# Patient Record
Sex: MALE | Race: WHITE | Employment: FULL TIME | URBAN - METROPOLITAN AREA
[De-identification: names, ages, dates, MRNs, and addresses within clinical notes are randomized per-mention and may not be internally consistent; named-entity substitution may affect disease eponyms.]

---

## 2017-01-20 ENCOUNTER — OFFICE VISIT (OUTPATIENT)
Dept: FAMILY MEDICINE CLINIC | Age: 52
End: 2017-01-20

## 2017-01-20 VITALS
SYSTOLIC BLOOD PRESSURE: 136 MMHG | WEIGHT: 179.8 LBS | HEIGHT: 72 IN | TEMPERATURE: 96.8 F | DIASTOLIC BLOOD PRESSURE: 88 MMHG | RESPIRATION RATE: 12 BRPM | HEART RATE: 52 BPM | BODY MASS INDEX: 24.35 KG/M2

## 2017-01-20 DIAGNOSIS — S29.011A CHEST WALL MUSCLE STRAIN, INITIAL ENCOUNTER: Primary | ICD-10-CM

## 2017-01-20 DIAGNOSIS — K40.90 INGUINAL HERNIA, RIGHT: ICD-10-CM

## 2017-01-20 PROCEDURE — G8420 CALC BMI NORM PARAMETERS: HCPCS | Performed by: FAMILY MEDICINE

## 2017-01-20 PROCEDURE — G8427 DOCREV CUR MEDS BY ELIG CLIN: HCPCS | Performed by: FAMILY MEDICINE

## 2017-01-20 PROCEDURE — G8484 FLU IMMUNIZE NO ADMIN: HCPCS | Performed by: FAMILY MEDICINE

## 2017-01-20 PROCEDURE — 99213 OFFICE O/P EST LOW 20 MIN: CPT | Performed by: FAMILY MEDICINE

## 2017-01-20 PROCEDURE — 1036F TOBACCO NON-USER: CPT | Performed by: FAMILY MEDICINE

## 2017-01-20 PROCEDURE — 3017F COLORECTAL CA SCREEN DOC REV: CPT | Performed by: FAMILY MEDICINE

## 2017-01-20 RX ORDER — DICLOFENAC SODIUM 75 MG/1
75 TABLET, DELAYED RELEASE ORAL 2 TIMES DAILY
Qty: 60 TABLET | Refills: 1 | Status: SHIPPED | OUTPATIENT
Start: 2017-01-20 | End: 2017-02-06 | Stop reason: ALTCHOICE

## 2017-01-20 ASSESSMENT — ENCOUNTER SYMPTOMS
DIARRHEA: 0
COUGH: 0
SHORTNESS OF BREATH: 0
ABDOMINAL PAIN: 0
NAUSEA: 0
EYES NEGATIVE: 1
CONSTIPATION: 0

## 2017-01-20 ASSESSMENT — PATIENT HEALTH QUESTIONNAIRE - PHQ9
SUM OF ALL RESPONSES TO PHQ9 QUESTIONS 1 & 2: 0
2. FEELING DOWN, DEPRESSED OR HOPELESS: 0
SUM OF ALL RESPONSES TO PHQ QUESTIONS 1-9: 0
1. LITTLE INTEREST OR PLEASURE IN DOING THINGS: 0

## 2017-02-06 ENCOUNTER — OFFICE VISIT (OUTPATIENT)
Dept: FAMILY MEDICINE CLINIC | Age: 52
End: 2017-02-06

## 2017-02-06 VITALS
HEART RATE: 68 BPM | SYSTOLIC BLOOD PRESSURE: 112 MMHG | RESPIRATION RATE: 16 BRPM | HEIGHT: 72 IN | BODY MASS INDEX: 27.39 KG/M2 | DIASTOLIC BLOOD PRESSURE: 66 MMHG | TEMPERATURE: 97.1 F | WEIGHT: 202.2 LBS

## 2017-02-06 DIAGNOSIS — Z12.5 SPECIAL SCREENING FOR MALIGNANT NEOPLASM OF PROSTATE: ICD-10-CM

## 2017-02-06 DIAGNOSIS — Z00.00 ROUTINE GENERAL MEDICAL EXAMINATION AT A HEALTH CARE FACILITY: ICD-10-CM

## 2017-02-06 DIAGNOSIS — K40.90 INGUINAL HERNIA, RIGHT: ICD-10-CM

## 2017-02-06 DIAGNOSIS — Z01.818 PRE-OP EXAMINATION: ICD-10-CM

## 2017-02-06 DIAGNOSIS — Z01.818 PRE-OP EXAMINATION: Primary | ICD-10-CM

## 2017-02-06 DIAGNOSIS — I48.0 PAROXYSMAL A-FIB (HCC): ICD-10-CM

## 2017-02-06 LAB
ALBUMIN SERPL-MCNC: 4.6 G/DL (ref 3.9–4.9)
ALP BLD-CCNC: 59 U/L (ref 35–104)
ALT SERPL-CCNC: 20 U/L (ref 0–41)
ANION GAP SERPL CALCULATED.3IONS-SCNC: 12 MEQ/L (ref 7–13)
APTT: 27.2 SEC (ref 21.6–35.4)
AST SERPL-CCNC: 17 U/L (ref 0–40)
BASOPHILS ABSOLUTE: 0.1 K/UL (ref 0–0.2)
BASOPHILS RELATIVE PERCENT: 0.9 %
BILIRUB SERPL-MCNC: 0.8 MG/DL (ref 0–1.2)
BUN BLDV-MCNC: 15 MG/DL (ref 6–20)
CALCIUM SERPL-MCNC: 9.5 MG/DL (ref 8.6–10.2)
CHLORIDE BLD-SCNC: 105 MEQ/L (ref 98–107)
CHOLESTEROL, TOTAL: 198 MG/DL (ref 0–199)
CO2: 24 MEQ/L (ref 22–29)
CREAT SERPL-MCNC: 0.81 MG/DL (ref 0.7–1.2)
EOSINOPHILS ABSOLUTE: 0.2 K/UL (ref 0–0.7)
EOSINOPHILS RELATIVE PERCENT: 2.7 %
GFR AFRICAN AMERICAN: >60
GFR NON-AFRICAN AMERICAN: >60
GLOBULIN: 3 G/DL (ref 2.3–3.5)
GLUCOSE BLD-MCNC: 99 MG/DL (ref 74–109)
HCT VFR BLD CALC: 41.3 % (ref 42–52)
HDLC SERPL-MCNC: 46 MG/DL (ref 40–59)
HEMOGLOBIN: 13.8 G/DL (ref 14–18)
INR BLD: 0.9
LDL CHOLESTEROL CALCULATED: 97 MG/DL (ref 0–129)
LYMPHOCYTES ABSOLUTE: 2 K/UL (ref 1–4.8)
LYMPHOCYTES RELATIVE PERCENT: 34.7 %
MCH RBC QN AUTO: 30.6 PG (ref 27–31.3)
MCHC RBC AUTO-ENTMCNC: 33.4 % (ref 33–37)
MCV RBC AUTO: 91.6 FL (ref 80–100)
MONOCYTES ABSOLUTE: 0.5 K/UL (ref 0.2–0.8)
MONOCYTES RELATIVE PERCENT: 9.5 %
NEUTROPHILS ABSOLUTE: 3 K/UL (ref 1.4–6.5)
NEUTROPHILS RELATIVE PERCENT: 52.2 %
PDW BLD-RTO: 13.4 % (ref 11.5–14.5)
PLATELET # BLD: 255 K/UL (ref 130–400)
POTASSIUM SERPL-SCNC: 4.2 MEQ/L (ref 3.5–5.1)
PROSTATE SPECIFIC ANTIGEN: 1.55 NG/ML (ref 0–3.89)
PROTHROMBIN TIME: 10 SEC (ref 8.1–13.7)
RBC # BLD: 4.5 M/UL (ref 4.7–6.1)
SODIUM BLD-SCNC: 141 MEQ/L (ref 132–144)
TOTAL PROTEIN: 7.6 G/DL (ref 6.4–8.1)
TRIGL SERPL-MCNC: 276 MG/DL (ref 0–200)
WBC # BLD: 5.7 K/UL (ref 4.8–10.8)

## 2017-02-06 PROCEDURE — G8427 DOCREV CUR MEDS BY ELIG CLIN: HCPCS | Performed by: FAMILY MEDICINE

## 2017-02-06 PROCEDURE — G8484 FLU IMMUNIZE NO ADMIN: HCPCS | Performed by: FAMILY MEDICINE

## 2017-02-06 PROCEDURE — 99214 OFFICE O/P EST MOD 30 MIN: CPT | Performed by: FAMILY MEDICINE

## 2017-02-06 PROCEDURE — 93000 ELECTROCARDIOGRAM COMPLETE: CPT | Performed by: FAMILY MEDICINE

## 2017-02-06 PROCEDURE — 1036F TOBACCO NON-USER: CPT | Performed by: FAMILY MEDICINE

## 2017-02-06 PROCEDURE — 3017F COLORECTAL CA SCREEN DOC REV: CPT | Performed by: FAMILY MEDICINE

## 2017-02-06 PROCEDURE — G8419 CALC BMI OUT NRM PARAM NOF/U: HCPCS | Performed by: FAMILY MEDICINE

## 2017-02-06 ASSESSMENT — ENCOUNTER SYMPTOMS
DIARRHEA: 0
CONSTIPATION: 0
COUGH: 0
NAUSEA: 0
ABDOMINAL PAIN: 0
EYES NEGATIVE: 1
SHORTNESS OF BREATH: 0

## 2017-02-07 ENCOUNTER — OFFICE VISIT (OUTPATIENT)
Dept: CARDIOLOGY | Age: 52
End: 2017-02-07

## 2017-02-07 VITALS
SYSTOLIC BLOOD PRESSURE: 130 MMHG | HEART RATE: 61 BPM | DIASTOLIC BLOOD PRESSURE: 80 MMHG | OXYGEN SATURATION: 99 % | HEIGHT: 72 IN | BODY MASS INDEX: 26.82 KG/M2 | WEIGHT: 198 LBS

## 2017-02-07 DIAGNOSIS — I48.19 PERSISTENT ATRIAL FIBRILLATION (HCC): Primary | ICD-10-CM

## 2017-02-07 PROCEDURE — G8484 FLU IMMUNIZE NO ADMIN: HCPCS | Performed by: INTERNAL MEDICINE

## 2017-02-07 PROCEDURE — G8419 CALC BMI OUT NRM PARAM NOF/U: HCPCS | Performed by: INTERNAL MEDICINE

## 2017-02-07 PROCEDURE — 99213 OFFICE O/P EST LOW 20 MIN: CPT | Performed by: INTERNAL MEDICINE

## 2017-02-07 PROCEDURE — G8427 DOCREV CUR MEDS BY ELIG CLIN: HCPCS | Performed by: INTERNAL MEDICINE

## 2017-02-07 PROCEDURE — 1036F TOBACCO NON-USER: CPT | Performed by: INTERNAL MEDICINE

## 2017-02-07 PROCEDURE — 3017F COLORECTAL CA SCREEN DOC REV: CPT | Performed by: INTERNAL MEDICINE

## 2017-02-07 ASSESSMENT — ENCOUNTER SYMPTOMS: SORE THROAT: 0

## 2017-08-08 ENCOUNTER — OFFICE VISIT (OUTPATIENT)
Dept: CARDIOLOGY | Age: 52
End: 2017-08-08

## 2017-08-08 VITALS
SYSTOLIC BLOOD PRESSURE: 120 MMHG | WEIGHT: 196 LBS | DIASTOLIC BLOOD PRESSURE: 80 MMHG | BODY MASS INDEX: 26.55 KG/M2 | HEIGHT: 72 IN | HEART RATE: 107 BPM

## 2017-08-08 DIAGNOSIS — I48.19 PERSISTENT ATRIAL FIBRILLATION (HCC): Primary | ICD-10-CM

## 2017-08-08 PROCEDURE — 99213 OFFICE O/P EST LOW 20 MIN: CPT | Performed by: INTERNAL MEDICINE

## 2017-08-08 PROCEDURE — 1036F TOBACCO NON-USER: CPT | Performed by: INTERNAL MEDICINE

## 2017-08-08 PROCEDURE — G8427 DOCREV CUR MEDS BY ELIG CLIN: HCPCS | Performed by: INTERNAL MEDICINE

## 2017-08-08 PROCEDURE — G8419 CALC BMI OUT NRM PARAM NOF/U: HCPCS | Performed by: INTERNAL MEDICINE

## 2017-08-08 PROCEDURE — 93000 ELECTROCARDIOGRAM COMPLETE: CPT | Performed by: INTERNAL MEDICINE

## 2017-08-08 PROCEDURE — 3017F COLORECTAL CA SCREEN DOC REV: CPT | Performed by: INTERNAL MEDICINE

## 2017-09-22 ENCOUNTER — HOSPITAL ENCOUNTER (OUTPATIENT)
Dept: CARDIAC CATH/INVASIVE PROCEDURES | Age: 52
Discharge: HOME | End: 2017-09-22
Attending: INTERNAL MEDICINE | Admitting: INTERNAL MEDICINE
Payer: COMMERCIAL

## 2017-09-22 VITALS — BODY MASS INDEX: 27.09 KG/M2 | RESPIRATION RATE: 18 BRPM | WEIGHT: 200 LBS | HEIGHT: 72 IN

## 2017-09-22 LAB
EKG ATRIAL RATE: 220 BPM
EKG Q-T INTERVAL: 404 MS
EKG QRS DURATION: 84 MS
EKG QTC CALCULATION (BAZETT): 445 MS
EKG R AXIS: -17 DEGREES
EKG T AXIS: -6 DEGREES
EKG VENTRICULAR RATE: 73 BPM

## 2017-09-22 PROCEDURE — 93005 ELECTROCARDIOGRAM TRACING: CPT

## 2017-09-22 RX ORDER — SODIUM CHLORIDE 9 MG/ML
INJECTION, SOLUTION INTRAVENOUS CONTINUOUS
Status: DISCONTINUED | OUTPATIENT
Start: 2017-09-22 | End: 2017-09-22

## 2017-10-05 ENCOUNTER — OFFICE VISIT (OUTPATIENT)
Dept: CARDIOLOGY | Age: 52
End: 2017-10-05

## 2017-10-05 VITALS
HEART RATE: 63 BPM | WEIGHT: 196 LBS | HEIGHT: 72 IN | SYSTOLIC BLOOD PRESSURE: 110 MMHG | DIASTOLIC BLOOD PRESSURE: 80 MMHG | BODY MASS INDEX: 26.55 KG/M2

## 2017-10-05 DIAGNOSIS — I48.19 PERSISTENT ATRIAL FIBRILLATION (HCC): Primary | ICD-10-CM

## 2017-10-05 PROCEDURE — G8417 CALC BMI ABV UP PARAM F/U: HCPCS | Performed by: INTERNAL MEDICINE

## 2017-10-05 PROCEDURE — 3017F COLORECTAL CA SCREEN DOC REV: CPT | Performed by: INTERNAL MEDICINE

## 2017-10-05 PROCEDURE — G8427 DOCREV CUR MEDS BY ELIG CLIN: HCPCS | Performed by: INTERNAL MEDICINE

## 2017-10-05 PROCEDURE — 1036F TOBACCO NON-USER: CPT | Performed by: INTERNAL MEDICINE

## 2017-10-05 PROCEDURE — G8484 FLU IMMUNIZE NO ADMIN: HCPCS | Performed by: INTERNAL MEDICINE

## 2017-10-05 PROCEDURE — 93000 ELECTROCARDIOGRAM COMPLETE: CPT | Performed by: INTERNAL MEDICINE

## 2017-10-05 PROCEDURE — 99213 OFFICE O/P EST LOW 20 MIN: CPT | Performed by: INTERNAL MEDICINE

## 2017-10-05 NOTE — PROGRESS NOTES
Chief Complaint   Patient presents with    Atrial Fibrillation     F/U TO DISCUSS HAVING TO CANCEL THE CARDIOVERSION DUE TO MEDICATION ISSUES.       4-7-16: Patient presents for initial medical evaluation. Patient is followed on a regular basis by Dr. Sajan Padron MD. Had been feeling SOB and IRR HB at times. Does have chest tightness and indigestion at times. Was noted to have new onset Afibb on routine EKG at PCP;s office. Does eat candy on a regular basis. He does snore at night per wife. Pt denies  nausea, vomiting, diarrhea, constipation, motor weakness, insomnia, weight loss, syncope, dizziness, lightheadedness, palpitations, PND, orthopnea. No hx of MI, CHF or LHC/Stress test. Does have family hx of heart disease. S/p ECHO with EF of 40%, difficult to estimate due to afibb, mild MR, trace AI. Lab work is ok. 5-19-16: s/p normal nuclear stress test with EF of 46%, likely due to poor gating due to afibb. No ischemia. S/p sleep study with mild PARVIN, mild O2 desaturation to 85% for 4 min. CPAP therapy was indicated if symptomatic. Pt denies chest pain,  change in exercise capacity, fatigue,  nausea, vomiting, diarrhea, constipation, motor weakness, insomnia, weight loss, syncope, dizziness, lightheadedness, palpitations, PND, orthopnea, or claudication. Does have SOB/Lh/dizz and fatigue with bending down and moving things around. 6-16-16: s/p RUSS guided CV with successful conversion to NSR. Was loaded with amiodarone. RUSS was ok, no shunts with bubble study. EKG today in office with HR of 47bpm, QTC of 436ms. Feeling better overall. Has more energy. No bleeding issues. Pt denies chest pain, dyspnea, dyspnea on exertion, change in exercise capacity, fatigue,  nausea, vomiting, diarrhea, constipation, motor weakness, insomnia, weight loss, syncope, dizziness, lightheadedness, palpitations, PND, orthopnea, or claudication. 9-8-16: as above, feeling good overall.  Pt denies chest pain, dyspnea, dyspnea on exertion, change in exercise capacity, fatigue,  nausea, vomiting, diarrhea, constipation, motor weakness, insomnia, weight loss, syncope, dizziness, lightheadedness, palpitations, PND, orthopnea, or claudication. States he's active, playing softball and not having any issues. 2-7-17: does have a couple of procedures coming up with hernia and leg vein stripping at [facility]. S/p 2 week event monitor with NSR, no evidence of atrial fibb. He did stop his meds of amio and coumadin in 1/2017. Pt denies chest pain, dyspnea, dyspnea on exertion, change in exercise capacity, fatigue,  nausea, vomiting, diarrhea, constipation, motor weakness, insomnia, weight loss, syncope, dizziness, lightheadedness, palpitations, PND, orthopnea.     8-8-17: feeling good overall. Pt denies chest pain, dyspnea, dyspnea on exertion, change in exercise capacity, fatigue,  nausea, vomiting, diarrhea, constipation, motor weakness, insomnia, weight loss, syncope, dizziness, lightheadedness, palpitations, PND, orthopnea, or claudication. No nitro use. BP and hr are good. CAD is stable. No LE discoloration or ulcers. No LE edema. No CHF type symptoms. Lipid profile is normal. CHADS2=0. Not on any [address]. EKG today shows Afibb at 107bpm.       10-5-17: was not taking his meds and therefore CV was cancelled. He is back on his meds now for 2 weeks. Pt denies chest pain, dyspnea, dyspnea on exertion, change in exercise capacity, fatigue,  nausea, vomiting, diarrhea, constipation, motor weakness, insomnia, weight loss, syncope, dizziness, lightheadedness, palpitations, PND, orthopnea, or claudication. No bleeding issues. No excessive caffeine/ETOH. BP and HR are good. afibb is stable.      Patient Active Problem List   Diagnosis    Persistent atrial fibrillation ([address]    Family history of coronary artery disease    Marijuana abuse       Past Surgical History:   Procedure Laterality Date    CARDIOVERSION  05/23/16    COLONOSCOPY  10/30/2014 kg/(m^2). Physical Examination:  General appearance - alert, well appearing, and in no distress  Mental status - alert, oriented to person, place, and time  Neck - Neck is supple, no JVD or carotid bruits. No thyromegaly or adenopathy. Chest - clear to auscultation, no wheezes, rales or rhonchi, symmetric air entry  Heart - normal rate, regular rhythm, normal S1, S2, no murmurs, rubs, clicks or gallops  Abdomen - soft, nontender, nondistended, no masses or organomegaly  Neurological - alert, oriented, normal speech, no focal findings or movement disorder noted  Extremities - peripheral pulses normal, no pedal edema, no clubbing or cyanosis  Skin - normal coloration and turgor, no rashes, no suspicious skin lesions noted      Orders Placed This Encounter   Procedures    EKG 12 Lead     EKG with Afibb at 63bpm.     ASSESSMENT:    1. Persistent atrial fibrillation (HCC) - CHADS2=0    2. Family history of coronary artery disease     3. Marijuana abuse     4. Precordial pain     5. LEE (dyspnea on exertion)           PLAN:     Patient will need to continue to follow up with you for their general medical care     As always, aggressive risk factor modification is strongly recommended. We should adhere to the [address] guidelines for HTN management and the NCEP ATP III guidelines for LDL-C management. Cardiac diet is always recommended with low fat, cholesterol, calories and sodium. Continue medications at current doses. Check EKG today    Consider Afibb ablation in future. Will plan for CV and sotalol loading in September per patient request.     Resume Xarelto and start lopressor 25mg BID. Patient is to avoid any excessive caffeine, chocolate, or OTC stimulants. Thank you for allowing me to participate in the care of your patient, please don't hesitate to contact me if you have any further questions.

## 2017-10-05 NOTE — MR AVS SNAPSHOT
After Visit Summary             Bridger Yates   10/5/2017 8:00 AM   Office Visit    Description:  Male : 1965   Provider:  Sai Pascal DO   Department:  [facility]              Your Follow-Up and Future Appointments         Below is a list of your follow-up and future appointments. This may not be a complete list as you may have made appointments directly with providers that we are not aware of or your providers may have made some for you. Please call your providers to confirm appointments. It is important to keep your appointments. Please bring your current insurance card, photo ID, co-pay, and all medication bottles to your appointment. If self-pay, payment is expected at the time of service. Information from Your Visit        Department     Name Address Phone Fax    [facility] [address] [place] [address] Mary Meade 69 [phone]      You Were Seen for:         Comments    Persistent atrial fibrillation [facility])   [[id number]]         Vital Signs     Blood Pressure Pulse Height Weight Body Mass Index Smoking Status    110/80 (Site: Right Arm, Position: Sitting, Cuff Size: Large Adult) 61 6' (1.829 m) 196 lb (88.9 kg) 26.58 kg/m2 Former Smoker      Additional Information about your Body Mass Index (BMI)           Your BMI as listed above is considered overweight (25.0-29.9). BMI is an estimate of body fat, calculated from your height and weight. The higher your BMI, the greater your risk of heart disease, high blood pressure, type 2 diabetes, stroke, gallstones, arthritis, sleep apnea, and certain cancers. BMI is not perfect. It may overestimate body fat in athletes and people who are more muscular. If your body fat is high you can improve your BMI by decreasing your calorie intake and becoming more physically active.      Learn more at: [email or web address]             Medications and Orders Your Current Medications Are              rivaroxaban (XARELTO) 20 MG TABS tablet Take 1 tablet by mouth daily (with breakfast)    metoprolol tartrate (LOPRESSOR) 25 MG tablet Take 1 tablet by mouth 2 times daily      Allergies           No Known Allergies      We Ordered/Performed the following           EKG 12 Lead          Result Summary for EKG 12 Lead      Result Information     Status          Final result (Resulted: 10/5/2017)           10/5/2017  8:24 AM      Scans on Order [id number]            ECG on 10/5/2017  8:24 AM : ECG Report                     Additional Information        Basic Information     Date Of Birth Sex Race Ethnicity Preferred Language    1965 Male White Non-/Non  English      Problem List as of 10/5/2017  Date Reviewed: 8/8/2017                Persistent atrial fibrillation ([address]    Family history of coronary artery disease    Marijuana abuse      Immunizations as of 10/5/2017     Name Date    Tdap (Boostrix, Adacel) 12/13/2016      Preventive Care        Date Due    Colonoscopy 10/29/2017    Yearly Flu Vaccine (1) 1/20/2018 (Originally 9/1/2017)    Hepatitis C screening is recommended for all adults regardless of risk factors born between [facility] at least once (lifetime) who have never been tested. 1/20/2018 (Originally 1965)    HIV screening is recommended for all people regardless of risk factors  aged 15-65 years at least once (lifetime) who have never been HIV tested. 1/20/2018 (Originally 8/4/1978)    Cholesterol Screening 2/6/2022    Tetanus Combination Vaccine (2 - Td) 12/13/2026            MyChart Signup           Our records indicate that you have declined MyChart signup.

## 2017-10-09 ENCOUNTER — OFFICE VISIT (OUTPATIENT)
Dept: FAMILY MEDICINE CLINIC | Age: 52
End: 2017-10-09

## 2017-10-09 VITALS
WEIGHT: 192 LBS | HEIGHT: 72 IN | SYSTOLIC BLOOD PRESSURE: 108 MMHG | HEART RATE: 93 BPM | BODY MASS INDEX: 26.01 KG/M2 | DIASTOLIC BLOOD PRESSURE: 62 MMHG | RESPIRATION RATE: 20 BRPM | TEMPERATURE: 97.2 F

## 2017-10-09 DIAGNOSIS — J01.90 ACUTE BACTERIAL SINUSITIS: ICD-10-CM

## 2017-10-09 DIAGNOSIS — R05.9 COUGH: Primary | ICD-10-CM

## 2017-10-09 DIAGNOSIS — B96.89 ACUTE BACTERIAL SINUSITIS: ICD-10-CM

## 2017-10-09 DIAGNOSIS — Z12.11 COLON CANCER SCREENING: ICD-10-CM

## 2017-10-09 PROCEDURE — G8484 FLU IMMUNIZE NO ADMIN: HCPCS | Performed by: FAMILY MEDICINE

## 2017-10-09 PROCEDURE — 3017F COLORECTAL CA SCREEN DOC REV: CPT | Performed by: FAMILY MEDICINE

## 2017-10-09 PROCEDURE — 1036F TOBACCO NON-USER: CPT | Performed by: FAMILY MEDICINE

## 2017-10-09 PROCEDURE — 99213 OFFICE O/P EST LOW 20 MIN: CPT | Performed by: FAMILY MEDICINE

## 2017-10-09 PROCEDURE — G8427 DOCREV CUR MEDS BY ELIG CLIN: HCPCS | Performed by: FAMILY MEDICINE

## 2017-10-09 PROCEDURE — G8417 CALC BMI ABV UP PARAM F/U: HCPCS | Performed by: FAMILY MEDICINE

## 2017-10-09 RX ORDER — METHYLPREDNISOLONE 4 MG/1
TABLET ORAL
Qty: 1 KIT | Refills: 0 | Status: ON HOLD | OUTPATIENT
Start: 2017-10-09 | End: 2017-11-03 | Stop reason: ALTCHOICE

## 2017-10-09 RX ORDER — AZITHROMYCIN 250 MG/1
TABLET, FILM COATED ORAL
Qty: 1 PACKET | Refills: 0 | Status: SHIPPED | OUTPATIENT
Start: 2017-10-09 | End: 2017-10-19

## 2017-10-09 ASSESSMENT — ENCOUNTER SYMPTOMS
COUGH: 1
EYE ITCHING: 0
CONSTIPATION: 0
ABDOMINAL PAIN: 0
SORE THROAT: 0
SINUS PRESSURE: 0
SHORTNESS OF BREATH: 0
DIARRHEA: 0
EYE DISCHARGE: 0

## 2017-10-09 NOTE — PROGRESS NOTES
Subjective  Daquan Tate, 45 y.o. male presents today with:  Chief Complaint   Patient presents with    Cough     productive clear cough, ST, Congestion x 6 wks. Unchanged since the onset. taking Mucinex- minimal relief           HPI    Patient with complaints of sinus pressure congestion and chronic cough occasionally productive. No other questions and or concerns for today's visit      Review of Systems   Constitutional: Negative for appetite change, fatigue and fever. HENT: Positive for congestion. Negative for ear pain, sinus pressure and sore throat. Eyes: Negative for discharge and itching. Respiratory: Positive for cough. Negative for shortness of breath. Cardiovascular: Negative for chest pain and palpitations. Gastrointestinal: Negative for abdominal pain, constipation and diarrhea. Endocrine: Negative for polydipsia. Genitourinary: Negative for difficulty urinating. Musculoskeletal: Negative for gait problem. Skin: Negative. Neurological: Negative for dizziness. Hematological: Negative. Psychiatric/Behavioral: Negative.           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    DR Daniela Gibbons    Persistent atrial fibrillation ([facility] [address] [address] 4/7/2016    Precordial pain 4/7/2016     Past Surgical History:   Procedure Laterality Date    CARDIOVERSION  05/23/16    COLONOSCOPY  10/30/2014    DR Daniela Gibbons - POLYP, SUBMUCOSAL MASS    HERNIA REPAIR  02/16/2017    DR Linnea Sotelo     Social History     Social History    Marital status:      Spouse name: Denise Courser     Number of children: 3    Years of education: N/A     Occupational History     [employer]     Social History Main Topics    Smoking status: Former Smoker Types: Cigars    Smokeless tobacco: Never Used    Alcohol use 0.0 oz/week    Drug use: No    Sexual activity: Not on file     Other Topics Concern    Not on file     Social History Narrative    No narrative on file     Family History   Problem Relation Age of Onset    Kidney Disease Father     Substance Abuse Father     Heart Disease Father     Diabetes Maternal Grandmother     Diabetes Maternal Grandfather     Diabetes Paternal Grandmother     Diabetes Paternal Grandfather      No Known Allergies  Current Outpatient Prescriptions   Medication Sig Dispense Refill    rivaroxaban (XARELTO) 20 MG TABS tablet Take 1 tablet by mouth daily (with breakfast) 30 tablet 6    metoprolol tartrate (LOPRESSOR) 25 MG tablet Take 1 tablet by mouth 2 times daily 60 tablet 6     No current facility-administered medications for this visit. PMH, Surgical Hx, Family Hx, and Social Hx reviewed and updated. Health Maintenance reviewed. Objective    Vitals:    10/09/17 1441   BP: 108/62   Pulse: 93   Resp: 20   Temp: 97.2 °F (36.2 °C)   TempSrc: Temporal   Weight: 192 lb (87.1 kg)   Height: 6' (1.829 m)       Physical Exam   Constitutional: He is oriented to person, place, and time. He appears well-developed and well-nourished. HENT:   Head: Normocephalic and atraumatic. Right Ear: External ear normal.   Left Ear: External ear normal.   Mouth/Throat: Oropharynx is clear and moist.   Eyes: Conjunctivae and EOM are normal. Pupils are equal, round, and reactive to light. Neck: Normal range of motion. Neck supple. No JVD present. No thyromegaly present. Cardiovascular: Normal rate, regular rhythm, normal heart sounds and intact distal pulses. Exam reveals no gallop and no friction rub. No murmur heard. Pulmonary/Chest: Effort normal and breath sounds normal. No respiratory distress. Abdominal: Bowel sounds are normal. He exhibits no mass. There is no tenderness. Musculoskeletal: Normal range of motion. Neurological: He is alert and oriented to person, place, and time. Skin: Skin is warm and dry. Psychiatric: He has a normal mood and affect. His behavior is normal.     He has  Sinus discomfort thanks inflamed adenopathy or exudate no drainage lungs are clear assessment sinusitis pharyngitis plan Z-Keena steroid pack and symptom for cough and follow-up not improved. Assessment & Plan   1. Cough     2. Colon cancer screening  Ambulatory referral to Gastroenterology   3. Acute bacterial sinusitis       Orders Placed This Encounter   Procedures    Ambulatory referral to Gastroenterology     Referral Priority:   Routine     Referral Type:   Consult for Advice and Opinion     Referral Reason:   Specialty Services Required     Requested Specialty:   Gastroenterology     Number of Visits Requested:   1     Orders Placed This Encounter   Medications    azithromycin (ZITHROMAX Z-KEENA) 250 MG tablet     Sig: Take 2 pills together on the first day. Take 1 pill a day for the remaining 4 days. Dispense:  1 packet     Refill:  0    methylPREDNISolone (MEDROL DOSEPACK) 4 MG tablet     Sig: Take by mouth. Dispense:  1 kit     Refill:  0    HYDROcodone-homatropine (HYCODAN) 5-1.5 MG/5ML syrup     Si - 2 TSP Q 6 HRS PRN. Dispense:  240 mL     Refill:  0     There are no discontinued medications. No Follow-up on file.         Controlled Substances Monitoring:          Daria Thapa MD            Increase fluids take medications as ordered and follow up if not feeling better

## 2017-10-30 ENCOUNTER — ANESTHESIA EVENT (OUTPATIENT)
Dept: ENDOSCOPY | Age: 52
End: 2017-10-30
Payer: COMMERCIAL

## 2017-10-30 ENCOUNTER — ANESTHESIA (OUTPATIENT)
Dept: ENDOSCOPY | Age: 52
End: 2017-10-30
Payer: COMMERCIAL

## 2017-10-30 ENCOUNTER — HOSPITAL ENCOUNTER (OUTPATIENT)
Age: 52
Setting detail: OUTPATIENT SURGERY
Discharge: HOME | End: 2017-10-30
Attending: SPECIALIST | Admitting: SPECIALIST
Payer: COMMERCIAL

## 2017-10-30 VITALS
SYSTOLIC BLOOD PRESSURE: 107 MMHG | HEART RATE: 69 BPM | RESPIRATION RATE: 16 BRPM | TEMPERATURE: 97.5 F | DIASTOLIC BLOOD PRESSURE: 71 MMHG | OXYGEN SATURATION: 97 %

## 2017-10-30 VITALS
SYSTOLIC BLOOD PRESSURE: 99 MMHG | DIASTOLIC BLOOD PRESSURE: 57 MMHG | RESPIRATION RATE: 17 BRPM | OXYGEN SATURATION: 98 %

## 2017-10-30 PROCEDURE — 2580000003 HC RX 258: Performed by: SPECIALIST

## 2017-10-30 PROCEDURE — 6360000002 HC RX W HCPCS: Performed by: NURSE ANESTHETIST, CERTIFIED REGISTERED

## 2017-10-30 PROCEDURE — 3700000001 HC ADD 15 MINUTES (ANESTHESIA): Performed by: SPECIALIST

## 2017-10-30 PROCEDURE — 3609027000 HC COLONOSCOPY: Performed by: SPECIALIST

## 2017-10-30 PROCEDURE — 88305 TISSUE EXAM BY PATHOLOGIST: CPT

## 2017-10-30 PROCEDURE — 3700000000 HC ANESTHESIA ATTENDED CARE: Performed by: SPECIALIST

## 2017-10-30 PROCEDURE — 7100000010 HC PHASE II RECOVERY - FIRST 15 MIN: Performed by: SPECIALIST

## 2017-10-30 PROCEDURE — 7100000011 HC PHASE II RECOVERY - ADDTL 15 MIN: Performed by: SPECIALIST

## 2017-10-30 RX ORDER — SODIUM CHLORIDE 0.9 % (FLUSH) 0.9 %
10 SYRINGE (ML) INJECTION PRN
Status: DISCONTINUED | OUTPATIENT
Start: 2017-10-30 | End: 2017-10-30 | Stop reason: HOSPADM

## 2017-10-30 RX ORDER — SODIUM CHLORIDE 0.9 % (FLUSH) 0.9 %
10 SYRINGE (ML) INJECTION EVERY 12 HOURS SCHEDULED
Status: DISCONTINUED | OUTPATIENT
Start: 2017-10-30 | End: 2017-10-30 | Stop reason: HOSPADM

## 2017-10-30 RX ORDER — LIDOCAINE HYDROCHLORIDE 10 MG/ML
1 INJECTION, SOLUTION EPIDURAL; INFILTRATION; INTRACAUDAL; PERINEURAL
Status: DISCONTINUED | OUTPATIENT
Start: 2017-10-30 | End: 2017-10-30 | Stop reason: HOSPADM

## 2017-10-30 RX ORDER — SODIUM CHLORIDE 9 MG/ML
INJECTION, SOLUTION INTRAVENOUS CONTINUOUS
Status: DISCONTINUED | OUTPATIENT
Start: 2017-10-30 | End: 2017-10-30 | Stop reason: HOSPADM

## 2017-10-30 RX ORDER — ONDANSETRON 2 MG/ML
4 INJECTION INTRAMUSCULAR; INTRAVENOUS
Status: DISCONTINUED | OUTPATIENT
Start: 2017-10-30 | End: 2017-10-30 | Stop reason: HOSPADM

## 2017-10-30 RX ORDER — PROPOFOL 10 MG/ML
INJECTION, EMULSION INTRAVENOUS CONTINUOUS PRN
Status: DISCONTINUED | OUTPATIENT
Start: 2017-10-30 | End: 2017-10-30 | Stop reason: SDUPTHER

## 2017-10-30 RX ADMIN — SODIUM CHLORIDE: 9 INJECTION, SOLUTION INTRAVENOUS at 08:21

## 2017-10-30 RX ADMIN — PROPOFOL 100 MCG/KG/MIN: 10 INJECTION, EMULSION INTRAVENOUS at 08:31

## 2017-10-30 ASSESSMENT — ENCOUNTER SYMPTOMS: SHORTNESS OF BREATH: 1

## 2017-10-30 NOTE — ANESTHESIA PRE PROCEDURE
Department of Anesthesiology  Preprocedure Note       Name:  Shanae Miranda   Age:  45 y.o.  :  1965                                          MRN:  [id number]         Date:  10/30/2017      Surgeon: Ava Orozco):  Steve Cobos MD    Procedure: Procedure(s):  COLONOSCOPY    Medications prior to admission:   Prior to Admission medications    Medication Sig Start Date End Date Taking? Authorizing Provider   methylPREDNISolone (MEDROL DOSEPACK) 4 MG tablet Take by mouth. 10/9/17  Yes Norma Kyle MD   HYDROcodone-homatropine (HYCODAN) 5-1.5 MG/5ML syrup 1 - 2 TSP Q 6 HRS PRN.  10/9/17  Yes Norma Kyle MD   rivaroxaban (XARELTO) 20 MG TABS tablet Take 1 tablet by mouth daily (with breakfast) 17   [facility]ab[facility]ela, DO   metoprolol tartrate (LOPRESSOR) 25 MG tablet Take 1 tablet by mouth 2 times daily 17   [facility] Holgabriela, DO       Current medications:    Current Facility-Administered Medications   Medication Dose Route Frequency Provider Last Rate Last Dose    0.9 % sodium chloride infusion   Intravenous Continuous Steve Cobos MD        sodium chloride flush 0.9 % injection 10 mL  10 mL Intravenous 2 times per day Steve Cobos MD        sodium chloride flush 0.9 % injection 10 mL  10 mL Intravenous PRN Steve Cobos MD        lidocaine PF 1 % injection 1 mL  1 mL Intradermal Once PRN Steve Cobos MD           Allergies:  No Known Allergies    Problem List:    Patient Active Problem List   Diagnosis Code    Persistent atrial fibrillation (HCC) I48.1    Family history of coronary artery disease Z82.49    Marijuana abuse F12.10       Past Medical History:        Diagnosis Date    Benign neoplasm of colon 10/29/2014    DR Alyson LEE (dyspnea on exertion) 2016    Family history of coronary artery disease 2016    Marijuana abuse 2016    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    DR Arlet Landin    Persistent atrial fibrillation ([facility] [address] 2016    Precordial pain 4/7/2016       Past Surgical History:        Procedure Laterality Date    CARDIOVERSION  05/23/16    COLONOSCOPY  10/30/2014    DR Benjamin Arthur - POLYP, SUBMUCOSAL MASS    HERNIA REPAIR  02/16/2017    DR Sang Jackson       Social History:    Social History   Substance Use Topics    Smoking status: Former Smoker     Types: Cigars    Smokeless tobacco: Never Used    Alcohol use 0.0 oz/week                                Counseling given: Not Answered      Vital Signs (Current): There were no vitals filed for this visit. BP Readings from Last 3 Encounters:   10/09/17 108/62   10/05/17 110/80   08/08/17 120/80       NPO Status:                                                                                 BMI:   Wt Readings from Last 3 Encounters:   10/09/17 192 lb (87.1 kg)   10/05/17 196 lb (88.9 kg)   09/22/17 200 lb (90.7 kg)     There is no height or weight on file to calculate BMI.    CBC:   Lab Results   Component Value Date    WBC 5.7 02/06/2017    RBC 4.50 02/06/2017    HGB 13.8 02/06/2017    HCT 41.3 02/06/2017    MCV 91.6 02/06/2017    RDW 13.4 02/06/2017     02/06/2017       CMP:   Lab Results   Component Value Date     02/06/2017    K 4.2 02/06/2017     02/06/2017    CO2 24 02/06/2017    BUN 15 02/06/2017    CREATININE 0.81 02/06/2017    GFRAA >60.0 02/06/2017    LABGLOM >60.0 02/06/2017    GLUCOSE 99 02/06/2017    PROT 7.6 02/06/2017    CALCIUM 9.5 02/06/2017    BILITOT 0.8 02/06/2017    ALKPHOS 59 02/06/2017    AST 17 02/06/2017    ALT 20 02/06/2017       POC Tests: No results for input(s): POCGLU, POCNA, POCK, POCCL, POCBUN, POCHEMO, POCHCT in the last 72 hours.     Coags:   Lab Results   Component Value Date    PROTIME 10.0 02/06/2017    INR 0.9 02/06/2017    APTT 27.2 02/06/2017       HCG (If Applicable): No results found for: PREGTESTUR, PREGSERUM, HCG, HCGQUANT     ABGs: No results found for: PHART, PO2ART, VSR3SUU, XQB9DTW,

## 2017-10-30 NOTE — ANESTHESIA POSTPROCEDURE EVALUATION
Department of Anesthesiology  Postprocedure Note    Patient: Vonda Mendoza  MRN: [id number]  YOB: 1965  Date of evaluation: 10/30/2017  Time:  8:49 AM     Procedure Summary     Date:  10/30/17 Room / Location:  [address] [place] [address] 01 / 59 [facility]    Anesthesia Start:  0831 Anesthesia Stop:      Procedure:  COLONOSCOPY (N/A ) Diagnosis:  ( - Screening, hx of polyps)    Surgeon:  Tadeo Schneider MD Responsible Provider:  Tiffany Hunter CRNA    Anesthesia Type:  MAC ASA Status:  3          Anesthesia Type: MAC    May Phase I:      May Phase II:      Last vitals: Reviewed and per EMR flowsheets.        Anesthesia Post Evaluation    Patient location during evaluation: bedside  Patient participation: complete - patient participated  Level of consciousness: awake and awake and alert  Airway patency: patent  Nausea & Vomiting: no nausea and no vomiting  Complications: no  Cardiovascular status: blood pressure returned to baseline and hemodynamically stable  Respiratory status: acceptable  Hydration status: euvolemic

## 2017-11-03 ENCOUNTER — TELEPHONE (OUTPATIENT)
Dept: CARDIOLOGY | Age: 52
End: 2017-11-03

## 2017-11-03 ENCOUNTER — HOSPITAL ENCOUNTER (INPATIENT)
Age: 52
LOS: 2 days | Discharge: HOME | DRG: 310 | End: 2017-11-05
Attending: INTERNAL MEDICINE | Admitting: INTERNAL MEDICINE
Payer: COMMERCIAL

## 2017-11-03 LAB
ANION GAP SERPL CALCULATED.3IONS-SCNC: 11 MEQ/L (ref 7–13)
BUN BLDV-MCNC: 15 MG/DL (ref 6–20)
CALCIUM SERPL-MCNC: 8.8 MG/DL (ref 8.6–10.2)
CHLORIDE BLD-SCNC: 108 MEQ/L (ref 98–107)
CO2: 25 MEQ/L (ref 22–29)
CREAT SERPL-MCNC: 0.61 MG/DL (ref 0.7–1.2)
GFR AFRICAN AMERICAN: >60
GFR NON-AFRICAN AMERICAN: >60
GLUCOSE BLD-MCNC: 95 MG/DL (ref 74–109)
HCT VFR BLD CALC: 38.1 % (ref 42–52)
HEMOGLOBIN: 12.9 G/DL (ref 14–18)
LV EF: 30 %
LVEF MODALITY: NORMAL
MAGNESIUM: 2.1 MG/DL (ref 1.7–2.3)
MCH RBC QN AUTO: 30.7 PG (ref 27–31.3)
MCHC RBC AUTO-ENTMCNC: 33.8 % (ref 33–37)
MCV RBC AUTO: 91 FL (ref 80–100)
PDW BLD-RTO: 13.6 % (ref 11.5–14.5)
PLATELET # BLD: 201 K/UL (ref 130–400)
POTASSIUM SERPL-SCNC: 4 MEQ/L (ref 3.5–5.1)
RBC # BLD: 4.18 M/UL (ref 4.7–6.1)
SODIUM BLD-SCNC: 144 MEQ/L (ref 132–144)
TSH SERPL DL<=0.05 MIU/L-ACNC: 1.37 UIU/ML (ref 0.27–4.2)
WBC # BLD: 6.9 K/UL (ref 4.8–10.8)

## 2017-11-03 PROCEDURE — 6370000000 HC RX 637 (ALT 250 FOR IP): Performed by: NURSE PRACTITIONER

## 2017-11-03 PROCEDURE — 36415 COLL VENOUS BLD VENIPUNCTURE: CPT

## 2017-11-03 PROCEDURE — 93306 TTE W/DOPPLER COMPLETE: CPT

## 2017-11-03 PROCEDURE — 2060000000 HC ICU INTERMEDIATE R&B

## 2017-11-03 PROCEDURE — 93005 ELECTROCARDIOGRAM TRACING: CPT

## 2017-11-03 PROCEDURE — 84443 ASSAY THYROID STIM HORMONE: CPT

## 2017-11-03 PROCEDURE — 99233 SBSQ HOSP IP/OBS HIGH 50: CPT | Performed by: INTERNAL MEDICINE

## 2017-11-03 PROCEDURE — 83735 ASSAY OF MAGNESIUM: CPT

## 2017-11-03 PROCEDURE — 85027 COMPLETE CBC AUTOMATED: CPT

## 2017-11-03 PROCEDURE — 80048 BASIC METABOLIC PNL TOTAL CA: CPT

## 2017-11-03 RX ORDER — SOTALOL HYDROCHLORIDE 80 MG/1
80 TABLET ORAL 2 TIMES DAILY
Status: DISCONTINUED | OUTPATIENT
Start: 2017-11-03 | End: 2017-11-05 | Stop reason: HOSPADM

## 2017-11-03 RX ADMIN — SOTALOL HYDROCHLORIDE 80 MG: 80 TABLET ORAL at 20:16

## 2017-11-03 RX ADMIN — SOTALOL HYDROCHLORIDE 80 MG: 80 TABLET ORAL at 12:29

## 2017-11-03 ASSESSMENT — ENCOUNTER SYMPTOMS
CHEST TIGHTNESS: 0
GASTROINTESTINAL NEGATIVE: 1
EYES NEGATIVE: 1
ALLERGIC/IMMUNOLOGIC NEGATIVE: 1
WHEEZING: 0
CHOKING: 0
STRIDOR: 0
APNEA: 0
SHORTNESS OF BREATH: 0
COUGH: 0

## 2017-11-03 ASSESSMENT — PAIN SCALES - GENERAL
PAINLEVEL_OUTOF10: 0

## 2017-11-03 NOTE — H&P
file     Social History Narrative    No narrative on file       Family Hx:  Family History   Problem Relation Age of Onset    Kidney Disease Father     Substance Abuse Father     Heart Disease Father     Diabetes Maternal Grandmother     Diabetes Maternal Grandfather     Diabetes Paternal Grandmother     Diabetes Paternal Grandfather     Colon Cancer Paternal Uncle        Review of Systems:   Review of Systems   Constitutional: Positive for fatigue. Negative for appetite change, chills, diaphoresis and fever. HENT: Negative. Eyes: Negative. Respiratory: Negative for apnea, cough, choking, chest tightness, shortness of breath, wheezing and stridor. Cardiovascular: Positive for palpitations. Negative for chest pain and leg swelling. Gastrointestinal: Negative. Endocrine: Negative. Genitourinary: Negative. Musculoskeletal: Negative. Allergic/Immunologic: Negative. Neurological: Negative. Hematological: Negative. Psychiatric/Behavioral: Negative. Physical Examination:    There were no vitals taken for this visit. Physical Exam   Constitutional: He is oriented to person, place, and time. He appears well-developed and well-nourished. HENT:   Head: Normocephalic. Eyes: Pupils are equal, round, and reactive to light. Neck: No JVD present. No tracheal deviation present. No thyromegaly present. Cardiovascular: Intact distal pulses. An irregular rhythm present. Exam reveals no gallop and no friction rub. No murmur heard. Pulmonary/Chest: Effort normal and breath sounds normal. No respiratory distress. He has no wheezes. He has no rales. He exhibits no tenderness. Abdominal: Soft. Bowel sounds are normal.   Musculoskeletal: Normal range of motion. He exhibits no edema or tenderness. Lymphadenopathy:     He has no cervical adenopathy. Neurological: He is alert and oriented to person, place, and time. Skin: Skin is warm and dry.    Psychiatric: He has a

## 2017-11-04 LAB
ANION GAP SERPL CALCULATED.3IONS-SCNC: 11 MEQ/L (ref 7–13)
BUN BLDV-MCNC: 15 MG/DL (ref 6–20)
CALCIUM SERPL-MCNC: 8.6 MG/DL (ref 8.6–10.2)
CHLORIDE BLD-SCNC: 111 MEQ/L (ref 98–107)
CO2: 22 MEQ/L (ref 22–29)
CREAT SERPL-MCNC: 0.77 MG/DL (ref 0.7–1.2)
EKG ATRIAL RATE: 119 BPM
EKG ATRIAL RATE: 214 BPM
EKG ATRIAL RATE: 220 BPM
EKG ATRIAL RATE: 227 BPM
EKG ATRIAL RATE: 234 BPM
EKG ATRIAL RATE: 234 BPM
EKG Q-T INTERVAL: 364 MS
EKG Q-T INTERVAL: 378 MS
EKG Q-T INTERVAL: 404 MS
EKG Q-T INTERVAL: 406 MS
EKG Q-T INTERVAL: 418 MS
EKG Q-T INTERVAL: 424 MS
EKG QRS DURATION: 82 MS
EKG QRS DURATION: 82 MS
EKG QRS DURATION: 84 MS
EKG QRS DURATION: 86 MS
EKG QRS DURATION: 86 MS
EKG QRS DURATION: 92 MS
EKG QTC CALCULATION (BAZETT): 399 MS
EKG QTC CALCULATION (BAZETT): 406 MS
EKG QTC CALCULATION (BAZETT): 424 MS
EKG QTC CALCULATION (BAZETT): 432 MS
EKG QTC CALCULATION (BAZETT): 433 MS
EKG QTC CALCULATION (BAZETT): 433 MS
EKG R AXIS: -18 DEGREES
EKG R AXIS: -19 DEGREES
EKG R AXIS: -19 DEGREES
EKG R AXIS: -21 DEGREES
EKG R AXIS: -24 DEGREES
EKG R AXIS: -27 DEGREES
EKG T AXIS: -13 DEGREES
EKG T AXIS: -16 DEGREES
EKG T AXIS: -20 DEGREES
EKG T AXIS: -32 DEGREES
EKG T AXIS: -36 DEGREES
EKG T AXIS: -9 DEGREES
EKG VENTRICULAR RATE: 60 BPM
EKG VENTRICULAR RATE: 62 BPM
EKG VENTRICULAR RATE: 63 BPM
EKG VENTRICULAR RATE: 67 BPM
EKG VENTRICULAR RATE: 69 BPM
EKG VENTRICULAR RATE: 85 BPM
GFR AFRICAN AMERICAN: >60
GFR NON-AFRICAN AMERICAN: >60
GLUCOSE BLD-MCNC: 90 MG/DL (ref 74–109)
HCT VFR BLD CALC: 39.6 % (ref 42–52)
HEMOGLOBIN: 13.4 G/DL (ref 14–18)
MAGNESIUM: 2.1 MG/DL (ref 1.7–2.3)
MCH RBC QN AUTO: 30.7 PG (ref 27–31.3)
MCHC RBC AUTO-ENTMCNC: 34 % (ref 33–37)
MCV RBC AUTO: 90.4 FL (ref 80–100)
PDW BLD-RTO: 13.3 % (ref 11.5–14.5)
PLATELET # BLD: 202 K/UL (ref 130–400)
POTASSIUM SERPL-SCNC: 4.3 MEQ/L (ref 3.5–5.1)
RBC # BLD: 4.38 M/UL (ref 4.7–6.1)
SODIUM BLD-SCNC: 144 MEQ/L (ref 132–144)
WBC # BLD: 6.6 K/UL (ref 4.8–10.8)

## 2017-11-04 PROCEDURE — 93005 ELECTROCARDIOGRAM TRACING: CPT

## 2017-11-04 PROCEDURE — 36415 COLL VENOUS BLD VENIPUNCTURE: CPT

## 2017-11-04 PROCEDURE — 80048 BASIC METABOLIC PNL TOTAL CA: CPT

## 2017-11-04 PROCEDURE — 99231 SBSQ HOSP IP/OBS SF/LOW 25: CPT | Performed by: NURSE PRACTITIONER

## 2017-11-04 PROCEDURE — 2060000000 HC ICU INTERMEDIATE R&B

## 2017-11-04 PROCEDURE — 6370000000 HC RX 637 (ALT 250 FOR IP): Performed by: NURSE PRACTITIONER

## 2017-11-04 PROCEDURE — 83735 ASSAY OF MAGNESIUM: CPT

## 2017-11-04 PROCEDURE — 85027 COMPLETE CBC AUTOMATED: CPT

## 2017-11-04 RX ADMIN — SOTALOL HYDROCHLORIDE 80 MG: 80 TABLET ORAL at 09:11

## 2017-11-04 RX ADMIN — SOTALOL HYDROCHLORIDE 80 MG: 80 TABLET ORAL at 20:38

## 2017-11-04 ASSESSMENT — PAIN SCALES - GENERAL
PAINLEVEL_OUTOF10: 0

## 2017-11-04 ASSESSMENT — ENCOUNTER SYMPTOMS
COUGH: 0
WHEEZING: 0
APNEA: 0
GASTROINTESTINAL NEGATIVE: 1
ALLERGIC/IMMUNOLOGIC NEGATIVE: 1
STRIDOR: 0
EYES NEGATIVE: 1
SHORTNESS OF BREATH: 0
CHOKING: 0
CHEST TIGHTNESS: 0

## 2017-11-04 NOTE — CARE COORDINATION
PATIENT FROM HOME WITH SPOUSE AND PLANS TO RETURN HOME WITH SPOUSE WITH NO NEEDS. C3 TO FOLLOW FOR ANY CHANGES TO THE D/C PLAN.

## 2017-11-04 NOTE — PROGRESS NOTES
11/04/2017    BUN 15 11/04/2017    LABALBU 4.6 02/06/2017    CREATININE 0.77 11/04/2017    CALCIUM 8.6 11/04/2017    GFRAA >60.0 11/04/2017    LABGLOM >60.0 11/04/2017    GLUCOSE 90 11/04/2017     Magnesium:    Lab Results   Component Value Date    MG 2.1 11/04/2017     Troponin:  No results found for: TROPONINI    EKG:  A-fib qtc 423    Assessment:    Active Hospital Problems    Diagnosis Date Noted    Persistent atrial fibrillation ([address] [I48.1] 04/07/2016     Priority: Low      echo   Conclusions   Summary   Normal mitral valve structure and function.   Mild (1+) mitral regurgitation is present.   Normal aortic valve structure and function.   Normal tricuspid valve structure and function.   Normal pulmonic valve structure and function.   Moderately dilated left atrium.   Left ventricular ejection fraction is visually estimated at 30%.   Left ventricular size is mildly increased.   Normal right atrium.   Normal right ventricle structure and function.   No evidence of pericardial effusion.   No evidence of pleural effusion.   Signature     Plan:  1. Tele monitoring  2. Daily ekg's  3. Continue sotalol  4. 4/18/16 stress test normal  5.  Recheck echo after back in sinus  Electronically signed by Brisa Martínez NP on 11/4/2017 at 8:49 AM

## 2017-11-05 VITALS
WEIGHT: 194 LBS | TEMPERATURE: 97.9 F | DIASTOLIC BLOOD PRESSURE: 82 MMHG | SYSTOLIC BLOOD PRESSURE: 121 MMHG | BODY MASS INDEX: 26.31 KG/M2 | HEART RATE: 73 BPM | OXYGEN SATURATION: 98 % | RESPIRATION RATE: 18 BRPM

## 2017-11-05 LAB
ANION GAP SERPL CALCULATED.3IONS-SCNC: 14 MEQ/L (ref 7–13)
BUN BLDV-MCNC: 13 MG/DL (ref 6–20)
CALCIUM SERPL-MCNC: 8.9 MG/DL (ref 8.6–10.2)
CHLORIDE BLD-SCNC: 106 MEQ/L (ref 98–107)
CO2: 22 MEQ/L (ref 22–29)
CREAT SERPL-MCNC: 0.76 MG/DL (ref 0.7–1.2)
GFR AFRICAN AMERICAN: >60
GFR NON-AFRICAN AMERICAN: >60
GLUCOSE BLD-MCNC: 94 MG/DL (ref 74–109)
HCT VFR BLD CALC: 42.5 % (ref 42–52)
HEMOGLOBIN: 14.3 G/DL (ref 14–18)
MAGNESIUM: 2.1 MG/DL (ref 1.7–2.3)
MCH RBC QN AUTO: 30.2 PG (ref 27–31.3)
MCHC RBC AUTO-ENTMCNC: 33.7 % (ref 33–37)
MCV RBC AUTO: 89.5 FL (ref 80–100)
PDW BLD-RTO: 13.2 % (ref 11.5–14.5)
PLATELET # BLD: 222 K/UL (ref 130–400)
POTASSIUM SERPL-SCNC: 4 MEQ/L (ref 3.5–5.1)
RBC # BLD: 4.75 M/UL (ref 4.7–6.1)
SODIUM BLD-SCNC: 142 MEQ/L (ref 132–144)
WBC # BLD: 6.6 K/UL (ref 4.8–10.8)

## 2017-11-05 PROCEDURE — 83735 ASSAY OF MAGNESIUM: CPT

## 2017-11-05 PROCEDURE — 85027 COMPLETE CBC AUTOMATED: CPT

## 2017-11-05 PROCEDURE — 80048 BASIC METABOLIC PNL TOTAL CA: CPT

## 2017-11-05 PROCEDURE — 93005 ELECTROCARDIOGRAM TRACING: CPT

## 2017-11-05 PROCEDURE — 6370000000 HC RX 637 (ALT 250 FOR IP): Performed by: NURSE PRACTITIONER

## 2017-11-05 PROCEDURE — 36415 COLL VENOUS BLD VENIPUNCTURE: CPT

## 2017-11-05 PROCEDURE — 6360000002 HC RX W HCPCS: Performed by: INTERNAL MEDICINE

## 2017-11-05 PROCEDURE — 90656 IIV3 VACC NO PRSV 0.5 ML IM: CPT | Performed by: INTERNAL MEDICINE

## 2017-11-05 PROCEDURE — G0008 ADMIN INFLUENZA VIRUS VAC: HCPCS | Performed by: INTERNAL MEDICINE

## 2017-11-05 PROCEDURE — 99238 HOSP IP/OBS DSCHRG MGMT 30/<: CPT | Performed by: NURSE PRACTITIONER

## 2017-11-05 RX ORDER — SOTALOL HYDROCHLORIDE 80 MG/1
80 TABLET ORAL 2 TIMES DAILY
Qty: 60 TABLET | Refills: 5 | Status: SHIPPED | OUTPATIENT
Start: 2017-11-05

## 2017-11-05 RX ADMIN — SOTALOL HYDROCHLORIDE 80 MG: 80 TABLET ORAL at 08:06

## 2017-11-05 RX ADMIN — A/SINGAPORE/GP1908/2015 IVR-180 (AN A/MICHIGAN/45/2015 (H1N1)PDM09-LIKE VIRUS, A/HONG KONG/4801/2014, NYMC X-263B (H3N2) (AN A/HONG KONG/4801/2014-LIKE VIRUS), AND B/BRISBANE/60/2008, WILD TYPE (A B/BRISBANE/60/2008-LIKE VIRUS) 0.5 ML: 15; 15; 15 INJECTION, SUSPENSION INTRAMUSCULAR at 09:25

## 2017-11-05 ASSESSMENT — PAIN SCALES - GENERAL
PAINLEVEL_OUTOF10: 0

## 2017-11-05 NOTE — DISCHARGE SUMMARY
Cardiology Discharge Summary      Patient Identification:  Ca Christensen  : 1965  MRN: [id number]   Account: [de-identified]     Admit date: 11/3/2017  Discharge date: 17  Attending provider: Suzanne Palmer DO        Primary care provider: Arvin Michael MD     Admission Diagnoses:  Persistent atrial fibrillation [facility])         Discharge Diagnoses: Active Hospital Problems    Diagnosis Date Noted    Persistent atrial fibrillation [facility]) [I48.1] 2016     Priority: [facility] Course:   Ca Christensen is a 45 y.o. male admitted to [facility] on 11/3/2017 for A. fib and drug load. Patient was started on sotalol 80 mg by mouth twice a day QTC remained within normal limits and heart rate is controlled. We did do an echo EF of 30-40% with plan to repeat after the patient has an outpatient cardioversion      Procedures:        Consults:   none    Examination:  /82   Pulse 87   Temp 97.9 °F (36.6 °C) (Oral)   Resp 18   Wt 194 lb 0.1 oz (88 kg)   SpO2 98%   BMI 26.31 kg/m²    Physical Exam   Constitutional: He is oriented to person, place, and time. He appears well-developed and well-nourished. HENT:   Head: Normocephalic. Eyes: Pupils are equal, round, and reactive to light. Neck: No JVD present. No tracheal deviation present. No thyromegaly present. Cardiovascular: Normal heart sounds and intact distal pulses. An irregular rhythm present. Exam reveals no gallop and no friction rub. No murmur heard. Pulmonary/Chest: Effort normal and breath sounds normal. No respiratory distress. He has no wheezes. He has no rales. He exhibits no tenderness. Abdominal: Soft. Bowel sounds are normal.   Musculoskeletal: Normal range of motion. He exhibits no edema or tenderness. Lymphadenopathy:     He has no cervical adenopathy. Neurological: He is alert and oriented to person, place, and time.    Skin: Skin is 11.5 - 14.5 %    Platelets 444 153 - 395 K/uL   Magnesium    Collection Time: 11/05/17  6:10 AM   Result Value Ref Range    Magnesium 2.1 1.7 - 2.3 mg/dL           Follow-up visits:   Breanna Santiago MD  [address] [facility] [address]  [phone]          [address] [facility] [address]  [phone]    In 1 week         Assessment:  Active Hospital Problems    Diagnosis Date Noted    Persistent atrial fibrillation ([address] [I48.1] 04/07/2016     Priority: Low         Plan:   1. Okay to discharge home and we plan for an outpatient cardioversion after patient's in sinus rhythm we will repeat the echo  2. Medications as prescribed  3.   Sotalol 80 mg by mouth twice a day        Electronically signed by Maddi Paul NP on 11/5/2017 at 8:38 AM

## 2017-11-06 PROCEDURE — 93010 ELECTROCARDIOGRAM REPORT: CPT | Performed by: INTERNAL MEDICINE

## 2017-11-07 PROCEDURE — 93010 ELECTROCARDIOGRAM REPORT: CPT | Performed by: INTERNAL MEDICINE

## 2017-11-30 ENCOUNTER — OFFICE VISIT (OUTPATIENT)
Dept: CARDIOLOGY | Age: 52
End: 2017-11-30

## 2017-11-30 VITALS
WEIGHT: 194.6 LBS | DIASTOLIC BLOOD PRESSURE: 84 MMHG | HEART RATE: 66 BPM | BODY MASS INDEX: 26.39 KG/M2 | SYSTOLIC BLOOD PRESSURE: 126 MMHG

## 2017-11-30 DIAGNOSIS — I48.19 PERSISTENT ATRIAL FIBRILLATION (HCC): Primary | ICD-10-CM

## 2017-11-30 PROCEDURE — G8427 DOCREV CUR MEDS BY ELIG CLIN: HCPCS | Performed by: INTERNAL MEDICINE

## 2017-11-30 PROCEDURE — 1111F DSCHRG MED/CURRENT MED MERGE: CPT | Performed by: INTERNAL MEDICINE

## 2017-11-30 PROCEDURE — G8417 CALC BMI ABV UP PARAM F/U: HCPCS | Performed by: INTERNAL MEDICINE

## 2017-11-30 PROCEDURE — 99213 OFFICE O/P EST LOW 20 MIN: CPT | Performed by: INTERNAL MEDICINE

## 2017-11-30 PROCEDURE — G8484 FLU IMMUNIZE NO ADMIN: HCPCS | Performed by: INTERNAL MEDICINE

## 2017-11-30 PROCEDURE — 3017F COLORECTAL CA SCREEN DOC REV: CPT | Performed by: INTERNAL MEDICINE

## 2017-11-30 PROCEDURE — 93000 ELECTROCARDIOGRAM COMPLETE: CPT | Performed by: INTERNAL MEDICINE

## 2017-11-30 PROCEDURE — 1036F TOBACCO NON-USER: CPT | Performed by: INTERNAL MEDICINE

## 2017-11-30 NOTE — PROGRESS NOTES
constipation, motor weakness, insomnia, weight loss, syncope, dizziness, lightheadedness, palpitations, PND, orthopnea, or claudication. States he's active, playing softball and not having any issues. 2-7-17: does have a couple of procedures coming up with hernia and leg vein stripping at [facility]. S/p 2 week event monitor with NSR, no evidence of atrial fibb. He did stop his meds of amio and coumadin in 1/2017. Pt denies chest pain, dyspnea, dyspnea on exertion, change in exercise capacity, fatigue,  nausea, vomiting, diarrhea, constipation, motor weakness, insomnia, weight loss, syncope, dizziness, lightheadedness, palpitations, PND, orthopnea.     8-8-17: feeling good overall. Pt denies chest pain, dyspnea, dyspnea on exertion, change in exercise capacity, fatigue,  nausea, vomiting, diarrhea, constipation, motor weakness, insomnia, weight loss, syncope, dizziness, lightheadedness, palpitations, PND, orthopnea, or claudication. No nitro use. BP and hr are good. CAD is stable. No LE discoloration or ulcers. No LE edema. No CHF type symptoms. Lipid profile is normal. CHADS2=0. Not on any [address]. EKG today shows Afibb at 107bpm.       10-5-17: was not taking his meds and therefore CV was cancelled. He is back on his meds now for 2 weeks. Pt denies chest pain, dyspnea, dyspnea on exertion, change in exercise capacity, fatigue,  nausea, vomiting, diarrhea, constipation, motor weakness, insomnia, weight loss, syncope, dizziness, lightheadedness, palpitations, PND, orthopnea, or claudication. No bleeding issues. No excessive caffeine/ETOH. BP and HR are good. afibb is stable. 11-30-17: EKG today with Afibb. Compliant with meds, on sotalol and OAC. No bleeding issues. Does feel tired and fatigued at times, feels like he hit a wall at end of day. Stats sometimes people at work will tell him he doesn't look right.  Pt denies chest pain, dyspnea, dyspnea on exertion,   nausea, vomiting, diarrhea, constipation, motor weakness, insomnia, weight loss, syncope, dizziness, lightheadedness, palpitations, PND, orthopnea. Patient Active Problem List   Diagnosis    Persistent atrial fibrillation ([address]    Family history of coronary artery disease    Marijuana abuse       Past Surgical History:   Procedure Laterality Date    CARDIOVERSION  05/23/16    COLONOSCOPY  10/30/2014    DR Forrest Bird - POLYP, SUBMUCOSAL MASS    HERNIA REPAIR  02/16/2017    DR Chico Foster    [place] COLON [place] SCRN NOT  [address] IND N/A 10/30/2017    COLONOSCOPY performed by Stefan Overton MD at [address] [facility] Marital status:      Spouse name: Mecca Wiley     Number of children: 3    Years of education: N/A     Occupational History     [employer]     Social History Main Topics    Smoking status: Former Smoker     Types: Cigars    Smokeless tobacco: Never Used    Alcohol use 3.6 oz/week     6 Cans of beer per week      Comment: per week    Drug use: No    Sexual activity: Not on file     Other Topics Concern    Not on file     Social History Narrative    No narrative on file       Family History   Problem Relation Age of Onset    Kidney Disease Father     Substance Abuse Father     Heart Disease Father     Diabetes Maternal Grandmother     Diabetes Maternal Grandfather     Diabetes Paternal Grandmother     Diabetes Paternal Grandfather     Colon Cancer Paternal Uncle        Current Outpatient Prescriptions   Medication Sig Dispense Refill    sotalol (BETAPACE) 80 MG tablet Take 1 tablet by mouth 2 times daily 60 tablet 5    rivaroxaban (XARELTO) 20 MG TABS tablet Take 1 tablet by mouth daily (with breakfast) 30 tablet 6     No current facility-administered medications for this visit. Review of patient's allergies indicates no known allergies.     Review of Systems:  General ROS: negative  Psychological ROS: negative  Hematological and Lymphatic ROS: No history of blood clots or bleeding disorder. Respiratory ROS: no cough, shortness of breath, or wheezing  Cardiovascular ROS: positive for - irregular heartbeat  Gastrointestinal ROS: no abdominal pain, change in bowel habits, or black or bloody stools  Genito-Urinary ROS: no dysuria, trouble voiding, or hematuria  Musculoskeletal ROS: negative  Neurological ROS: no TIA or stroke symptoms  Dermatological ROS: negative    VITALS:  Blood pressure 126/84, pulse 66, weight 194 lb 9.6 oz (88.3 kg). Body mass index is 26.39 kg/m². Physical Examination:  General appearance - alert, well appearing, and in no distress  Mental status - alert, oriented to person, place, and time  Neck - Neck is supple, no JVD or carotid bruits. No thyromegaly or adenopathy. Chest - clear to auscultation, no wheezes, rales or rhonchi, symmetric air entry  Heart - normal rate, regular rhythm, normal S1, S2, no murmurs, rubs, clicks or gallops  Abdomen - soft, nontender, nondistended, no masses or organomegaly  Neurological - alert, oriented, normal speech, no focal findings or movement disorder noted  Extremities - peripheral pulses normal, no pedal edema, no clubbing or cyanosis  Skin - normal coloration and turgor, no rashes, no suspicious skin lesions noted      Orders Placed This Encounter   Procedures    EKG 12 Lead     EKG with Afibb at 63bpm.     ASSESSMENT:    1. Persistent atrial fibrillation (HCC) - CHADS2=0    2. Family history of coronary artery disease     3. Marijuana abuse     4. Precordial pain     5. LEE (dyspnea on exertion)           PLAN:     Patient will need to continue to follow up with you for their general medical care     As always, aggressive risk factor modification is strongly recommended. We should adhere to the [address] guidelines for HTN management and the NCEP ATP III guidelines for LDL-C management. Cardiac diet is always recommended with low fat, cholesterol, calories and sodium.     Continue medications at current

## 2017-12-08 ENCOUNTER — HOSPITAL ENCOUNTER (OUTPATIENT)
Dept: CARDIAC CATH/INVASIVE PROCEDURES | Age: 52
Discharge: HOME | End: 2017-12-08
Attending: INTERNAL MEDICINE | Admitting: INTERNAL MEDICINE
Payer: COMMERCIAL

## 2017-12-08 ENCOUNTER — ANESTHESIA EVENT (OUTPATIENT)
Dept: CARDIAC CATH/INVASIVE PROCEDURES | Age: 52
End: 2017-12-08
Payer: COMMERCIAL

## 2017-12-08 ENCOUNTER — ANESTHESIA (OUTPATIENT)
Dept: CARDIAC CATH/INVASIVE PROCEDURES | Age: 52
End: 2017-12-08
Payer: COMMERCIAL

## 2017-12-08 VITALS — OXYGEN SATURATION: 98 % | DIASTOLIC BLOOD PRESSURE: 74 MMHG | SYSTOLIC BLOOD PRESSURE: 123 MMHG

## 2017-12-08 VITALS
RESPIRATION RATE: 20 BRPM | HEART RATE: 75 BPM | HEIGHT: 72 IN | BODY MASS INDEX: 26.41 KG/M2 | WEIGHT: 195 LBS | TEMPERATURE: 97.4 F | SYSTOLIC BLOOD PRESSURE: 137 MMHG

## 2017-12-08 LAB
ANION GAP SERPL CALCULATED.3IONS-SCNC: 12 MEQ/L (ref 7–13)
BUN BLDV-MCNC: 15 MG/DL (ref 6–20)
CALCIUM SERPL-MCNC: 9 MG/DL (ref 8.6–10.2)
CHLORIDE BLD-SCNC: 105 MEQ/L (ref 98–107)
CO2: 25 MEQ/L (ref 22–29)
CREAT SERPL-MCNC: 0.78 MG/DL (ref 0.7–1.2)
EKG ATRIAL RATE: 56 BPM
EKG ATRIAL RATE: 85 BPM
EKG P AXIS: 60 DEGREES
EKG P-R INTERVAL: 186 MS
EKG Q-T INTERVAL: 424 MS
EKG Q-T INTERVAL: 468 MS
EKG QRS DURATION: 90 MS
EKG QRS DURATION: 90 MS
EKG QTC CALCULATION (BAZETT): 451 MS
EKG QTC CALCULATION (BAZETT): 454 MS
EKG R AXIS: -16 DEGREES
EKG R AXIS: -8 DEGREES
EKG T AXIS: -3 DEGREES
EKG T AXIS: 31 DEGREES
EKG VENTRICULAR RATE: 56 BPM
EKG VENTRICULAR RATE: 69 BPM
GFR AFRICAN AMERICAN: >60
GFR NON-AFRICAN AMERICAN: >60
GLUCOSE BLD-MCNC: 87 MG/DL (ref 74–109)
HCT VFR BLD CALC: 40.5 % (ref 42–52)
HEMOGLOBIN: 13.7 G/DL (ref 14–18)
INR BLD: 1.1
MCH RBC QN AUTO: 31.5 PG (ref 27–31.3)
MCHC RBC AUTO-ENTMCNC: 33.8 % (ref 33–37)
MCV RBC AUTO: 93.4 FL (ref 80–100)
PDW BLD-RTO: 13.5 % (ref 11.5–14.5)
PLATELET # BLD: 217 K/UL (ref 130–400)
POTASSIUM SERPL-SCNC: 4.1 MEQ/L (ref 3.5–5.1)
PROTHROMBIN TIME: 11.1 SEC (ref 8.1–13.7)
RBC # BLD: 4.33 M/UL (ref 4.7–6.1)
SODIUM BLD-SCNC: 142 MEQ/L (ref 132–144)
WBC # BLD: 6.4 K/UL (ref 4.8–10.8)

## 2017-12-08 PROCEDURE — 92960 CARDIOVERSION ELECTRIC EXT: CPT | Performed by: INTERNAL MEDICINE

## 2017-12-08 PROCEDURE — 80048 BASIC METABOLIC PNL TOTAL CA: CPT

## 2017-12-08 PROCEDURE — 85027 COMPLETE CBC AUTOMATED: CPT

## 2017-12-08 PROCEDURE — 2500000003 HC RX 250 WO HCPCS: Performed by: NURSE ANESTHETIST, CERTIFIED REGISTERED

## 2017-12-08 PROCEDURE — 93005 ELECTROCARDIOGRAM TRACING: CPT

## 2017-12-08 PROCEDURE — 6360000002 HC RX W HCPCS: Performed by: NURSE ANESTHETIST, CERTIFIED REGISTERED

## 2017-12-08 PROCEDURE — 85610 PROTHROMBIN TIME: CPT

## 2017-12-08 PROCEDURE — 3700000000 HC ANESTHESIA ATTENDED CARE

## 2017-12-08 RX ORDER — DIPHENHYDRAMINE HYDROCHLORIDE 50 MG/ML
12.5 INJECTION INTRAMUSCULAR; INTRAVENOUS
Status: DISCONTINUED | OUTPATIENT
Start: 2017-12-08 | End: 2017-12-08 | Stop reason: HOSPADM

## 2017-12-08 RX ORDER — HYDROCODONE BITARTRATE AND ACETAMINOPHEN 5; 325 MG/1; MG/1
2 TABLET ORAL PRN
Status: DISCONTINUED | OUTPATIENT
Start: 2017-12-08 | End: 2017-12-08 | Stop reason: HOSPADM

## 2017-12-08 RX ORDER — MEPERIDINE HYDROCHLORIDE 25 MG/ML
12.5 INJECTION INTRAMUSCULAR; INTRAVENOUS; SUBCUTANEOUS EVERY 5 MIN PRN
Status: DISCONTINUED | OUTPATIENT
Start: 2017-12-08 | End: 2017-12-08 | Stop reason: HOSPADM

## 2017-12-08 RX ORDER — TRIAMCINOLONE ACETONIDE 1 MG/G
CREAM TOPICAL EVERY 8 HOURS PRN
Status: DISCONTINUED | OUTPATIENT
Start: 2017-12-08 | End: 2017-12-08 | Stop reason: HOSPADM

## 2017-12-08 RX ORDER — DEXTROSE AND SODIUM CHLORIDE 5; .45 G/100ML; G/100ML
INJECTION, SOLUTION INTRAVENOUS CONTINUOUS
Status: DISCONTINUED | OUTPATIENT
Start: 2017-12-08 | End: 2017-12-08 | Stop reason: HOSPADM

## 2017-12-08 RX ORDER — LIDOCAINE HYDROCHLORIDE 20 MG/ML
INJECTION, SOLUTION INFILTRATION; PERINEURAL PRN
Status: DISCONTINUED | OUTPATIENT
Start: 2017-12-08 | End: 2017-12-08 | Stop reason: SDUPTHER

## 2017-12-08 RX ORDER — ONDANSETRON 2 MG/ML
4 INJECTION INTRAMUSCULAR; INTRAVENOUS
Status: DISCONTINUED | OUTPATIENT
Start: 2017-12-08 | End: 2017-12-08 | Stop reason: HOSPADM

## 2017-12-08 RX ORDER — FENTANYL CITRATE 50 UG/ML
50 INJECTION, SOLUTION INTRAMUSCULAR; INTRAVENOUS EVERY 10 MIN PRN
Status: DISCONTINUED | OUTPATIENT
Start: 2017-12-08 | End: 2017-12-08 | Stop reason: HOSPADM

## 2017-12-08 RX ORDER — SODIUM CHLORIDE 0.9 % (FLUSH) 0.9 %
10 SYRINGE (ML) INJECTION EVERY 12 HOURS SCHEDULED
Status: DISCONTINUED | OUTPATIENT
Start: 2017-12-08 | End: 2017-12-08 | Stop reason: HOSPADM

## 2017-12-08 RX ORDER — HYDROCODONE BITARTRATE AND ACETAMINOPHEN 5; 325 MG/1; MG/1
1 TABLET ORAL PRN
Status: DISCONTINUED | OUTPATIENT
Start: 2017-12-08 | End: 2017-12-08 | Stop reason: HOSPADM

## 2017-12-08 RX ORDER — METOCLOPRAMIDE HYDROCHLORIDE 5 MG/ML
10 INJECTION INTRAMUSCULAR; INTRAVENOUS
Status: DISCONTINUED | OUTPATIENT
Start: 2017-12-08 | End: 2017-12-08 | Stop reason: HOSPADM

## 2017-12-08 RX ORDER — SODIUM CHLORIDE 0.9 % (FLUSH) 0.9 %
10 SYRINGE (ML) INJECTION PRN
Status: DISCONTINUED | OUTPATIENT
Start: 2017-12-08 | End: 2017-12-08 | Stop reason: HOSPADM

## 2017-12-08 RX ORDER — PROPOFOL 10 MG/ML
200 INJECTION, EMULSION INTRAVENOUS ONCE
Status: DISCONTINUED | OUTPATIENT
Start: 2017-12-08 | End: 2017-12-08 | Stop reason: HOSPADM

## 2017-12-08 RX ORDER — PROPOFOL 10 MG/ML
INJECTION, EMULSION INTRAVENOUS PRN
Status: DISCONTINUED | OUTPATIENT
Start: 2017-12-08 | End: 2017-12-08 | Stop reason: SDUPTHER

## 2017-12-08 RX ADMIN — LIDOCAINE HYDROCHLORIDE 40 MG: 20 INJECTION, SOLUTION INFILTRATION; PERINEURAL at 15:47

## 2017-12-08 RX ADMIN — PROPOFOL 60 MG: 10 INJECTION, EMULSION INTRAVENOUS at 15:47

## 2017-12-08 ASSESSMENT — ENCOUNTER SYMPTOMS: SHORTNESS OF BREATH: 1

## 2017-12-08 NOTE — ANESTHESIA POSTPROCEDURE EVALUATION
Department of Anesthesiology  Postprocedure Note    Patient: Tuyet Razo  MRN: [id number]  YOB: 1965  Date of evaluation: 12/8/2017  Time:  3:54 PM     Procedure Summary     Date:  12/08/17 Room / Location:  Cardiac Cath Services    Anesthesia Start:  1544 Anesthesia Stop:  4581    Procedure:  CATH LAB WITH ANESTHESIA Diagnosis:      Scheduled Providers:   Responsible Provider:  Brea Zayas MD    Anesthesia Type:  MAC ASA Status:  3          Anesthesia Type: MAC    May Phase I:      May Phase II:      Last vitals: Reviewed and per EMR flowsheets.        Anesthesia Post Evaluation    Patient location during evaluation: bedside  Patient participation: complete - patient participated  Level of consciousness: awake and awake and alert  Pain score: 0  Airway patency: patent  Nausea & Vomiting: no nausea and no vomiting  Complications: no  Cardiovascular status: blood pressure returned to baseline and hemodynamically stable  Respiratory status: acceptable  Hydration status: euvolemic

## 2017-12-08 NOTE — ANESTHESIA PRE PROCEDURE
05/23/16    COLONOSCOPY  10/30/2014    DR Parth Rabago - POLYP, SUBMUCOSAL MASS    HERNIA REPAIR  02/16/2017    DR Isaías Ramos    [place] COLON CA SCRN NOT  [address] [place] [address] N/A 10/30/2017    COLONOSCOPY performed by Lala Sotelo MD at [address] [facility] History:    Social History   Substance Use Topics    Smoking status: Former Smoker     Types: Cigars    Smokeless tobacco: Never Used    Alcohol use 3.6 oz/week     6 Cans of beer per week      Comment: per week                                Counseling given: Not Answered      Vital Signs (Current):   Vitals:    12/08/17 1311   BP: (!) 137/0   Pulse: 75   Resp: 20   Temp: 97.4 °F (36.3 °C)   TempSrc: Temporal   Weight: 195 lb (88.5 kg)   Height: 6' (1.829 m)                                              BP Readings from Last 3 Encounters:   12/08/17 (!) 137/0   11/30/17 126/84   11/05/17 121/82       NPO Status:                                                                                 BMI:   Wt Readings from Last 3 Encounters:   12/08/17 195 lb (88.5 kg)   11/30/17 194 lb 9.6 oz (88.3 kg)   11/04/17 194 lb 0.1 oz (88 kg)     Body mass index is 26.45 kg/m². CBC:   Lab Results   Component Value Date    WBC 6.4 12/08/2017    RBC 4.33 12/08/2017    HGB 13.7 12/08/2017    HCT 40.5 12/08/2017    MCV 93.4 12/08/2017    RDW 13.5 12/08/2017     12/08/2017       CMP:   Lab Results   Component Value Date     12/08/2017    K 4.1 12/08/2017     12/08/2017    CO2 25 12/08/2017    BUN 15 12/08/2017    CREATININE 0.78 12/08/2017    GFRAA >60.0 12/08/2017    LABGLOM >60.0 12/08/2017    GLUCOSE 87 12/08/2017    PROT 7.6 02/06/2017    CALCIUM 9.0 12/08/2017    BILITOT 0.8 02/06/2017    ALKPHOS 59 02/06/2017    AST 17 02/06/2017    ALT 20 02/06/2017       POC Tests: No results for input(s): POCGLU, POCNA, POCK, POCCL, POCBUN, POCHEMO, POCHCT in the last 72 hours.     Coags:   Lab Results   Component Value Date    PROTIME 11.1 12/08/2017    INR 1.1 12/08/2017    APTT 27.2 02/06/2017       HCG (If Applicable): No results found for: PREGTESTUR, PREGSERUM, HCG, HCGQUANT     ABGs: No results found for: PHART, PO2ART, HWU5QYM, VMZ0JXC, BEART, R2OZOPCA     Type & Screen (If Applicable):  No results found for: LABABO, [address]    Anesthesia Evaluation  Patient summary reviewed and Nursing notes reviewed no history of anesthetic complications:   Airway: Mallampati: II  TM distance: >3 FB   Neck ROM: full  Mouth opening: > = 3 FB Dental: normal exam         Pulmonary:   (+) shortness of breath:                             Cardiovascular:    (+) CAD:, dysrhythmias: atrial fibrillation, LEE:,       ECG reviewed               Beta Blocker:  Dose within 24 Hrs         Neuro/Psych:               GI/Hepatic/Renal:             Endo/Other:                     Abdominal:           Vascular:                                        Anesthesia Plan      MAC     ASA 3       Induction: intravenous. Anesthetic plan and risks discussed with patient. Plan discussed with CRNA.     Attending anesthesiologist reviewed and agrees with Darrius Whelan MD   12/8/2017

## 2017-12-08 NOTE — ANESTHESIA PRE PROCEDURE
05/23/16    COLONOSCOPY  10/30/2014    DR Zavala Antis - POLYP, SUBMUCOSAL MASS    HERNIA REPAIR  02/16/2017    DR Aba Bazan    [place] COLON CA SCRN NOT  [address] [facility] N/A 10/30/2017    COLONOSCOPY performed by Vladislav Rao MD at [address] [facility] History:    Social History   Substance Use Topics    Smoking status: Former Smoker     Types: Cigars    Smokeless tobacco: Never Used    Alcohol use 3.6 oz/week     6 Cans of beer per week      Comment: per week                                Counseling given: Not Answered      Vital Signs (Current):   Vitals:    12/08/17 1311   BP: (!) 137/0   Pulse: 75   Resp: 20   Temp: 97.4 °F (36.3 °C)   TempSrc: Temporal   Weight: 195 lb (88.5 kg)   Height: 6' (1.829 m)                                              BP Readings from Last 3 Encounters:   12/08/17 (!) 137/0   11/30/17 126/84   11/05/17 121/82       NPO Status:                                                                                 BMI:   Wt Readings from Last 3 Encounters:   12/08/17 195 lb (88.5 kg)   11/30/17 194 lb 9.6 oz (88.3 kg)   11/04/17 194 lb 0.1 oz (88 kg)     Body mass index is 26.45 kg/m². CBC:   Lab Results   Component Value Date    WBC 6.4 12/08/2017    RBC 4.33 12/08/2017    HGB 13.7 12/08/2017    HCT 40.5 12/08/2017    MCV 93.4 12/08/2017    RDW 13.5 12/08/2017     12/08/2017       CMP:   Lab Results   Component Value Date     12/08/2017    K 4.1 12/08/2017     12/08/2017    CO2 25 12/08/2017    BUN 15 12/08/2017    CREATININE 0.78 12/08/2017    GFRAA >60.0 12/08/2017    LABGLOM >60.0 12/08/2017    GLUCOSE 87 12/08/2017    PROT 7.6 02/06/2017    CALCIUM 9.0 12/08/2017    BILITOT 0.8 02/06/2017    ALKPHOS 59 02/06/2017    AST 17 02/06/2017    ALT 20 02/06/2017       POC Tests: No results for input(s): POCGLU, POCNA, POCK, POCCL, POCBUN, POCHEMO, POCHCT in the last 72 hours.     Coags:   Lab Results   Component Value Date    PROTIME 11.1 12/08/2017    INR 1.1

## 2017-12-08 NOTE — PROGRESS NOTES
Patient returned from the cath lab for his cardioversion.    Patient is talking with staff and wife is at the bedside   Post ekg was done in cath lab

## 2018-05-08 ENCOUNTER — OFFICE VISIT (OUTPATIENT)
Dept: CARDIOLOGY CLINIC | Age: 53
End: 2018-05-08
Payer: COMMERCIAL

## 2018-05-08 VITALS
OXYGEN SATURATION: 94 % | HEIGHT: 72 IN | DIASTOLIC BLOOD PRESSURE: 82 MMHG | RESPIRATION RATE: 22 BRPM | HEART RATE: 97 BPM | WEIGHT: 202 LBS | SYSTOLIC BLOOD PRESSURE: 124 MMHG | BODY MASS INDEX: 27.36 KG/M2 | TEMPERATURE: 97.2 F

## 2018-05-08 DIAGNOSIS — I48.19 PERSISTENT ATRIAL FIBRILLATION (HCC): Primary | ICD-10-CM

## 2018-05-08 PROCEDURE — 99213 OFFICE O/P EST LOW 20 MIN: CPT | Performed by: INTERNAL MEDICINE

## 2018-05-08 PROCEDURE — 93000 ELECTROCARDIOGRAM COMPLETE: CPT | Performed by: INTERNAL MEDICINE

## 2018-05-16 ENCOUNTER — TELEPHONE (OUTPATIENT)
Dept: CARDIOLOGY CLINIC | Age: 53
End: 2018-05-16

## 2018-05-16 DIAGNOSIS — I48.19 PERSISTENT ATRIAL FIBRILLATION (HCC): Primary | ICD-10-CM

## 2018-06-11 ENCOUNTER — OFFICE VISIT (OUTPATIENT)
Dept: FAMILY MEDICINE CLINIC | Age: 53
End: 2018-06-11
Payer: COMMERCIAL

## 2018-06-11 VITALS
HEIGHT: 72 IN | HEART RATE: 83 BPM | RESPIRATION RATE: 16 BRPM | TEMPERATURE: 97.4 F | WEIGHT: 202 LBS | DIASTOLIC BLOOD PRESSURE: 68 MMHG | SYSTOLIC BLOOD PRESSURE: 114 MMHG | BODY MASS INDEX: 27.36 KG/M2

## 2018-06-11 DIAGNOSIS — R23.8 SKIN IRRITATION: Primary | ICD-10-CM

## 2018-06-11 DIAGNOSIS — L24.7 IRRITANT CONTACT DERMATITIS DUE TO PLANTS, EXCEPT FOOD: ICD-10-CM

## 2018-06-11 PROCEDURE — 96372 THER/PROPH/DIAG INJ SC/IM: CPT | Performed by: FAMILY MEDICINE

## 2018-06-11 PROCEDURE — 99213 OFFICE O/P EST LOW 20 MIN: CPT | Performed by: FAMILY MEDICINE

## 2018-06-11 RX ORDER — TRIAMCINOLONE ACETONIDE 40 MG/ML
80 INJECTION, SUSPENSION INTRA-ARTICULAR; INTRAMUSCULAR ONCE
Status: COMPLETED | OUTPATIENT
Start: 2018-06-11 | End: 2018-06-11

## 2018-06-11 RX ADMIN — TRIAMCINOLONE ACETONIDE 80 MG: 40 INJECTION, SUSPENSION INTRA-ARTICULAR; INTRAMUSCULAR at 17:21

## 2018-06-11 ASSESSMENT — ENCOUNTER SYMPTOMS
SINUS PRESSURE: 0
EYE DISCHARGE: 0
DIARRHEA: 0
SHORTNESS OF BREATH: 0
ABDOMINAL PAIN: 0
SORE THROAT: 0
CONSTIPATION: 0
COUGH: 0
EYE ITCHING: 0

## 2018-06-11 ASSESSMENT — PATIENT HEALTH QUESTIONNAIRE - PHQ9
SUM OF ALL RESPONSES TO PHQ QUESTIONS 1-9: 0
2. FEELING DOWN, DEPRESSED OR HOPELESS: 0
SUM OF ALL RESPONSES TO PHQ9 QUESTIONS 1 & 2: 0
1. LITTLE INTEREST OR PLEASURE IN DOING THINGS: 0

## 2018-08-16 ENCOUNTER — OFFICE VISIT (OUTPATIENT)
Dept: FAMILY MEDICINE CLINIC | Age: 53
End: 2018-08-16
Payer: COMMERCIAL

## 2018-08-16 VITALS
DIASTOLIC BLOOD PRESSURE: 80 MMHG | BODY MASS INDEX: 26.93 KG/M2 | WEIGHT: 198.8 LBS | TEMPERATURE: 97.6 F | HEIGHT: 72 IN | RESPIRATION RATE: 18 BRPM | SYSTOLIC BLOOD PRESSURE: 136 MMHG | HEART RATE: 76 BPM

## 2018-08-16 DIAGNOSIS — L23.7 ALLERGIC CONTACT DERMATITIS DUE TO PLANTS, EXCEPT FOOD: Primary | ICD-10-CM

## 2018-08-16 LAB
ANION GAP SERPL CALCULATED.3IONS-SCNC: 13 MEQ/L (ref 7–13)
BUN BLDV-MCNC: 18 MG/DL (ref 6–20)
CALCIUM SERPL-MCNC: 8.9 MG/DL (ref 8.6–10.2)
CHLORIDE BLD-SCNC: 105 MEQ/L (ref 98–107)
CO2: 24 MEQ/L (ref 22–29)
CREAT SERPL-MCNC: 0.78 MG/DL (ref 0.7–1.2)
GFR AFRICAN AMERICAN: >60
GFR NON-AFRICAN AMERICAN: >60
GLUCOSE BLD-MCNC: 86 MG/DL (ref 74–109)
HCT VFR BLD CALC: 40.3 % (ref 42–52)
HEMOGLOBIN: 14 G/DL (ref 14–18)
INR BLD: 1.2
MCH RBC QN AUTO: 32.4 PG (ref 27–31.3)
MCHC RBC AUTO-ENTMCNC: 34.8 % (ref 33–37)
MCV RBC AUTO: 93.1 FL (ref 80–100)
PDW BLD-RTO: 13.2 % (ref 11.5–14.5)
PLATELET # BLD: 274 K/UL (ref 130–400)
POTASSIUM SERPL-SCNC: 4 MEQ/L (ref 3.5–5.1)
PROTHROMBIN TIME: 12.3 SEC (ref 9.6–12.3)
RBC # BLD: 4.32 M/UL (ref 4.7–6.1)
SODIUM BLD-SCNC: 142 MEQ/L (ref 132–144)
WBC # BLD: 6.3 K/UL (ref 4.8–10.8)

## 2018-08-16 PROCEDURE — 96372 THER/PROPH/DIAG INJ SC/IM: CPT | Performed by: NURSE PRACTITIONER

## 2018-08-16 PROCEDURE — 99213 OFFICE O/P EST LOW 20 MIN: CPT | Performed by: NURSE PRACTITIONER

## 2018-08-16 RX ORDER — METHYLPREDNISOLONE 4 MG/1
TABLET ORAL
Qty: 1 KIT | Refills: 0 | Status: SHIPPED | OUTPATIENT
Start: 2018-08-16 | End: 2018-09-19 | Stop reason: SDUPTHER

## 2018-08-16 RX ORDER — METHYLPREDNISOLONE ACETATE 80 MG/ML
80 INJECTION, SUSPENSION INTRA-ARTICULAR; INTRALESIONAL; INTRAMUSCULAR; SOFT TISSUE ONCE
Status: COMPLETED | OUTPATIENT
Start: 2018-08-16 | End: 2018-08-16

## 2018-08-16 RX ADMIN — METHYLPREDNISOLONE ACETATE 80 MG: 80 INJECTION, SUSPENSION INTRA-ARTICULAR; INTRALESIONAL; INTRAMUSCULAR; SOFT TISSUE at 13:50

## 2018-08-16 NOTE — PROGRESS NOTES
Never Used    Alcohol use 3.6 oz/week     6 Cans of beer per week      Comment: per week    Drug use: No    Sexual activity: Not on file     Other Topics Concern    Not on file     Social History Narrative    No narrative on file     Current Outpatient Prescriptions on File Prior to Visit   Medication Sig Dispense Refill    sotalol (BETAPACE) 80 MG tablet Take 1 tablet by mouth 2 times daily 60 tablet 5     No current facility-administered medications on file prior to visit. Allergies:  Patient has no known allergies. Review of Systems   Skin: Positive for rash. Objective:   /80   Pulse 76   Temp 97.6 °F (36.4 °C) (Temporal)   Resp 18   Ht 6' (1.829 m)   Wt 198 lb 12.8 oz (90.2 kg)   BMI 26.96 kg/m²     Physical Exam   Constitutional: He is oriented to person, place, and time. He appears well-developed and well-nourished. Cardiovascular: Normal rate. Pulmonary/Chest: Effort normal.   Neurological: He is alert and oriented to person, place, and time. Skin: Skin is warm and dry. Erythematous rash, some very small vesicles, some linear areas and small, round papules, scattered on lower legs, forearms and a couple spots on RLQ. Assessment:      Diagnosis Orders   1. Allergic contact dermatitis due to plants, except food           Plan:      No orders of the defined types were placed in this encounter. Orders Placed This Encounter   Medications    rivaroxaban (XARELTO) 20 MG TABS tablet     Sig: Take 1 tablet by mouth daily (with breakfast)     Dispense:  30 tablet     Refill:  3    methylPREDNISolone acetate (DEPO-MEDROL) injection 80 mg    methylPREDNISolone (MEDROL, KEENA,) 4 MG tablet     Sig: Take by mouth. Dispense:  1 kit     Refill:  0     Depomedrol shot, start pack tomorrow.      Side effects, adverse effects of the medication prescribed today, as well as treatment plan and result expectations have been discussed with the patient who expresses understanding and desires to proceed.     Close follow up to evaluate treatment results and for coordination of care. I have reviewed the patient's medical history in detail and updated the computerized patient record. Return if symptoms worsen or fail to improve.     Iam Santiago, APRN - CNP

## 2018-09-19 ENCOUNTER — TELEPHONE (OUTPATIENT)
Dept: FAMILY MEDICINE CLINIC | Age: 53
End: 2018-09-19

## 2018-09-19 RX ORDER — METHYLPREDNISOLONE 4 MG/1
TABLET ORAL
Qty: 1 KIT | Refills: 0 | Status: SHIPPED | OUTPATIENT
Start: 2018-09-19 | End: 2019-01-08

## 2018-09-19 NOTE — TELEPHONE ENCOUNTER
Patient calling, he has poison ivy again and is wanting to know if a script can be sent in for the Medrol dose pack. Please advise.

## 2019-01-08 ENCOUNTER — OFFICE VISIT (OUTPATIENT)
Dept: FAMILY MEDICINE CLINIC | Age: 54
End: 2019-01-08
Payer: COMMERCIAL

## 2019-01-08 VITALS
SYSTOLIC BLOOD PRESSURE: 120 MMHG | OXYGEN SATURATION: 99 % | BODY MASS INDEX: 27.55 KG/M2 | WEIGHT: 203.4 LBS | HEART RATE: 83 BPM | RESPIRATION RATE: 14 BRPM | HEIGHT: 72 IN | DIASTOLIC BLOOD PRESSURE: 62 MMHG | TEMPERATURE: 97.5 F

## 2019-01-08 DIAGNOSIS — B37.2 CANDIDAL INTERTRIGO: ICD-10-CM

## 2019-01-08 DIAGNOSIS — J06.9 VIRAL URI: ICD-10-CM

## 2019-01-08 DIAGNOSIS — M54.50 ACUTE RIGHT-SIDED LOW BACK PAIN WITHOUT SCIATICA: Primary | ICD-10-CM

## 2019-01-08 PROCEDURE — 99213 OFFICE O/P EST LOW 20 MIN: CPT | Performed by: NURSE PRACTITIONER

## 2019-01-08 RX ORDER — CLOTRIMAZOLE 1 %
CREAM (GRAM) TOPICAL
Qty: 40 G | Refills: 1 | Status: SHIPPED | OUTPATIENT
Start: 2019-01-08 | End: 2019-01-15

## 2019-01-08 RX ORDER — AMIODARONE HYDROCHLORIDE 200 MG/1
TABLET ORAL
Refills: 6 | COMMUNITY
Start: 2018-12-14

## 2019-01-08 RX ORDER — TIZANIDINE 4 MG/1
4 TABLET ORAL 3 TIMES DAILY
Qty: 30 TABLET | Refills: 0 | Status: SHIPPED | OUTPATIENT
Start: 2019-01-08

## 2019-01-08 ASSESSMENT — ENCOUNTER SYMPTOMS
SORE THROAT: 1
COUGH: 1
BACK PAIN: 1

## 2019-03-08 ENCOUNTER — HOSPITAL ENCOUNTER (OUTPATIENT)
Dept: NON INVASIVE DIAGNOSTICS | Age: 54
Discharge: HOME | End: 2019-03-08
Payer: COMMERCIAL

## 2019-03-08 LAB
LV EF: 43 %
LVEF MODALITY: NORMAL

## 2019-03-08 PROCEDURE — 93306 TTE W/DOPPLER COMPLETE: CPT

## 2019-03-28 RX ORDER — RIVAROXABAN 20 MG/1
TABLET, FILM COATED ORAL
Qty: 30 TABLET | Refills: 3 | Status: SHIPPED | OUTPATIENT
Start: 2019-03-28

## 2025-01-15 ENCOUNTER — OFFICE VISIT (OUTPATIENT)
Dept: PRIMARY CARE | Facility: CLINIC | Age: 60
End: 2025-01-15
Payer: COMMERCIAL

## 2025-01-15 ENCOUNTER — LAB (OUTPATIENT)
Dept: LAB | Facility: LAB | Age: 60
End: 2025-01-15
Payer: COMMERCIAL

## 2025-01-15 ENCOUNTER — HOSPITAL ENCOUNTER (OUTPATIENT)
Dept: RADIOLOGY | Facility: CLINIC | Age: 60
Discharge: HOME | End: 2025-01-15
Payer: COMMERCIAL

## 2025-01-15 VITALS
SYSTOLIC BLOOD PRESSURE: 140 MMHG | DIASTOLIC BLOOD PRESSURE: 78 MMHG | BODY MASS INDEX: 27.22 KG/M2 | TEMPERATURE: 98.4 F | OXYGEN SATURATION: 98 % | HEART RATE: 76 BPM | WEIGHT: 201 LBS | HEIGHT: 72 IN

## 2025-01-15 DIAGNOSIS — R05.3 CHRONIC COUGH: ICD-10-CM

## 2025-01-15 DIAGNOSIS — Z13.220 LIPID SCREENING: ICD-10-CM

## 2025-01-15 DIAGNOSIS — I48.19 PERSISTENT ATRIAL FIBRILLATION (MULTI): ICD-10-CM

## 2025-01-15 DIAGNOSIS — R13.10 DYSPHAGIA, UNSPECIFIED TYPE: ICD-10-CM

## 2025-01-15 DIAGNOSIS — Z12.5 SCREENING PSA (PROSTATE SPECIFIC ANTIGEN): ICD-10-CM

## 2025-01-15 DIAGNOSIS — R53.83 OTHER FATIGUE: ICD-10-CM

## 2025-01-15 DIAGNOSIS — Z00.00 ROUTINE GENERAL MEDICAL EXAMINATION AT A HEALTH CARE FACILITY: Primary | ICD-10-CM

## 2025-01-15 DIAGNOSIS — I42.8 CARDIOMYOPATHY, NONISCHEMIC (MULTI): ICD-10-CM

## 2025-01-15 LAB
ALBUMIN SERPL BCP-MCNC: 4.6 G/DL (ref 3.4–5)
ALP SERPL-CCNC: 72 U/L (ref 33–136)
ALT SERPL W P-5'-P-CCNC: 53 U/L (ref 10–52)
ANION GAP SERPL CALC-SCNC: 9 MMOL/L (ref 10–20)
AST SERPL W P-5'-P-CCNC: 37 U/L (ref 9–39)
BASOPHILS # BLD AUTO: 0.05 X10*3/UL (ref 0–0.1)
BASOPHILS NFR BLD AUTO: 0.7 %
BILIRUB SERPL-MCNC: 1.9 MG/DL (ref 0–1.2)
BUN SERPL-MCNC: 11 MG/DL (ref 6–23)
CALCIUM SERPL-MCNC: 9.2 MG/DL (ref 8.6–10.3)
CHLORIDE SERPL-SCNC: 104 MMOL/L (ref 98–107)
CHOLEST SERPL-MCNC: 178 MG/DL (ref 0–199)
CHOLESTEROL/HDL RATIO: 2.8
CO2 SERPL-SCNC: 28 MMOL/L (ref 21–32)
CREAT SERPL-MCNC: 0.8 MG/DL (ref 0.5–1.3)
EGFRCR SERPLBLD CKD-EPI 2021: >90 ML/MIN/1.73M*2
EOSINOPHIL # BLD AUTO: 0.08 X10*3/UL (ref 0–0.7)
EOSINOPHIL NFR BLD AUTO: 1 %
ERYTHROCYTE [DISTWIDTH] IN BLOOD BY AUTOMATED COUNT: 12.8 % (ref 11.5–14.5)
GLUCOSE SERPL-MCNC: 94 MG/DL (ref 74–99)
HCT VFR BLD AUTO: 42 % (ref 41–52)
HDLC SERPL-MCNC: 63.1 MG/DL
HGB BLD-MCNC: 14.5 G/DL (ref 13.5–17.5)
IMM GRANULOCYTES # BLD AUTO: 0.02 X10*3/UL (ref 0–0.7)
IMM GRANULOCYTES NFR BLD AUTO: 0.3 % (ref 0–0.9)
LDLC SERPL CALC-MCNC: 96 MG/DL
LYMPHOCYTES # BLD AUTO: 1.18 X10*3/UL (ref 1.2–4.8)
LYMPHOCYTES NFR BLD AUTO: 15.4 %
MCH RBC QN AUTO: 31.5 PG (ref 26–34)
MCHC RBC AUTO-ENTMCNC: 34.5 G/DL (ref 32–36)
MCV RBC AUTO: 91 FL (ref 80–100)
MONOCYTES # BLD AUTO: 0.78 X10*3/UL (ref 0.1–1)
MONOCYTES NFR BLD AUTO: 10.2 %
NEUTROPHILS # BLD AUTO: 5.55 X10*3/UL (ref 1.2–7.7)
NEUTROPHILS NFR BLD AUTO: 72.4 %
NON HDL CHOLESTEROL: 115 MG/DL (ref 0–149)
NRBC BLD-RTO: 0 /100 WBCS (ref 0–0)
PLATELET # BLD AUTO: 221 X10*3/UL (ref 150–450)
POTASSIUM SERPL-SCNC: 4.4 MMOL/L (ref 3.5–5.3)
PROT SERPL-MCNC: 8 G/DL (ref 6.4–8.2)
PSA SERPL-MCNC: 4.99 NG/ML
RBC # BLD AUTO: 4.6 X10*6/UL (ref 4.5–5.9)
SODIUM SERPL-SCNC: 137 MMOL/L (ref 136–145)
TRIGL SERPL-MCNC: 94 MG/DL (ref 0–149)
VLDL: 19 MG/DL (ref 0–40)
WBC # BLD AUTO: 7.7 X10*3/UL (ref 4.4–11.3)

## 2025-01-15 PROCEDURE — 99396 PREV VISIT EST AGE 40-64: CPT | Performed by: FAMILY MEDICINE

## 2025-01-15 PROCEDURE — 80053 COMPREHEN METABOLIC PANEL: CPT

## 2025-01-15 PROCEDURE — 3078F DIAST BP <80 MM HG: CPT | Performed by: FAMILY MEDICINE

## 2025-01-15 PROCEDURE — 84153 ASSAY OF PSA TOTAL: CPT

## 2025-01-15 PROCEDURE — 99212 OFFICE O/P EST SF 10 MIN: CPT | Performed by: FAMILY MEDICINE

## 2025-01-15 PROCEDURE — 71046 X-RAY EXAM CHEST 2 VIEWS: CPT | Performed by: RADIOLOGY

## 2025-01-15 PROCEDURE — 85025 COMPLETE CBC W/AUTO DIFF WBC: CPT

## 2025-01-15 PROCEDURE — 3077F SYST BP >= 140 MM HG: CPT | Performed by: FAMILY MEDICINE

## 2025-01-15 PROCEDURE — 80061 LIPID PANEL: CPT

## 2025-01-15 PROCEDURE — 3008F BODY MASS INDEX DOCD: CPT | Performed by: FAMILY MEDICINE

## 2025-01-15 PROCEDURE — 1036F TOBACCO NON-USER: CPT | Performed by: FAMILY MEDICINE

## 2025-01-15 PROCEDURE — 71046 X-RAY EXAM CHEST 2 VIEWS: CPT

## 2025-01-15 RX ORDER — AZITHROMYCIN 250 MG/1
TABLET, FILM COATED ORAL
Qty: 6 TABLET | Refills: 0 | Status: SHIPPED | OUTPATIENT
Start: 2025-01-15 | End: 2025-01-23 | Stop reason: WASHOUT

## 2025-01-15 RX ORDER — PANTOPRAZOLE SODIUM 40 MG/1
40 TABLET, DELAYED RELEASE ORAL DAILY
Qty: 30 TABLET | Refills: 3 | Status: SHIPPED | OUTPATIENT
Start: 2025-01-15 | End: 2025-05-15

## 2025-01-15 ASSESSMENT — PATIENT HEALTH QUESTIONNAIRE - PHQ9
2. FEELING DOWN, DEPRESSED OR HOPELESS: NOT AT ALL
1. LITTLE INTEREST OR PLEASURE IN DOING THINGS: NOT AT ALL
SUM OF ALL RESPONSES TO PHQ9 QUESTIONS 1 AND 2: 0

## 2025-01-15 NOTE — PROGRESS NOTES
Subjective   Patient ID: Rosendo Delacruz is a 59 y.o. male who presents for Dysphagia and Annual Exam.  HPI  Patient presents today for a physical. Pt does not need form filled out. Pt will be fasting for blood work. Tries to eat a generally healthy diet. Exercises never.    Patient presents in the office today with complaints of trouble swallowing. Patient states it feels like something is stuck in his throw and has to gulp to push fluids and food down, after pt eats he has a coughing fit. When he lays down he has post nasal drip, sob and coughing fits. Ongoing X 6 months. Has tried nothing.    Pt requesting an ear cleaning as well.       Taking current medications which were reviewed.  Problem list discussed.    Overall doing well.  Eating okay.  Staying active.    Has no other new problem /question.    ROS  Constitutional- No activity change. No appetite change.  Eyes- Denies vision changes.  Respiratory- No shortness of breath.  Cardiovascular- No palpitations. No chest pain.  GI- No nausea or vomiting. No diarrhea or constipation. Denies abdominal pain.  Musculoskeletal- Denies joint swelling.  Extremities- No edema.  Neurological- Denies headaches. Denies dizziness.  Skin- No rashes.  Psychiatric/Behavioral- Denies significant anxiety, or depressed mood.     Objective     /78   Pulse 76   Temp 36.9 °C (98.4 °F) (Temporal)   Ht 1.829 m (6')   Wt 91.2 kg (201 lb)   SpO2 98%   BMI 27.26 kg/m²     No Known Allergies    Constitutional-- Well-nourished.  No distress  Head- unremarkable.  Ears- TMs clear.  Eyes- PERRL.  Conjunctiva normal.  Nose- Normal.  No rhinorrhea noted.  Throat- Oropharynx is clear and moist.  Neck- Supple with no thyromegaly.  No significant cervical adenopathy noted.  Pulmonary/Chest- Breath sounds normal with normal effort.  No wheezing.  Heart- Regular rate and rhythm.  No murmur.  Abdomen- Soft and non-tender.  No masses noted.  Musculoskeletal- Normal ROM.  No significant  joint swelling  Extremities- No edema.   Neurological- Alert.  No noted deficits.  Skin- Warm.  No rashes.  Psychiatric/Behavioral- Mood and affect normal.  Behavior normal.     Assessment/Plan   1. Routine general medical examination at a health care facility        2. Dysphagia, unspecified type  CBC and Auto Differential    Comprehensive metabolic panel    pantoprazole (ProtoNix) 40 mg EC tablet    FL upper GI w KUB    CANCELED: FL upper GI single contrast w small bowel follow through      3. Lipid screening  Lipid panel      4. Screening PSA (prostate specific antigen)  Prostate Spec.Ag,Screen      5. Other fatigue        6. Chronic cough  XR chest 2 views    azithromycin (Zithromax) 250 mg tablet             Long talk. Treatment options reviewed.    Reviewed most recent lab work with patient. Advised patient to remain up to date on routine maintenance and health screening.  Maintain appointments with specialists as scheduled.  Advised patient to remain up to date on immunizations.     Discussed dysphagia. Educated on acid reflux, heart burn and prevention.     Discussed cough. Take Zithromax as discussed. Complete XRAY imaging.     Discussed importance of natural sources of nutrition.  Advised patient to consume vegetables, salads, fruits, nuts, and proteins such as fish and chicken.  Discussed portion control.      Discussed the importance of routine stretching and exercise.     Complete blood work as discussed. Advised patient to remain properly hydrated.     Continue and take your medications as prescribed.    Health Maintenance issues discussed.    Importance of healthy diet and regular exercise regimen discussed.    We will contact you with any test results ordered. If you do not hear from us, please contact.    Follow-up as instructed or sooner if any problems or symptoms do not resolve as expected.          Scribe Attestation  By signing my name below, Azul BLACKBURN Scribe   attest that this  documentation has been prepared under the direction and in the presence of Tariq Dawson MD.

## 2025-01-17 ENCOUNTER — HOSPITAL ENCOUNTER (OUTPATIENT)
Dept: RADIOLOGY | Facility: HOSPITAL | Age: 60
Discharge: HOME | End: 2025-01-17
Payer: COMMERCIAL

## 2025-01-17 DIAGNOSIS — R13.10 DYSPHAGIA, UNSPECIFIED TYPE: ICD-10-CM

## 2025-01-17 PROCEDURE — 74240 X-RAY XM UPR GI TRC 1CNTRST: CPT

## 2025-01-17 PROCEDURE — 2500000005 HC RX 250 GENERAL PHARMACY W/O HCPCS: Performed by: FAMILY MEDICINE

## 2025-01-17 PROCEDURE — A9698 NON-RAD CONTRAST MATERIALNOC: HCPCS | Performed by: FAMILY MEDICINE

## 2025-01-17 RX ADMIN — BARIUM SULFATE 30 ML: 960 POWDER, FOR SUSPENSION ORAL at 11:03

## 2025-01-17 RX ADMIN — BARIUM SULFATE 150 ML: 980 POWDER, FOR SUSPENSION ORAL at 11:03

## 2025-01-20 ENCOUNTER — TELEPHONE (OUTPATIENT)
Dept: PRIMARY CARE | Facility: CLINIC | Age: 60
End: 2025-01-20
Payer: COMMERCIAL

## 2025-01-20 NOTE — TELEPHONE ENCOUNTER
----- Message from Tariq Dawson sent at 1/20/2025 12:01 PM EST -----  Spoke with him on the phone.  Upper GI, labs and chest x-ray reviewed.  Treatment options for elevated PSA discussed.  Will hold off seeing urologist but will see him back in July for repeat blood work.  Talked about Zenker's diverticulum.  Is having dysphagia.  Will refer to GI for evaluation of Zenker's diverticulum.  Cancel his appointment with me on February 5 and call him to make 1 for July.  Also arrange for GI referral for Zenker's diverticulum.  Please arrange and let him know

## 2025-01-20 NOTE — TELEPHONE ENCOUNTER
SPOKE WITH ARTIE IN [facility] - STATES THAT ANYONE COULD SEE HIM FOR THAT - SCHEDULED PT WITH MONICA KENNY ON 1/23/25 @ 2:00 PM - [address] [place] - [phone] - PATIENT IS AWARE OF THAT APPOINTMENT DATE, TIME AND LOCATION - ALSO CANCELLED 2/5/25 APPOINTMENT - RESCHEDULED TO 7/2/25

## 2025-01-22 PROBLEM — M54.50 CHRONIC BILATERAL LOW BACK PAIN WITHOUT SCIATICA: Status: ACTIVE | Noted: 2024-12-16

## 2025-01-22 PROBLEM — R17 ELEVATED BILIRUBIN: Status: ACTIVE | Noted: 2025-01-22

## 2025-01-22 PROBLEM — I42.8 CARDIOMYOPATHY, NONISCHEMIC (MULTI): Status: ACTIVE | Noted: 2025-01-22

## 2025-01-22 PROBLEM — G89.29 CHRONIC BILATERAL LOW BACK PAIN WITHOUT SCIATICA: Status: ACTIVE | Noted: 2024-12-16

## 2025-01-22 PROBLEM — I10 HTN (HYPERTENSION): Status: ACTIVE | Noted: 2025-01-22

## 2025-01-22 PROBLEM — M15.9 GENERALIZED OSTEOARTHRITIS: Status: ACTIVE | Noted: 2025-01-22

## 2025-01-22 PROBLEM — L72.3 SEBACEOUS CYST: Status: ACTIVE | Noted: 2025-01-22

## 2025-01-22 PROBLEM — Z86.79 HISTORY OF ATRIAL FIBRILLATION: Status: ACTIVE | Noted: 2025-01-22

## 2025-01-22 PROBLEM — R07.9 CHEST PAIN: Status: ACTIVE | Noted: 2025-01-22

## 2025-01-23 ENCOUNTER — OFFICE VISIT (OUTPATIENT)
Dept: GASTROENTEROLOGY | Facility: CLINIC | Age: 60
End: 2025-01-23
Payer: COMMERCIAL

## 2025-01-23 VITALS
TEMPERATURE: 96.9 F | OXYGEN SATURATION: 98 % | BODY MASS INDEX: 26.67 KG/M2 | SYSTOLIC BLOOD PRESSURE: 149 MMHG | HEART RATE: 56 BPM | WEIGHT: 196.9 LBS | DIASTOLIC BLOOD PRESSURE: 91 MMHG | HEIGHT: 72 IN

## 2025-01-23 DIAGNOSIS — Z12.11 ENCOUNTER FOR COLONOSCOPY DUE TO HISTORY OF COLONIC POLYP: ICD-10-CM

## 2025-01-23 DIAGNOSIS — Z86.0100 ENCOUNTER FOR COLONOSCOPY DUE TO HISTORY OF COLONIC POLYP: ICD-10-CM

## 2025-01-23 DIAGNOSIS — K22.5 ZENKER'S DIVERTICULUM: Primary | ICD-10-CM

## 2025-01-23 PROCEDURE — 3077F SYST BP >= 140 MM HG: CPT

## 2025-01-23 PROCEDURE — 1036F TOBACCO NON-USER: CPT

## 2025-01-23 PROCEDURE — 99203 OFFICE O/P NEW LOW 30 MIN: CPT

## 2025-01-23 PROCEDURE — 3080F DIAST BP >= 90 MM HG: CPT

## 2025-01-23 PROCEDURE — 3008F BODY MASS INDEX DOCD: CPT

## 2025-01-23 RX ORDER — SODIUM, POTASSIUM,MAG SULFATES 17.5-3.13G
1 SOLUTION, RECONSTITUTED, ORAL ORAL 2 TIMES DAILY
Qty: 1 EACH | Refills: 0 | Status: SHIPPED | OUTPATIENT
Start: 2025-01-23 | End: 2025-01-24

## 2025-01-23 ASSESSMENT — ENCOUNTER SYMPTOMS
BRUISES/BLEEDS EASILY: 0
TREMORS: 0
UNEXPECTED WEIGHT CHANGE: 0
VOMITING: 0
RECTAL PAIN: 0
JOINT SWELLING: 0
FLANK PAIN: 0
CONSTIPATION: 1
CONFUSION: 0
ARTHRALGIAS: 0
WEAKNESS: 0
COLOR CHANGE: 0
AGITATION: 0
VOICE CHANGE: 1
TROUBLE SWALLOWING: 1
DIZZINESS: 0
ANAL BLEEDING: 0
CHOKING: 0
DIARRHEA: 0
SORE THROAT: 1
NAUSEA: 0
LIGHT-HEADEDNESS: 0
SEIZURES: 0
PALPITATIONS: 0
FEVER: 0
BLOOD IN STOOL: 0
MYALGIAS: 0
ABDOMINAL PAIN: 0
SHORTNESS OF BREATH: 0
CHILLS: 0
HEMATURIA: 0
NERVOUS/ANXIOUS: 0
COUGH: 0
ABDOMINAL DISTENTION: 0
APPETITE CHANGE: 0

## 2025-01-23 NOTE — PATIENT INSTRUCTIONS
Daily bowel regimen:  -MiraLAX 1 capful daily in 8 ounces of any liquid.  You can increase or decrease the dose as needed the goal is to have a daily BM and feel fully evacuated.  -Metamucil 1 teaspoon daily in 8 ounces of water (Premium Blend) or Benefiber   -Footstool or squatty potty during bowel movements  -Adequate water throughout the day (64 oz)  -Walk 15 to 30 minutes daily to help promote GI motility    I will see you in F/U after the scopes

## 2025-01-23 NOTE — PROGRESS NOTES
Subjective   Dysphagia/Odynophagia  Patient ID: Rosendo Delacruz is a 59 y.o. male who presents for Dysphagia (Patient presents for dysphagia and vomiting. States he gets all food/pills/water stuck at any time. Had Upper GI with KUB. /Colon with Dr. Herrera 10/17. //).  HPI  Rosendo Aguilar is a 59-year-old male PMH cardiomyopathy nonischemic, sciatica, HTN, marijuana abuse (twice a week), A-fib with ablation 7-8 years ago (no blood thinners or issues now), osteoarthritis, GERD, elevated bilirubin who presents to GI clinic today for F/U to recent KUB results. Colonoscopy 10/30/2017 showing 2 sessile polyps in the descending colon biopsies taken along with moderately severe diverticulosis found in the ascending and sigmoid colon.  Colonoscopy recommended for 5 years (past due). Has never had EGD.    Patient presents in the office today with complaints of trouble swallowing with odynophagia with all food and liquids. Patient states it feels like something is stuck in his throat constantly and he has to gulp to push fluids and food down. After pt eats he has a coughing fit and an itchy sensation. He feels his voice is becoming more hoarse as well. Symptoms began 6 months ago but two months ago symptoms have greatly worsened. Denies NV, hematemesis. When he lays down he has post nasal drip, sob and coughing fits. With his KUB Zenkers dx he was placed on Pantoprazole and states he has stopped taking it as it is not helping because he has never felt heartburn. Intermittent pills getting stuck in throat. However, he does state worsening cough and discomfort when lying down. Would not like to take it. Denies unexplained weight loss but he is now afraid to eat because of the worsening odynophagia.  Additionally, patient states that his bowel movements are every 3 days and he has to strain to produce a bowel movement.  His bowel movements are sometimes hard.  He feels bloated and gassy but denies any abdominal pain, however, he  does endorse bilateral groin pain at times of strain.  He has not done anything for his constipation in the past.  He states he does not drink much water intake much fiber. He denies use of NSAIDS or blood thinners. States he has had no cardiac issues since his ablation 7-8 years ago and no longer has needed cards follow up. Denies CP, SOB, sick contacts, fever, chills, hematochezia, melena, rectal pain.    Surgical Hx: Ablation 7-8 years ago  FH: Denies GI related issues and malignancies  SH: Personal Habits:   Smoking: Marijuana 2x week  Alcohol: Socially weekends  Caffeine: 1 cup coffee a day  NSAID: denies    KUB 1/17/25  IMPRESSION:  Zenker's diverticulum as described above. No evidence of hiatal  hernia or gastroesophageal reflux.    Current Outpatient Medications on File Prior to Visit   Medication Sig Dispense Refill   • pantoprazole (ProtoNix) 40 mg EC tablet Take 1 tablet (40 mg) by mouth once daily. (Patient not taking: Reported on 1/23/2025) 30 tablet 3   • [DISCONTINUED] azithromycin (Zithromax) 250 mg tablet Take 2 tablets (500 mg) by mouth once daily for 1 day, THEN 1 tablet (250 mg) once daily for 4 days. 6 tablet 0     No current facility-administered medications on file prior to visit.        Review of Systems   Constitutional:  Negative for appetite change, chills, fever and unexpected weight change.   HENT:  Positive for sore throat, trouble swallowing and voice change. Negative for mouth sores and nosebleeds.    Respiratory:  Negative for cough, choking and shortness of breath.    Cardiovascular:  Negative for chest pain, palpitations and leg swelling.   Gastrointestinal:  Positive for constipation. Negative for abdominal distention, abdominal pain, anal bleeding, blood in stool, diarrhea, nausea, rectal pain and vomiting.   Genitourinary:  Negative for flank pain and hematuria.   Musculoskeletal:  Negative for arthralgias, joint swelling and myalgias.   Skin:  Negative for color change and  rash.   Allergic/Immunologic: Negative for environmental allergies, food allergies and immunocompromised state.   Neurological:  Negative for dizziness, tremors, seizures, syncope, weakness and light-headedness.   Hematological:  Does not bruise/bleed easily.   Psychiatric/Behavioral:  Negative for agitation and confusion. The patient is not nervous/anxious.      Objective   Physical Exam  Vitals reviewed.   Constitutional:       General: He is awake.      Appearance: Normal appearance. He is normal weight.   HENT:      Head: Normocephalic and atraumatic.      Nose: Nose normal.      Mouth/Throat:      Mouth: Mucous membranes are moist.   Eyes:      Pupils: Pupils are equal, round, and reactive to light.   Neck:      Thyroid: No thyroid mass.      Trachea: Phonation normal.   Cardiovascular:      Rate and Rhythm: Normal rate and regular rhythm.      Heart sounds: Normal heart sounds. No murmur heard.     No gallop.   Pulmonary:      Effort: Pulmonary effort is normal. No respiratory distress.      Breath sounds: Normal breath sounds and air entry. No decreased breath sounds, wheezing, rhonchi or rales.   Abdominal:      General: Bowel sounds are normal. There is no distension.      Palpations: Abdomen is soft.      Tenderness: There is no abdominal tenderness.   Musculoskeletal:         General: Normal range of motion.      Cervical back: Normal range of motion and neck supple.      Right lower leg: No edema.      Left lower leg: No edema.   Skin:     General: Skin is warm and dry.      Capillary Refill: Capillary refill takes less than 2 seconds.   Neurological:      General: No focal deficit present.      Mental Status: He is alert and oriented to person, place, and time. Mental status is at baseline.      Cranial Nerves: Cranial nerves 2-12 are intact.      Motor: Motor function is intact.   Psychiatric:         Attention and Perception: Attention and perception normal.         Mood and Affect: Mood normal.        "  Speech: Speech normal.         Behavior: Behavior normal.   BP (!) 149/91   Pulse 56   Temp 36.1 °C (96.9 °F) (Temporal)   Ht 1.829 m (6')   Wt 89.3 kg (196 lb 14.4 oz)   SpO2 98%   BMI 26.70 kg/m²      Lab Results   Component Value Date    WBC 7.7 01/15/2025    HGB 14.5 01/15/2025    HCT 42.0 01/15/2025    MCV 91 01/15/2025     01/15/2025           No lab exists for component: \"LABALBU\"    No results found for: \"AFP\"  No results found for: \"TSH\"      Assessment/Plan     Rosendo Aguilar is a 59-year-old male PMH cardiomyopathy nonischemic, sciatica, HTN, marijuana abuse (twice a week), A-fib with ablation 7-8 years ago (no blood thinners or issues now), osteoarthritis, GERD, elevated bilirubin who presents to GI clinic today for F/U to recent KUB results.  Patient endorses multiple upper GI red flags red flags that need further assessed with EGD.  Additionally, he is due for a colonoscopy as well.  Risks of both procedures explained to the patient and his wife to the full understanding.  I also recommended he start a daily bowel regimen of MiraLAX and Metamucil to help with his constipation as well as encouraged more water intake of at least 64 ounces a day with increased movement.  We discussed the need for him to stand for marijuana especially before procedures and the importance of a good bowel prep clean out.  We discussed why he was placed on pantoprazole after his Zenkers diagnosis and the fact that he may still be having reflux as coughing and discomfort worsen when laying down. Anti-reflux lifestyle discussed. Patient negative for abdominal hernia upon physical assessment. Patient will have his bi-scopes and F/U with me afterwards or sooner if symptoms arise.  Diagnoses and all orders for this visit:  Zenker's diverticulum  -     Referral to Gastroenterology  -     Esophagogastroduodenoscopy (EGD) w Zenker's Diverticulectomy; Future  Encounter for colonoscopy due to history of colonic polyp  - "     Colonoscopy Screening; Average Risk Patient; Future  -     Esophagogastroduodenoscopy (EGD) w Zenker's Diverticulectomy; Future  -     sodium,potassium,mag sulfates (Suprep Bowel Prep Kit) 17.5-3.13-1.6 gram solution; Take 1 bottle by mouth 2 times a day for 1 day. Follow instructions given        Nasima GUERRIER CNP

## 2025-01-24 ENCOUNTER — TELEPHONE (OUTPATIENT)
Dept: PRIMARY CARE | Facility: CLINIC | Age: 60
End: 2025-01-24
Payer: COMMERCIAL

## 2025-01-24 DIAGNOSIS — R13.10 DYSPHAGIA, UNSPECIFIED TYPE: Primary | ICD-10-CM

## 2025-01-24 DIAGNOSIS — K21.9 GERD WITHOUT ESOPHAGITIS: ICD-10-CM

## 2025-01-24 RX ORDER — LANSOPRAZOLE 30 MG/1
30 CAPSULE, DELAYED RELEASE ORAL DAILY
Qty: 30 CAPSULE | Refills: 3 | Status: SHIPPED | OUTPATIENT
Start: 2025-01-24 | End: 2025-05-24

## 2025-01-24 NOTE — TELEPHONE ENCOUNTER
Tariq Dawson MD  [id number] [facility] [address] minutes ago (3:39 PM)       Will make a call and let them know you are having significant symptoms.  Hopefully they can get this done in a month.  If not we will find another GI person to do this for us.  Please let him know and try   Called , he is scheduled January 28 at 1:00 pm for colonoscopy and scope, called patient he is aware and already has prep at pharmacy,

## 2025-01-24 NOTE — TELEPHONE ENCOUNTER
DID A PRIOR AUTHORIZATION FOR PANTOPRAZOLE - RECEIVED A FAX BACK FROM PATIENTS INSURANCE - THEY HAVE DENIED THIS REQUEST FOR THE FOLLOWING REASON:    THIS DRUG IS NOT ON OUR LIST OF COVERED DRUGS.    COVERED DRUGS ARE:    BELTRAN OMEPRAZOLE  BELTRAN ESOMEPRAZOLE  BELTRAN LANSOPRAZOLE  RX LANSOPRAZOLE AND REBEPRAZOLE.    PLEASE ADVISE.

## 2025-01-24 NOTE — TELEPHONE ENCOUNTER
PATIENT AWARE. PATIENT STATES HE DOES NOT TAKE ANYTHING FOR DYSPHAGIA.    PATIENT STATES GI CAN'T GET HIM IN FOR ABOUT 3 MONTHS FOR THE SCOPE AND COLONOSCOPY AT THE SAME TIME. PATIENT WOULD LIKE TO KNOW IF ITS OK TO WAIT THAT LONG WITH THE DISCOMFORT HE HAS.

## 2025-01-24 NOTE — TELEPHONE ENCOUNTER
Tariq Dawson MD  [id number] [place] [id number] Clinical Support Staff1 hour ago (1:41 PM)       Please let him know his insurance will not cover Protonix/pantoprazole.  I sent in something similar called Prevacid that he takes once a day.  Please let him know.

## 2025-01-28 ENCOUNTER — APPOINTMENT (OUTPATIENT)
Dept: GASTROENTEROLOGY | Facility: EXTERNAL LOCATION | Age: 60
End: 2025-01-28
Payer: COMMERCIAL

## 2025-01-28 VITALS
DIASTOLIC BLOOD PRESSURE: 88 MMHG | RESPIRATION RATE: 12 BRPM | HEART RATE: 62 BPM | SYSTOLIC BLOOD PRESSURE: 126 MMHG | TEMPERATURE: 97.9 F | OXYGEN SATURATION: 95 %

## 2025-01-28 DIAGNOSIS — K22.5 ZENKER'S DIVERTICULUM: Primary | ICD-10-CM

## 2025-01-28 DIAGNOSIS — Z86.0100 HISTORY OF COLON POLYPS: Primary | ICD-10-CM

## 2025-01-28 DIAGNOSIS — Z86.0100 ENCOUNTER FOR COLONOSCOPY DUE TO HISTORY OF COLONIC POLYP: ICD-10-CM

## 2025-01-28 DIAGNOSIS — Z12.11 ENCOUNTER FOR COLONOSCOPY DUE TO HISTORY OF COLONIC POLYP: ICD-10-CM

## 2025-01-28 DIAGNOSIS — K22.5 ZENKER'S DIVERTICULUM: ICD-10-CM

## 2025-01-28 PROCEDURE — 45385 COLONOSCOPY W/LESION REMOVAL: CPT | Performed by: STUDENT IN AN ORGANIZED HEALTH CARE EDUCATION/TRAINING PROGRAM

## 2025-01-28 PROCEDURE — 43239 EGD BIOPSY SINGLE/MULTIPLE: CPT | Performed by: STUDENT IN AN ORGANIZED HEALTH CARE EDUCATION/TRAINING PROGRAM

## 2025-01-28 PROCEDURE — 99152 MOD SED SAME PHYS/QHP 5/>YRS: CPT | Performed by: STUDENT IN AN ORGANIZED HEALTH CARE EDUCATION/TRAINING PROGRAM

## 2025-01-28 RX ORDER — MIDAZOLAM HYDROCHLORIDE 1 MG/ML
INJECTION, SOLUTION INTRAMUSCULAR; INTRAVENOUS AS NEEDED
Status: COMPLETED | OUTPATIENT
Start: 2025-01-28 | End: 2025-01-28

## 2025-01-28 RX ORDER — FENTANYL CITRATE 50 UG/ML
INJECTION, SOLUTION INTRAMUSCULAR; INTRAVENOUS AS NEEDED
Status: COMPLETED | OUTPATIENT
Start: 2025-01-28 | End: 2025-01-28

## 2025-01-28 RX ADMIN — MIDAZOLAM HYDROCHLORIDE 2 MG: 1 INJECTION, SOLUTION INTRAMUSCULAR; INTRAVENOUS at 11:41

## 2025-01-28 RX ADMIN — MIDAZOLAM HYDROCHLORIDE 1 MG: 1 INJECTION, SOLUTION INTRAMUSCULAR; INTRAVENOUS at 11:53

## 2025-01-28 RX ADMIN — FENTANYL CITRATE 25 MCG: 50 INJECTION, SOLUTION INTRAMUSCULAR; INTRAVENOUS at 11:44

## 2025-01-28 RX ADMIN — MIDAZOLAM HYDROCHLORIDE 1 MG: 1 INJECTION, SOLUTION INTRAMUSCULAR; INTRAVENOUS at 11:58

## 2025-01-28 RX ADMIN — FENTANYL CITRATE 25 MCG: 50 INJECTION, SOLUTION INTRAMUSCULAR; INTRAVENOUS at 11:56

## 2025-01-28 RX ADMIN — FENTANYL CITRATE 50 MCG: 50 INJECTION, SOLUTION INTRAMUSCULAR; INTRAVENOUS at 11:41

## 2025-01-28 RX ADMIN — MIDAZOLAM HYDROCHLORIDE 1 MG: 1 INJECTION, SOLUTION INTRAMUSCULAR; INTRAVENOUS at 11:44

## 2025-01-28 RX ADMIN — FENTANYL CITRATE 25 MCG: 50 INJECTION, SOLUTION INTRAMUSCULAR; INTRAVENOUS at 11:53

## 2025-01-28 RX ADMIN — MIDAZOLAM HYDROCHLORIDE 1 MG: 1 INJECTION, SOLUTION INTRAMUSCULAR; INTRAVENOUS at 11:56

## 2025-01-28 ASSESSMENT — PAIN SCALES - GENERAL
PAINLEVEL_OUTOF10: 0 - NO PAIN

## 2025-01-28 ASSESSMENT — PAIN - FUNCTIONAL ASSESSMENT
PAIN_FUNCTIONAL_ASSESSMENT: 0-10

## 2025-01-28 NOTE — DISCHARGE INSTRUCTIONS

## 2025-01-31 ENCOUNTER — OFFICE VISIT (OUTPATIENT)
Dept: URGENT CARE | Age: 60
End: 2025-01-31
Payer: COMMERCIAL

## 2025-01-31 VITALS
HEART RATE: 75 BPM | DIASTOLIC BLOOD PRESSURE: 88 MMHG | OXYGEN SATURATION: 97 % | RESPIRATION RATE: 18 BRPM | BODY MASS INDEX: 25.73 KG/M2 | TEMPERATURE: 97.5 F | SYSTOLIC BLOOD PRESSURE: 140 MMHG | WEIGHT: 190 LBS | HEIGHT: 72 IN

## 2025-01-31 DIAGNOSIS — S69.91XA HAND INJURY, RIGHT, INITIAL ENCOUNTER: ICD-10-CM

## 2025-01-31 DIAGNOSIS — L03.113 CELLULITIS OF RIGHT HAND: Primary | ICD-10-CM

## 2025-01-31 RX ORDER — CLINDAMYCIN HYDROCHLORIDE 300 MG/1
300 CAPSULE ORAL 3 TIMES DAILY
Qty: 30 CAPSULE | Refills: 0 | Status: SHIPPED | OUTPATIENT
Start: 2025-01-31 | End: 2025-02-10

## 2025-01-31 ASSESSMENT — ENCOUNTER SYMPTOMS
RESPIRATORY NEGATIVE: 1
PSYCHIATRIC NEGATIVE: 1
NEUROLOGICAL NEGATIVE: 1
ENDOCRINE NEGATIVE: 1
EYES NEGATIVE: 1
HEMATOLOGIC/LYMPHATIC NEGATIVE: 1
JOINT SWELLING: 1
CARDIOVASCULAR NEGATIVE: 1
ALLERGIC/IMMUNOLOGIC NEGATIVE: 1
CONSTITUTIONAL NEGATIVE: 1
GASTROINTESTINAL NEGATIVE: 1

## 2025-01-31 NOTE — PROGRESS NOTES
Subjective   Patient ID: Rosendo Delacruz is a 59 y.o. male. They present today with a chief complaint of Injury (Injury to R hand puncture wound /Red swelling pain ).    History of Present Illness    Injury  Location:  Right hand swelling, hit a metal plate at work  Severity:  Moderate  Onset quality:  Gradual  Timing:  Intermittent  Progression:  Worsening  Chronicity:  New      Past Medical History  Allergies as of 01/31/2025    (No Known Allergies)       (Not in a hospital admission)       Past Medical History:   Diagnosis Date    Personal history of other diseases of the musculoskeletal system and connective tissue 07/29/2020    History of tendinitis    Personal history of other infectious and parasitic diseases 07/29/2020    History of dermatophytosis    Strain of unspecified muscle(s) and tendon(s) at lower leg level, right leg, initial encounter 09/02/2020    Strain of right knee, initial encounter       Past Surgical History:   Procedure Laterality Date    OTHER SURGICAL HISTORY  07/29/2020    Inguinal hernia repair    OTHER SURGICAL HISTORY  07/29/2020    Heart surgery    OTHER SURGICAL HISTORY  07/29/2020    Tonsillectomy    OTHER SURGICAL HISTORY  09/02/2020    Colonoscopy        reports that he has never smoked. He has never used smokeless tobacco. He reports current alcohol use. He reports current drug use. Drug: Marijuana.    Review of Systems  Review of Systems   Constitutional: Negative.    HENT: Negative.     Eyes: Negative.    Respiratory: Negative.     Cardiovascular: Negative.    Gastrointestinal: Negative.    Endocrine: Negative.    Genitourinary: Negative.    Musculoskeletal:  Positive for joint swelling.   Allergic/Immunologic: Negative.    Neurological: Negative.    Hematological: Negative.    Psychiatric/Behavioral: Negative.     All other systems reviewed and are negative.                                 Objective    Vitals:    01/31/25 1025 01/31/25 1058   BP: (!) 160/103 140/88   Pulse:  75    Resp: 18    Temp: 36.4 °C (97.5 °F)    TempSrc: Oral    SpO2: 97%    Weight: 86.2 kg (190 lb)    Height: 1.829 m (6')      No LMP for male patient.    Physical Exam  Vitals and nursing note reviewed.   Constitutional:       Appearance: Normal appearance. He is normal weight.   Cardiovascular:      Rate and Rhythm: Normal rate and regular rhythm.      Pulses: Normal pulses.      Heart sounds: Normal heart sounds.   Pulmonary:      Effort: Pulmonary effort is normal.      Breath sounds: Normal breath sounds.   Abdominal:      General: Bowel sounds are normal.      Palpations: Abdomen is soft.   Musculoskeletal:         General: Swelling and tenderness present.      Right hand: Swelling and tenderness present.        Arms:       Comments: 2nd MCP with redness tenderness and swelling   Skin:     General: Skin is warm and dry.   Neurological:      General: No focal deficit present.      Mental Status: He is alert and oriented to person, place, and time. Mental status is at baseline.   Psychiatric:         Mood and Affect: Mood normal.         Behavior: Behavior normal.         Thought Content: Thought content normal.         Judgment: Judgment normal.         Procedures    Point of Care Test & Imaging Results from this visit  No results found for this visit on 01/31/25.   No results found.    Diagnostic study results (if any) were reviewed by CHAYA Proctor.    Assessment/Plan   Allergies, medications, history, and pertinent labs/EKGs/Imaging reviewed by CHAYA Proctor.     Medical Decision Making  Medical Decision Making  At time of discharge patient was clinically well-appearing and HDS for outpatient management. The patient and/or family was educated regarding diagnosis, supportive care, OTC and Rx medications. The patient and/or family was given the opportunity to ask questions prior to discharge.  They verbalized understanding of my discussion of the plans for treatment, expected  course, indications to return to UC or seek further evaluation in ED, and the need for timely follow up as directed.   They were provided with a work/school excuse if requested.        Orders and Diagnoses  Diagnoses and all orders for this visit:  Cellulitis of right hand  -     XR hand right 3+ views  -     clindamycin (Cleocin HCL) 300 mg capsule; Take 1 capsule (300 mg) by mouth 3 times a day for 10 days.  Hand injury, right, initial encounter  -     XR hand right 3+ views  -     clindamycin (Cleocin HCL) 300 mg capsule; Take 1 capsule (300 mg) by mouth 3 times a day for 10 days.    Return to Urgent care if symptoms return or progress  Follow up with PCP in 1-2 weeks   Return to Urgent care if symptoms return or progress  Follow up with PCP in 1-2 weeks     Patient disposition: Home    Electronically signed by CHAYA Proctor  10:59 AM

## 2025-02-04 ENCOUNTER — TELEPHONE (OUTPATIENT)
Dept: PRIMARY CARE | Facility: CLINIC | Age: 60
End: 2025-02-04
Payer: COMMERCIAL

## 2025-02-04 NOTE — TELEPHONE ENCOUNTER
----- Message from Tariq Dawson sent at 2/4/2025  8:04 AM EST -----  Please give him a call and let him know I received a report from the GI doctor about his colonoscopy and endoscopy.  Please make sure he was referred to specialty GI for his Zenker's diverticulum.  Thanks

## 2025-02-05 ENCOUNTER — APPOINTMENT (OUTPATIENT)
Dept: PRIMARY CARE | Facility: CLINIC | Age: 60
End: 2025-02-05
Payer: COMMERCIAL

## 2025-02-05 ENCOUNTER — ANESTHESIA (OUTPATIENT)
Dept: GASTROENTEROLOGY | Facility: HOSPITAL | Age: 60
End: 2025-02-05
Payer: COMMERCIAL

## 2025-02-05 ENCOUNTER — ANESTHESIA EVENT (OUTPATIENT)
Dept: GASTROENTEROLOGY | Facility: HOSPITAL | Age: 60
End: 2025-02-05
Payer: COMMERCIAL

## 2025-02-05 ENCOUNTER — HOSPITAL ENCOUNTER (OUTPATIENT)
Dept: GASTROENTEROLOGY | Facility: HOSPITAL | Age: 60
Discharge: HOME | End: 2025-02-05
Payer: COMMERCIAL

## 2025-02-05 VITALS
DIASTOLIC BLOOD PRESSURE: 88 MMHG | WEIGHT: 195 LBS | HEIGHT: 72 IN | HEART RATE: 56 BPM | BODY MASS INDEX: 26.41 KG/M2 | TEMPERATURE: 96.8 F | RESPIRATION RATE: 16 BRPM | SYSTOLIC BLOOD PRESSURE: 158 MMHG | OXYGEN SATURATION: 97 %

## 2025-02-05 DIAGNOSIS — K22.5 ZENKER'S DIVERTICULUM: Primary | ICD-10-CM

## 2025-02-05 PROCEDURE — 43235 EGD DIAGNOSTIC BRUSH WASH: CPT | Performed by: INTERNAL MEDICINE

## 2025-02-05 PROCEDURE — A43235 PR ESOPHAGOGASTRODUODENOSCOPY TRANSORAL DIAGNOSTIC: Performed by: NURSE ANESTHETIST, CERTIFIED REGISTERED

## 2025-02-05 PROCEDURE — 3700000002 HC GENERAL ANESTHESIA TIME - EACH INCREMENTAL 1 MINUTE

## 2025-02-05 PROCEDURE — A43235 PR ESOPHAGOGASTRODUODENOSCOPY TRANSORAL DIAGNOSTIC: Performed by: ANESTHESIOLOGY

## 2025-02-05 PROCEDURE — 2500000004 HC RX 250 GENERAL PHARMACY W/ HCPCS (ALT 636 FOR OP/ED): Performed by: NURSE ANESTHETIST, CERTIFIED REGISTERED

## 2025-02-05 PROCEDURE — 3700000001 HC GENERAL ANESTHESIA TIME - INITIAL BASE CHARGE

## 2025-02-05 PROCEDURE — 7100000010 HC PHASE TWO TIME - EACH INCREMENTAL 1 MINUTE

## 2025-02-05 PROCEDURE — 7100000009 HC PHASE TWO TIME - INITIAL BASE CHARGE

## 2025-02-05 RX ORDER — FENTANYL CITRATE 50 UG/ML
12.5 INJECTION, SOLUTION INTRAMUSCULAR; INTRAVENOUS EVERY 5 MIN PRN
OUTPATIENT
Start: 2025-02-05

## 2025-02-05 RX ORDER — ACETAMINOPHEN 325 MG/1
975 TABLET ORAL ONCE
OUTPATIENT
Start: 2025-02-05 | End: 2025-02-05

## 2025-02-05 RX ORDER — SODIUM CHLORIDE, SODIUM LACTATE, POTASSIUM CHLORIDE, CALCIUM CHLORIDE 600; 310; 30; 20 MG/100ML; MG/100ML; MG/100ML; MG/100ML
INJECTION, SOLUTION INTRAVENOUS CONTINUOUS PRN
Status: DISCONTINUED | OUTPATIENT
Start: 2025-02-05 | End: 2025-02-05

## 2025-02-05 RX ORDER — HYDRALAZINE HYDROCHLORIDE 20 MG/ML
5 INJECTION INTRAMUSCULAR; INTRAVENOUS EVERY 30 MIN PRN
OUTPATIENT
Start: 2025-02-05

## 2025-02-05 RX ORDER — LIDOCAINE HYDROCHLORIDE 20 MG/ML
INJECTION, SOLUTION EPIDURAL; INFILTRATION; INTRACAUDAL; PERINEURAL AS NEEDED
Status: DISCONTINUED | OUTPATIENT
Start: 2025-02-05 | End: 2025-02-05

## 2025-02-05 RX ORDER — MIDAZOLAM HYDROCHLORIDE 1 MG/ML
1 INJECTION, SOLUTION INTRAMUSCULAR; INTRAVENOUS ONCE AS NEEDED
OUTPATIENT
Start: 2025-02-05

## 2025-02-05 RX ORDER — GLYCOPYRROLATE 0.2 MG/ML
INJECTION INTRAMUSCULAR; INTRAVENOUS AS NEEDED
Status: DISCONTINUED | OUTPATIENT
Start: 2025-02-05 | End: 2025-02-05

## 2025-02-05 RX ORDER — SODIUM CHLORIDE, SODIUM LACTATE, POTASSIUM CHLORIDE, CALCIUM CHLORIDE 600; 310; 30; 20 MG/100ML; MG/100ML; MG/100ML; MG/100ML
100 INJECTION, SOLUTION INTRAVENOUS CONTINUOUS
OUTPATIENT
Start: 2025-02-05 | End: 2025-02-06

## 2025-02-05 RX ORDER — HYDROMORPHONE HYDROCHLORIDE 1 MG/ML
1 INJECTION, SOLUTION INTRAMUSCULAR; INTRAVENOUS; SUBCUTANEOUS EVERY 5 MIN PRN
OUTPATIENT
Start: 2025-02-05

## 2025-02-05 RX ORDER — MEPERIDINE HYDROCHLORIDE 50 MG/ML
12.5 INJECTION INTRAMUSCULAR; INTRAVENOUS; SUBCUTANEOUS EVERY 10 MIN PRN
OUTPATIENT
Start: 2025-02-05

## 2025-02-05 RX ORDER — OXYCODONE HYDROCHLORIDE 5 MG/1
5 TABLET ORAL EVERY 4 HOURS PRN
OUTPATIENT
Start: 2025-02-05

## 2025-02-05 RX ORDER — LIDOCAINE HYDROCHLORIDE 10 MG/ML
0.1 INJECTION, SOLUTION INFILTRATION; PERINEURAL ONCE
OUTPATIENT
Start: 2025-02-05 | End: 2025-02-05

## 2025-02-05 RX ORDER — DIPHENHYDRAMINE HYDROCHLORIDE 50 MG/ML
12.5 INJECTION INTRAMUSCULAR; INTRAVENOUS ONCE AS NEEDED
OUTPATIENT
Start: 2025-02-05

## 2025-02-05 RX ORDER — PROPOFOL 10 MG/ML
INJECTION, EMULSION INTRAVENOUS CONTINUOUS PRN
Status: DISCONTINUED | OUTPATIENT
Start: 2025-02-05 | End: 2025-02-05

## 2025-02-05 RX ORDER — ALBUTEROL SULFATE 0.83 MG/ML
2.5 SOLUTION RESPIRATORY (INHALATION) ONCE AS NEEDED
OUTPATIENT
Start: 2025-02-05

## 2025-02-05 RX ORDER — ONDANSETRON HYDROCHLORIDE 2 MG/ML
4 INJECTION, SOLUTION INTRAVENOUS ONCE AS NEEDED
OUTPATIENT
Start: 2025-02-05

## 2025-02-05 RX ADMIN — LIDOCAINE HYDROCHLORIDE 40 MG: 20 INJECTION, SOLUTION EPIDURAL; INFILTRATION; INTRACAUDAL; PERINEURAL at 09:32

## 2025-02-05 RX ADMIN — PROPOFOL 20 MG: 10 INJECTION, EMULSION INTRAVENOUS at 09:38

## 2025-02-05 RX ADMIN — GLYCOPYRROLATE 0.1 MG: 0.2 INJECTION INTRAMUSCULAR; INTRAVENOUS at 09:24

## 2025-02-05 RX ADMIN — PROPOFOL 50 MG: 10 INJECTION, EMULSION INTRAVENOUS at 09:33

## 2025-02-05 RX ADMIN — LIDOCAINE HYDROCHLORIDE 60 MG: 20 INJECTION, SOLUTION EPIDURAL; INFILTRATION; INTRACAUDAL; PERINEURAL at 09:38

## 2025-02-05 RX ADMIN — GLYCOPYRROLATE 0.1 MG: 0.2 INJECTION INTRAMUSCULAR; INTRAVENOUS at 09:40

## 2025-02-05 RX ADMIN — LIDOCAINE HYDROCHLORIDE 60 MG: 20 INJECTION, SOLUTION EPIDURAL; INFILTRATION; INTRACAUDAL; PERINEURAL at 09:34

## 2025-02-05 RX ADMIN — PROPOFOL 150 MCG/KG/MIN: 10 INJECTION, EMULSION INTRAVENOUS at 09:32

## 2025-02-05 RX ADMIN — SODIUM CHLORIDE, POTASSIUM CHLORIDE, SODIUM LACTATE AND CALCIUM CHLORIDE: 600; 310; 30; 20 INJECTION, SOLUTION INTRAVENOUS at 09:21

## 2025-02-05 RX ADMIN — PROPOFOL 30 MG: 10 INJECTION, EMULSION INTRAVENOUS at 09:34

## 2025-02-05 ASSESSMENT — PAIN SCALES - GENERAL
PAINLEVEL_OUTOF10: 0 - NO PAIN

## 2025-02-05 ASSESSMENT — COLUMBIA-SUICIDE SEVERITY RATING SCALE - C-SSRS
1. IN THE PAST MONTH, HAVE YOU WISHED YOU WERE DEAD OR WISHED YOU COULD GO TO SLEEP AND NOT WAKE UP?: NO
2. HAVE YOU ACTUALLY HAD ANY THOUGHTS OF KILLING YOURSELF?: NO
6. HAVE YOU EVER DONE ANYTHING, STARTED TO DO ANYTHING, OR PREPARED TO DO ANYTHING TO END YOUR LIFE?: NO

## 2025-02-05 ASSESSMENT — PAIN - FUNCTIONAL ASSESSMENT
PAIN_FUNCTIONAL_ASSESSMENT: 0-10
PAIN_FUNCTIONAL_ASSESSMENT: 0-10

## 2025-02-05 NOTE — ANESTHESIA POSTPROCEDURE EVALUATION
Patient: Rosendo Delacruz    Procedure Summary       Date: 02/05/25 Room / [facility]: [facility] [place]    Anesthesia Start: 0920 Anesthesia Stop: 0954    Procedure: EGD Diagnosis: Zenker's diverticulum    Scheduled Providers: James Andrews MD Responsible Provider: Neftali Bhatti DO    Anesthesia Type: MAC ASA Status: 3            Anesthesia Type: MAC    BP (!) 164/92   Pulse 57   Temp 36 °C (96.8 °F) (Temporal)   Resp 18   Ht 1.829 m (6')   Wt 88.5 kg (195 lb)   SpO2 100%   BMI 26.45 kg/m²    Anesthesia Post Evaluation    Patient location during evaluation: bedside  Patient participation: complete - patient participated  Level of consciousness: awake and alert  Pain management: adequate  Airway patency: patent  Cardiovascular status: acceptable  Respiratory status: acceptable and room air  Hydration status: acceptable  Postoperative Nausea and Vomiting: none        There were no known notable events for this encounter.

## 2025-02-05 NOTE — DISCHARGE INSTRUCTIONS

## 2025-02-05 NOTE — ANESTHESIA PREPROCEDURE EVALUATION
Patient: Rosendo Delacruz    Procedure Information       Date/Time: 02/05/25 0108    Scheduled providers: James Andrews MD    Procedure: EGD    [place] [facility] [place]vant Problems   Cardiac   (+) Chest pain   (+) HTN (hypertension)   (+) Persistent atrial fibrillation (Multi)      Musculoskeletal   (+) Chronic bilateral low back pain without sciatica   (+) Generalized osteoarthritis       Clinical information reviewed:                   NPO Detail:  No data recorded     Physical Exam    Airway  Mallampati: II  TM distance: >3 FB  Neck ROM: full     Cardiovascular - normal exam     Dental    Pulmonary - normal exam     Abdominal - normal exam             Anesthesia Plan    History of general anesthesia?: yes  History of complications of general anesthesia?: no    ASA 3     MAC     intravenous induction   Anesthetic plan and risks discussed with patient.    Plan discussed with CRNA.

## 2025-02-10 LAB
LABORATORY COMMENT REPORT: NORMAL
PATH REPORT.FINAL DX SPEC: NORMAL
PATH REPORT.GROSS SPEC: NORMAL
PATH REPORT.TOTAL CANCER: NORMAL

## 2025-02-18 ENCOUNTER — TELEPHONE (OUTPATIENT)
Dept: PRIMARY CARE | Facility: CLINIC | Age: 60
End: 2025-02-18
Payer: COMMERCIAL

## 2025-02-18 NOTE — TELEPHONE ENCOUNTER
PLEASE ADVISE    Rosendo OLIVER [id number] [place] [id number] Clinical Support Staff (supporting Tariq Dawson MD)1 hour ago (3:12 PM)     ROLANDO Archuleta, Im asking on behalf of my  Yasmani Delacruz  because he doesn't like to get things done, lol.     He is in much discomfort all the time and his tentative surgery is not until April sometime.  IM not convinced the surgeon  has talked with his team to schedule it, so it's just left at April. He had one procedure done to find the issue, but the DrTeresita Didn't communicate  with Dr. AVILA OF DETAILS SO Dr. AVILA DID THE EXACT SAME PROCEDURE 2 WEEKS AGO.  Dr. Avila said he'd get with his team and schedule  it.      Is there anyway  you guys can look into this, Yasmani can't eat, drink or sleep with out coughing or choking on something.     Thank you

## 2025-03-03 ENCOUNTER — TELEPHONE (OUTPATIENT)
Dept: PRIMARY CARE | Facility: CLINIC | Age: 60
End: 2025-03-03
Payer: COMMERCIAL

## 2025-03-03 NOTE — TELEPHONE ENCOUNTER
PLEASE ADVISE    Rosendo Delacruz  [id number] [place] [id number] Clinical Support Staff (supporting Tariq Dawson MD)13 days ago     ROLANDO Archuleta, Im asking on behalf of my  Yasmani Delacruz  because he doesn't like to get things done, lol.     He is in much discomfort all the time and his tentative surgery is not until April sometime.  IM not convinced the surgeon  has talked with his team to schedule it, so it's just left at April. He had one procedure done to find the issue, but the  Didn't communicate  with Dr. AVILA OF DETAILS SO Dr. AVILA DID THE EXACT SAME PROCEDURE 2 WEEKS AGO.  Dr. Avila said he'd get with his team and schedule  it.      Is there anyway  you guys can look into this, Yasmani can't eat, drink or sleep with out coughing or choking on something.     Thank you

## 2025-03-04 DIAGNOSIS — K22.5 ZENKER DIVERTICULA: Primary | ICD-10-CM

## 2025-03-18 DIAGNOSIS — R13.12 OROPHARYNGEAL DYSPHAGIA: Primary | ICD-10-CM

## 2025-04-01 ENCOUNTER — HOSPITAL ENCOUNTER (OUTPATIENT)
Dept: RADIOLOGY | Facility: HOSPITAL | Age: 60
Discharge: HOME | End: 2025-04-01
Payer: COMMERCIAL

## 2025-04-01 ENCOUNTER — OFFICE VISIT (OUTPATIENT)
Dept: OTOLARYNGOLOGY | Facility: CLINIC | Age: 60
End: 2025-04-01
Payer: COMMERCIAL

## 2025-04-01 ENCOUNTER — LAB (OUTPATIENT)
Dept: LAB | Facility: CLINIC | Age: 60
End: 2025-04-01
Payer: COMMERCIAL

## 2025-04-01 VITALS — WEIGHT: 196.6 LBS | HEIGHT: 72 IN | BODY MASS INDEX: 26.63 KG/M2 | TEMPERATURE: 97.5 F

## 2025-04-01 DIAGNOSIS — R13.12 OROPHARYNGEAL DYSPHAGIA: ICD-10-CM

## 2025-04-01 DIAGNOSIS — K22.5 ZENKER'S DIVERTICULUM: Primary | ICD-10-CM

## 2025-04-01 DIAGNOSIS — R13.19 OTHER DYSPHAGIA: ICD-10-CM

## 2025-04-01 LAB
ANION GAP SERPL CALC-SCNC: 10 MMOL/L (ref 10–20)
BASOPHILS # BLD AUTO: 0.03 X10*3/UL (ref 0–0.1)
BASOPHILS NFR BLD AUTO: 0.5 %
BUN SERPL-MCNC: 16 MG/DL (ref 6–23)
CALCIUM SERPL-MCNC: 10 MG/DL (ref 8.6–10.3)
CHLORIDE SERPL-SCNC: 105 MMOL/L (ref 98–107)
CO2 SERPL-SCNC: 28 MMOL/L (ref 21–32)
CREAT SERPL-MCNC: 0.82 MG/DL (ref 0.5–1.3)
EGFRCR SERPLBLD CKD-EPI 2021: >90 ML/MIN/1.73M*2
EOSINOPHIL # BLD AUTO: 0.11 X10*3/UL (ref 0–0.7)
EOSINOPHIL NFR BLD AUTO: 1.9 %
ERYTHROCYTE [DISTWIDTH] IN BLOOD BY AUTOMATED COUNT: 12.6 % (ref 11.5–14.5)
GLUCOSE SERPL-MCNC: 95 MG/DL (ref 74–99)
HCT VFR BLD AUTO: 43.6 % (ref 41–52)
HGB BLD-MCNC: 14.9 G/DL (ref 13.5–17.5)
IMM GRANULOCYTES # BLD AUTO: 0 X10*3/UL (ref 0–0.7)
IMM GRANULOCYTES NFR BLD AUTO: 0 % (ref 0–0.9)
LYMPHOCYTES # BLD AUTO: 1.64 X10*3/UL (ref 1.2–4.8)
LYMPHOCYTES NFR BLD AUTO: 29 %
MCH RBC QN AUTO: 30.8 PG (ref 26–34)
MCHC RBC AUTO-ENTMCNC: 34.2 G/DL (ref 32–36)
MCV RBC AUTO: 90 FL (ref 80–100)
MONOCYTES # BLD AUTO: 0.45 X10*3/UL (ref 0.1–1)
MONOCYTES NFR BLD AUTO: 8 %
NEUTROPHILS # BLD AUTO: 3.42 X10*3/UL (ref 1.2–7.7)
NEUTROPHILS NFR BLD AUTO: 60.6 %
NRBC BLD-RTO: NORMAL /100{WBCS}
PLATELET # BLD AUTO: 241 X10*3/UL (ref 150–450)
POTASSIUM SERPL-SCNC: 3.9 MMOL/L (ref 3.5–5.3)
RBC # BLD AUTO: 4.83 X10*6/UL (ref 4.5–5.9)
RBC MORPH BLD: NORMAL
SODIUM SERPL-SCNC: 139 MMOL/L (ref 136–145)
WBC # BLD AUTO: 5.7 X10*3/UL (ref 4.4–11.3)

## 2025-04-01 PROCEDURE — 99215 OFFICE O/P EST HI 40 MIN: CPT | Mod: 25 | Performed by: OTOLARYNGOLOGY

## 2025-04-01 PROCEDURE — 99205 OFFICE O/P NEW HI 60 MIN: CPT | Performed by: OTOLARYNGOLOGY

## 2025-04-01 PROCEDURE — 80048 BASIC METABOLIC PNL TOTAL CA: CPT

## 2025-04-01 PROCEDURE — 31575 DIAGNOSTIC LARYNGOSCOPY: CPT | Performed by: OTOLARYNGOLOGY

## 2025-04-01 PROCEDURE — 1036F TOBACCO NON-USER: CPT | Performed by: OTOLARYNGOLOGY

## 2025-04-01 PROCEDURE — 92611 MOTION FLUOROSCOPY/SWALLOW: CPT | Mod: GN | Performed by: STUDENT IN AN ORGANIZED HEALTH CARE EDUCATION/TRAINING PROGRAM

## 2025-04-01 PROCEDURE — 36415 COLL VENOUS BLD VENIPUNCTURE: CPT

## 2025-04-01 PROCEDURE — 2500000005 HC RX 250 GENERAL PHARMACY W/O HCPCS: Performed by: INTERNAL MEDICINE

## 2025-04-01 PROCEDURE — 3008F BODY MASS INDEX DOCD: CPT | Performed by: OTOLARYNGOLOGY

## 2025-04-01 PROCEDURE — 74230 X-RAY XM SWLNG FUNCJ C+: CPT

## 2025-04-01 PROCEDURE — 85025 COMPLETE CBC W/AUTO DIFF WBC: CPT

## 2025-04-01 PROCEDURE — 74230 X-RAY XM SWLNG FUNCJ C+: CPT | Performed by: STUDENT IN AN ORGANIZED HEALTH CARE EDUCATION/TRAINING PROGRAM

## 2025-04-01 RX ORDER — SODIUM CHLORIDE, SODIUM LACTATE, POTASSIUM CHLORIDE, CALCIUM CHLORIDE 600; 310; 30; 20 MG/100ML; MG/100ML; MG/100ML; MG/100ML
20 INJECTION, SOLUTION INTRAVENOUS CONTINUOUS
OUTPATIENT
Start: 2025-05-22 | End: 2025-05-22

## 2025-04-01 RX ADMIN — BARIUM SULFATE 700 MG: 700 TABLET ORAL at 09:38

## 2025-04-01 RX ADMIN — BARIUM SULFATE 90 ML: 0.81 POWDER, FOR SUSPENSION ORAL at 09:42

## 2025-04-01 RX ADMIN — BARIUM SULFATE 50 ML: 400 SUSPENSION ORAL at 09:39

## 2025-04-01 RX ADMIN — BARIUM SULFATE 18 ML: 400 PASTE ORAL at 09:40

## 2025-04-01 ASSESSMENT — PAIN SCALES - GENERAL: PAINLEVEL_OUTOF10: 0-NO PAIN

## 2025-04-01 NOTE — PROGRESS NOTES
Patient: Rosendo Delacruz   MRN: [id number] YOB: 1965   Sex: male Age: 59 y.o.  Date of Service: 2025       ASSESSMENT AND PLAN  I discussed the findings with Rosendo Delacruz and have recommended the followin. Dysphagia in setting of Zenker's diverticulum. Prior EGDs without significant esophageal issues. We had a long discussion about the potential treatment options.  - Schedule for endoscopic laser Zenker's diverticulectomy, possible flexible esophagoscopy dilation, Botox. Risks, benefits, and alternatives discussed with the patient, including but not limited to bleeding, infection, pain, dental damage, tongue swelling, need for repeat procedure, no improvement in symptoms, esophageal perforation, need for feeding tube, hospitalization, death. The patient expressed understanding and agrees to proceed.    - Preop labs, EKG, CXR  - PAT ordered      CHIEF COMPLAINT  Chief Complaint   Patient presents with    Dysphagia       HISTORY OF PRESENT ILLNESS  Rosendo Delacruz is a 59 y.o. male referred by James Alfaro MD for evaluation of dysphagia and Zenker's diverticulum.  The patient has a history of cardiomyopathy nonischemic, sciatica, HTN, marijuana abuse (twice a week), A-fib with ablation 7-8 years ago (no blood thinners or issues now), osteoarthritis, elevated bilirubin.      25  The dysphagia history includes:      Dysphagia for solids               yes    Dysphagia for liquids              yes    Dysphagia for pills                  yes  Associated weight loss            yes, 10-15 lbs because he is not eating as much   Recent pneumonia/bronchitis  no  GERD/Acid reflux   No, no on medication    He reports it feels like something is stuck in his throat constantly and he has to gulp to push fluids and food down. After pt eats he does bring up undigested food. He feels his voice is becoming more hoarse as well. Symptoms began 12 months ago but 6 months ago symptoms have greatly  worsened. Due to work schedules, he eats within an hour of going to bed and gets a lot of regurgitation. Some trouble breathing at night. Denies NV, hematemesis.     SH: occasional MJ, no history of cigarettes, he works as a   Meds: no blood thinners    ADDITIONAL HISTORY  Past Medical History  He has a past medical history of Personal history of other diseases of the musculoskeletal system and connective tissue (07/29/2020), Personal history of other infectious and parasitic diseases (07/29/2020), and Strain of unspecified muscle(s) and tendon(s) at lower leg level, right leg, initial encounter (09/02/2020). Surgical History  He has a past surgical history that includes Other surgical history (07/29/2020); Other surgical history (07/29/2020); Other surgical history (07/29/2020); and Other surgical history (09/02/2020).   Social History  He reports that he has never smoked. He has never used smokeless tobacco. He reports current alcohol use. He reports current drug use. Drug: Marijuana. Allergies  Patient has no known allergies.     Family History  No family history on file.     REVIEW OF SYSTEMS  All 10 systems were reviewed and negative except for above.      PHYSICAL EXAM  ENT Physical Exam   GENERAL: Well-nourished and developed, alert and appropriate, no distress, voice [id number]  RESPIRATORY: Breathing quietly, no stridor  HEAD: Normocephalic atraumatic  FACE: Symmetric, no masses or lesions  EYES:  Pupils reactive, sclera clear, external ocular muscles intact, no nystagmus.    EARS:  Pinnae normal. External auditory canals clear and tympanic membranes intact.  NOSE:  No anterior lesions, masses or polyps.  ORAL CAVITY/OROPHARYNX:  Buccal mucosa is moist without lesions or masses, tongue midline and palate elevates symmetrically. Tongue mobility intact. No belen, 3 fingers thyromental distance  NECK:  Soft. There is no lymphadenopathy or thyromegaly.  Full ROM  NEUROLOGIC:  Cranial nerves II-XII  grossly intact.       Last Recorded Vitals  Temperature 36.4 °C (97.5 °F), height 1.829 m (6'), weight 89.2 kg (196 lb 9.6 oz).    RESULTS    Patient Reported Outcome Measures  N/A    Laboratory, Radiology, and Pathology  I personally reviewed the following results, with the following interpretation:   MBS 4/1/25 - hypopharyngeal diverticulum most consistent with a Zenker's      EGD 2/5/25  Zenker's diverticulosis containing food and causing moderate luminal narrowing in the cricopharynx  The upper third of the esophagus, middle third of the esophagus, lower third of the esophagus and GE junction appeared normal.  The cardia, fundus of the stomach, body of the stomach and antrum appeared normal.  The duodenal bulb and 2nd part of the duodenum appeared normal.    Path 1/28/25  B. ESOPHAGUS DISTAL, BIOPSY:               -Squamous mucosa with no significant histopathologic findings.              -No microscopic evidence of eosinophilic esophagitis.     C. ESOPHAGUS PROXIMAL, BIOPSY:               -Squamous mucosa with no significant histopathologic findings.              -No microscopic evidence of eosinophilic esophagitis.    PROCEDURES  Flexible Fiberoptic Laryngoscopy     Patient failed a mirror exam due to limitations of equipment and the need for laryngoscopy to assess laryngeal anatomy and function    PREOPERATIVE DIAGNOSIS: dysphagia    POSTOPERATIVE DIAGNOSIS: Same    PROCEDURE: Transnasal videolaryngoscopy    ANESTHESIA:  Topical    COMPLICATIONS:  None    SPECIMENS:  None    PROCEDURE IN DETAIL:  The patient was brought into the endoscopy suite, placed in the upright position.  The nasal cavity was topically decongested anesthetized.  The distal chip video laryngoscope was passed through the nasal cavity.  The nasal cavity and nasopharynx were within normal limits except noted below. The following findings on laryngoscopy were noted:     Tongue Base: no masses or lesions   Vocal Fold Mobility                Right VF: mobile               Left VF: mobile   TVF Appearance               Edema/Erythema: none               Lesions/vibratory margin irregularities: mild atrophy   Muscle Tension Patterns:  lateral   Other Findings: pooled frothy secretions R>L postcricoid, clears with swallow then returns    The patient tolerated the procedure well.        ----------------------------------------------------------------------  Marcelle White MD, MAEd    [facility]  Department of Otolaryngology - Head and Neck Surgery  [facility]    The total time I spent in care of this patient today (excluding time spent on other billable services) is as follows:    Time Spent  Prep time on day of patient encounter: 15 minutes  Time spent directly with patient, family or caregiver: 30 minutes  Additional Time Spent on Patient Care Activities: 5 minutes  Documentation Time: 10 minutes  Other Time Spent: 0 minutes  Total: 60 minutes

## 2025-04-01 NOTE — PROCEDURES
Outpatient Modified Barium Swallow Study     Patient Name: Rosendo Delacruz  MRN: [id number]  : 1965  Today's Date: 25  Time Calculation  Start Time: 846  Stop Time: 908  Time Calculation (min): 22 min       Modified Barium Swallow Study completed after informed verbal consent. The study was completed per protocol with various liquid and solid consistencies and a 13mm barium tablet. A 1.9 cm or .75 inch (outer diameter) ring was placed on the chin in the lateral view and on the lateral, left side of the neck in the a-p view in order to complete objective measurements during swallowing. The anatomic structures and function of the oropharynx, larynx, hypopharynx and cervical esophagus were evaluated.    Danelle Valadez MA, CCC-SLP  Phone/Pager: Epic/Haiku secure chat    SPEECH FINDINGS:   Reason for referral: Assess swallow function  Patient hx: 59 y.o. male who presents for dysphagia. States he gets all food/pills/water stuck at any time. Had Upper GI with KUB on 1/15/25. Patient to follow up with Dr. White this date.  Respiratory status: room air  Current diet: Regular/thin    DIET RECOMMENDATIONS:   - Regular (IDDSI Level 7)  - Thin liquids (IDDSI Level 0)  STRATEGIES:   -Sit upright 90 degrees for all PO  -Remain upright 20-30 minutes after meals  -Feed/eat at a slow rate  -Small bite/sip  -Alternate liquids/solids  -Medications whole with water or best tolerated    SLP PLAN:  Skilled SLP Services: No further skilled SLP intervention is warranted for dysphagia at this time.  Discussed POC: Patient  Discussed Risks/Benefits: Yes  Patient/Caregiver Agreeable: Yes    Pain Scale: 0/10, 0-no pain     Education provided: ST provided education to Patient regarding MBSS impressions, recommendations and POC at this time. Verbal understanding and agreement given on all accounts.     Treatment Provided today: No    Additional Medical Consults Suggested: - No new disciplines indicated    Mechanics of the swallow  summary:  ORAL PHASE:  Lip Closure - No labial escape/anterior loss of bolus   Tongue Control During Bolus Hold - Posterior escape of less than half of the bolus   Bolus prep/mastication - Timely and efficient mastication skills   Bolus transport/lingual motion - Brisk tongue motion for A-P movement of the bolus   Oral residue - Residue collection on oral structure     PHARYNGEAL PHASE:  Initiation of pharyngeal swallow - Bolus head at pit of pyriforms   Soft palate elevation - No bolus between soft palate/pharyngeal wall   Laryngeal elevation - Complete superior movement of thyroid cartilage with contact of arytenoids to epiglottic petiole   Anterior hyoid excursion - No anterior movement   Epiglottic movement - Complete inversion    Laryngeal vestibule closure - Complete - no air/contrast in laryngeal vestibule   Pharyngeal stripping wave - Complete  Pharyngeal contraction (A/P view) - Complete  Pharyngoesophageal segment opening - Complete distension and complete duration/no obstruction of flow of bolus   Tongue base retraction - Trace column of contrast or air between tongue base and pharyngeal wall   Pharyngeal residue - Collection of residue within or on the pharyngeal structures     ESOPHAGEAL PHASE:  Esophageal clearance - Esophageal retention with retrograde flow through the pharyngoesophageal segment     Of note, the A-P bolus follow-through is not intended to be utilized as a diagnostic assessment of the esophagus, rather a tool to observe the biomechanical aspects of the swallow continuum and to inform the need for further evaluation by medical specialists, as applicable.     SLP Impressions with severity rating:   Patient presents with oropharyngeal swallow grossly within functional limits and esophageal dysphagia upon completion of modified barium swallow study. Swallowing impairments, identified above, were mild and without significant impact on swallowing safety/efficiency. Patient did not  demonstrate penetration of any consistency, and no aspiration was visualized during study. Patient demonstrated trace-mild oral and pharyngeal residue that was reduced with cued repeat swallows. Presence of an outpouching in the esophagus was visualized in multiple angles. The pouch remained full throughout the study. Intermittent retrograde flow past the UES was captured on imaging with 5mL mildly thick liquid and solids but may have occurred after imaging stopped.     Per the results of this assessment, patient is appropriate to continue baseline diet, with swallow strategies as noted above.  No further skilled ST needs are identified at this time;  ST to remain available as needed upon re-consultation should new concerns arise.     OUTCOME MEASURES:  Eating Assessment Tool (EAT-10)   0=No problem, 1=Mild problem, 2=Mild to moderate problem, 3=Moderate problem, 4=Severe problem    EAT 10  My swallowing problem has caused me to lose weight.: 3  My swallowing problem interferes with my ability to go out for meals.: 2  Swallowing liquids takes extra effort.: 2  Swallowing solids takes extra effort.: 3  Swallowing pills takes extra effort.: 2  Swallowing is painful: 3  The pleasure of eating is affected by my swallowing.: 3  When I swallow food sticks in my throat.: 4  I cough when I eat.: 3  Swallowing is stressful: 3  EAT-10 TOTAL SCORE:: 28    A total score of 3 or above may indicate difficulty with swallowing safely and/or efficiently    Functional Oral Intake Scale  Functional Oral Intake Scale: Level 7        total oral diet with no restrictions    Rosenbek's Penetration Aspiration Scale  Thin Liquids: 1. NO ASPIRATION & NO PENETRATION - no aspiration, contrast does not enter airway  Callaway Thick Liquids: 1. NO ASPIRATION & NO PENETRATION - no aspiration, contrast does not enter airway  Puree: 1. NO ASPIRATION & NO PENETRATION - no aspiration, contrast does not enter airway  Solids: 1. NO ASPIRATION & NO  PENETRATION - no aspiration, contrast does not enter airway

## 2025-04-09 ENCOUNTER — APPOINTMENT (OUTPATIENT)
Dept: GASTROENTEROLOGY | Facility: EXTERNAL LOCATION | Age: 60
End: 2025-04-09
Payer: COMMERCIAL

## 2025-04-18 ENCOUNTER — HOSPITAL ENCOUNTER (OUTPATIENT)
Dept: RADIOLOGY | Facility: CLINIC | Age: 60
Discharge: HOME | End: 2025-04-18
Payer: COMMERCIAL

## 2025-04-18 DIAGNOSIS — R13.19 OTHER DYSPHAGIA: ICD-10-CM

## 2025-04-18 PROCEDURE — 71046 X-RAY EXAM CHEST 2 VIEWS: CPT

## 2025-04-29 ENCOUNTER — CLINICAL SUPPORT (OUTPATIENT)
Dept: PREADMISSION TESTING | Facility: HOSPITAL | Age: 60
End: 2025-04-29
Payer: COMMERCIAL

## 2025-04-29 NOTE — CPM/PAT H&P
CPM/PAT Evaluation       Name: Rosendo Delacruz (Rosendo Delacruz)  /Age: 1965/59 y.o.     { PAT Visit Type:68205}      Chief Complaint: ***    HPI    Medical History[1]    Surgical History[2]    Patient  has no history on file for sexual activity.    Family History[3]    Allergies[4]    Rosendo Delacruz is scheduled for ENDOSCOPIC ZENKER'S DIVERTICULOTOMY WITH CO2 LASER, POSSIBLE FLEXIBLE ESOPHAGOSCOPY, POSSIBLE DILATION AND BOTOX INJECTION on 25  Prior to Admission medications    Not on File        PAT ROS     PAT Physical Exam     Airway    Visit Vitals  Smoking Status Never       DASI Risk Score    No data to display       Caprini DVT Assessment    No data to display       Modified Frailty Index    No data to display       [id number] Stroke Risk Points  Current as of just now        1 0 to 9 Points:      No Change          The [id number] risk score (Lip KEEGAN, et al. 2009. © 2010 American College [employer]) quantifies the risk of stroke for a patient with atrial fibrillation. For patients without atrial fibrillation or under the age of 18 this score appears as N/A. Higher score values generally indicate higher risk of stroke.          Points Metrics   0 Has Congestive Heart Failure:  No     Patients with congestive heart failure get 1 point.    Current as of just now   1 Has Hypertension:  Yes     Patients with hypertension get 1 point.    Current as of just now   0 Age:  59     Patients 65 to 74 years old get 1 point, or patients 75 years and older get 2 points.    Current as of just now   0 Has Diabetes:  No     Patients with diabetes get 1 point.    Current as of just now   0 Had Stroke:  No  Had TIA:  No  Had Thromboembolism:  No     Patients who have had a stroke, TIA, or thromboembolism get 2 points.    Current as of just now   0 Has Vascular Disease:  No     Patients with vascular disease get 1 point.    Current as of just now   0 Clinically Relevant Sex:  Male     Patients with a  clinically relevant sex of Female get 1 point.    Current as of just now             Revised Cardiac Risk Index    No data to display       Apfel Simplified Score    No data to display       Risk Analysis Index Results This Encounter    No data found in the last 10 encounters.       Stop Bang Score      Flowsheet Row Esophagogastroduodenoscopy from 2025 in [facility] with James Andrews MD   Do you snore loudly? 0 filed at 2025   Do you often feel tired or fatigued after your sleep? 0 filed at 2025   Has anyone ever observed you stop breathing in your sleep? 0 filed at 2025   Do you have or are you being treated for high blood pressure? 0 filed at 2025   Recent BMI (Calculated) 26.4 filed at 2025   Is BMI greater than 35 kg/m2? 0=No filed at 2025   Age older than 50 years old? 1=Yes filed at 2025   Gender - Male 1=Yes filed at 2025          Prodigy: High Risk  Total Score: 16              Prodigy Age Score      Prodigy Gender Score          ARISCAT Score for Postoperative Pulmonary Complications    No data to display       Jasmine Perioperative Risk for Myocardial Infarction or Cardiac Arrest (JESÚS)    No data to display     --Testing/Diagnostic:        - EK23, see Care Everywhere  NORMAL SINUS RHYTHM   NORMAL ECG       - Echo: 19 see Media  Summary  Left ventricular ejection fraction is visually estimated at 40-45%.  E/A flow reversal noted. Suggestive of diastolic dysfunction.       - Stress Test: 23, see Care Everywhere  Stress ECG Summary:   The patient's resting heart rate was 65 bpm and blood pressure was 140/96 mmHg. The patient exercised according to the Ramiro protocol. The test was terminated due to general fatigue and the total exercise time was 10 minutes and 30 seconds. No   symptoms provoked during stress. The maximum heart rate was 153 bpm, which is 95% of the predicted heart  rate for age. This is an adequate heart rate response. Peak blood pressure was 196/96 mmHg. The double product achieved was 35163.         - PSA: 4.99 on 01/15/25      Patient Specialist/PCP:                                                                                                           Cardiology: Jennifer Castrejon 02/28/23 evaluation of chest discomfort. Chest pain atypical for angina, will arrange for exercise stress test        PCP: Tariq Dawson 01/15/25 presents for Dysphagia and Annual Exam. History of cardiomyopathy nonischemic, sciatica, HTN, marijuana abuse (twice a week), A-fib with ablation 7-8 years ago (no blood thinners or issues now), osteoarthritis, elevated bilirubin.       ENT: Marcelle White 04/01/25 evaluation of dysphagia and Zenker's diverticulum. Prior EGDs without significant esophageal issues. We had a long discussion about the potential treatment options.     Per Note:  - Schedule for endoscopic laser Zenker's diverticulectomy, possible flexible esophagoscopy dilation, Botox.   - Preop labs, EKG, CXR  - PAT ordered      Gastroenterology: Nasima Quintero CNP 01/23/25 presents for Dysphagia (Patient presents for dysphagia and vomiting. States he gets all food/pills/water stuck at any time. Had Upper GI with KUB. /Colon with Dr. Herrera 10/17. //).         ____________________________________________________________  Medication instructions:   Instructed to hold Vitamins, Supplements and Ibuprofen 7 days prior to surgery       Marce Lake LPN  Preadmission Testing        Nurse Plan of Action:  Cardiology appointment scheduled for 5/7/25          Assessment and Plan:     {[facility] EMBEDDED ASSESSMENT AND PLAN:83352}             [1]   Past Medical History:  Diagnosis Date    Personal history of other diseases of the musculoskeletal system and connective tissue 07/29/2020    History of tendinitis    Personal history of other infectious and parasitic diseases 07/29/2020    History of dermatophytosis     Strain of unspecified muscle(s) and tendon(s) at lower leg level, right leg, initial encounter 09/02/2020    Strain of right knee, initial encounter   [2]   Past Surgical History:  Procedure Laterality Date    COLONOSCOPY      HERNIA REPAIR      Inguinal hernia repair    OTHER SURGICAL HISTORY  07/29/2020    Heart surgery    TONSILLECTOMY     [3] No family history on file.  [4] No Known Allergies

## 2025-04-30 ENCOUNTER — APPOINTMENT (OUTPATIENT)
Dept: GASTROENTEROLOGY | Facility: CLINIC | Age: 60
End: 2025-04-30
Payer: COMMERCIAL

## 2025-05-04 NOTE — H&P (VIEW-ONLY)
CPM/PAT Evaluation       Name: Rosendo Delacruz (Rosendo Delacruz)  /Age: 1965/59 y.o.     Visit Type:   In-Person       Chief Complaint: Perioperative visit     HPI 60 y/o male scheduled for endoscopic zenker's diverticulotomy with C02 laser, possible flexible esophagoscopy, possible dilation and botox injection on 2025 secondary to Zenker's diverticulum with Dr. Marcelle White who referred to CPM.  Presents to CPM today for perioperative risk stratification and optimization. PMHX includes Atrial fibrillation h/o cardioversion 2018, cardiomyopathy, arthritis and dysphagia.      Medical History[1]    Surgical History[2]    Patient Sexual activity questions deferred to the physician.    Family History[3]    Allergies[4]       PAT ROS:   Constitutional:   neg    Neuro/Psych:   neg    Eyes:   neg    Ears:   Nose:   neg    Mouth:   neg    Throat:    dysphagia   voice change  Neck:   neg    Cardio:   neg    Respiratory:    cough  Endocrine:   neg    GI:   neg    :   neg    Musculoskeletal:    arthralgias (4/10 back pain)  Hematologic:   neg    Skin:  neg        Physical Exam  Vitals reviewed.   Constitutional:       Appearance: Normal appearance. He is normal weight.   HENT:      Head: Normocephalic.      Nose: Nose normal.      Mouth/Throat:      Mouth: Mucous membranes are moist.      Pharynx: Oropharynx is clear.   Eyes:      Pupils: Pupils are equal, round, and reactive to light.      Comments: Corrective lenses on    Cardiovascular:      Rate and Rhythm: Normal rate and regular rhythm.      Pulses: Normal pulses.      Heart sounds: Normal heart sounds.   Pulmonary:      Effort: Pulmonary effort is normal.      Breath sounds: Normal breath sounds.   Genitourinary:     Comments: Not examined   Musculoskeletal:         General: Normal range of motion.      Cervical back: Normal range of motion.   Skin:     General: Skin is warm and dry.   Neurological:      General: No focal deficit present.      Mental Status:  He is alert and oriented to person, place, and time.   Psychiatric:         Mood and Affect: Mood normal.         Behavior: Behavior normal.         Thought Content: Thought content normal.         Judgment: Judgment normal.          PAT AIRWAY:   Airway:     Mallampati::  III    TM distance::  >3 FB    Neck ROM::  Full  normal        Visit Vitals  /87   Pulse 62   Temp 36.2 °C (97.1 °F)   Ht 1.829 m (6')   Wt 87.9 kg (193 lb 12.8 oz)   SpO2 95%   BMI 26.28 kg/m²   Smoking Status Never   BSA 2.11 m²       DASI Risk Score      Flowsheet Row Pre-Admission Testing from 5/5/2025 in [facility]   Can you take care of yourself (eat, dress, bathe, or use toilet)?  2.75 filed at 05/05/2025 0744   Can you walk indoors, such as around your house? 1.75 filed at 05/05/2025 0744   Can you walk a block or two on level ground?  2.75 filed at 05/05/2025 0744   Can you climb a flight of stairs or walk up a hill? 5.5 filed at 05/05/2025 0744   Can you run a short distance? 8 filed at 05/05/2025 0744   Can you do light work around the house like dusting or washing dishes? 2.7 filed at 05/05/2025 0744   Can you do moderate work around the house like vacuuming, sweeping floors or carrying groceries? 3.5 filed at 05/05/2025 0744   Can you do heavy work around the house like scrubbing floors or lifting and moving heavy furniture?  8 filed at 05/05/2025 0744   Can you do yard work like raking leaves, weeding or pushing a mower? 4.5 filed at 05/05/2025 0744   Can you have sexual relations? 5.25 filed at 05/05/2025 0744   Can you participate in moderate recreational activities like golf, bowling, dancing, doubles tennis or throwing a baseball or football? 6 filed at 05/05/2025 0744   Can you participate in strenous sports like swimming, singles tennis, football, basketball, or skiing? 7.5 filed at 05/05/2025 0744   DASI SCORE 58.2 filed at 05/05/2025 0744   METS Score (Will be calculated only when all the questions are  answered) 9.9 filed at 05/05/2025 0744          Caprini DVT Assessment      Flowsheet Row Pre-Admission Testing from 5/5/2025 in [facility]   DVT Score (IF A SCORE IS NOT CALCULATING, MUST SELECT A BMI TO COMPLETE) 4 filed at 05/05/2025 0828   Surgical Factors Minor surgery planned filed at 05/05/2025 0828   BMI (BMI MUST BE CHOSEN) 30 or less filed at 05/05/2025 0828          Modified Frailty Index    No data to display       [id number] Stroke Risk Points  Current as of 46 minutes ago        1 0 to 9 Points:      No Change          The [id number] risk score (Lip KEEGAN, et al. 2009. © 2010 [employer]) quantifies the risk of stroke for a patient with atrial fibrillation. For patients without atrial fibrillation or under the age of 18 this score appears as N/A. Higher score values generally indicate higher risk of stroke.          Points Metrics   0 Has Congestive Heart Failure:  No     Patients with congestive heart failure get 1 point.    Current as of 46 minutes ago   1 Has Hypertension:  Yes     Patients with hypertension get 1 point.    Current as of 46 minutes ago   0 Age:  59     Patients 65 to 74 years old get 1 point, or patients 75 years and older get 2 points.    Current as of 46 minutes ago   0 Has Diabetes:  No     Patients with diabetes get 1 point.    Current as of 46 minutes ago   0 Had Stroke:  No  Had TIA:  No  Had Thromboembolism:  No     Patients who have had a stroke, TIA, or thromboembolism get 2 points.    Current as of 46 minutes ago   0 Has Vascular Disease:  No     Patients with vascular disease get 1 point.    Current as of 46 minutes ago   0 Clinically Relevant Sex:  Male     Patients with a clinically relevant sex of Female get 1 point.    Current as of 46 minutes ago             Revised Cardiac Risk Index      Flowsheet Row Pre-Admission Testing from 5/5/2025 in [facility]   High-Risk Surgery (Intraperitoneal,  Intrathoracic,Suprainguinal vascular) 0 filed at 05/05/2025 0829   History of ischemic heart disease (History of MI, History of positive exercuse test, Current chest paint considered due to myocardial ischemia, Use of nitrate therapy, ECG with pathological Q Waves) 0 filed at 05/05/2025 0829   History of congestive heart failure (pulmonary edemia, bilateral rales or S3 gallop, Paroxysmal nocturnal dyspnea, CXR showing pulmonary vascular redistribution) 0 filed at 05/05/2025 0829   History of cerebrovascular disease (Prior TIA or stroke) 0 filed at 05/05/2025 0829   Pre-operative insulin treatment 0 filed at 05/05/2025 0829   Pre-operative creatinine>2 mg/dl 0 filed at 05/05/2025 0829   Revised Cardiac Risk Calculator 0 filed at 05/05/2025 0829          Apfel Simplified Score      Flowsheet Row Pre-Admission Testing from 5/5/2025 in [facility]   Smoking status 0 filed at 05/05/2025 0829   History of motion sickness or PONV  0 filed at 05/05/2025 0829   Use of postoperative opioids 1 filed at 05/05/2025 0829   Gender - Female 0=No filed at 05/05/2025 0829   Apfel Simplified Score Calculator 1 filed at 05/05/2025 0829          Risk Analysis Index Results This Encounter    No data found in the last 10 encounters.       Stop Bang Score      Flowsheet Row Pre-Admission Testing from 5/5/2025 in [facility] Esophagogastroduodenoscopy from 2/5/2025 in [facility] with James Andrews MD   Do you snore loudly? 1 filed at 05/05/2025 0742 0 filed at 02/05/2025 0833   Do you often feel tired or fatigued after your sleep? 0 filed at 05/05/2025 0742 0 filed at 02/05/2025 0833   Has anyone ever observed you stop breathing in your sleep? 0 filed at 05/05/2025 0742 0 filed at 02/05/2025 0833   Do you have or are you being treated for high blood pressure? 0 filed at 05/05/2025 0742 0 filed at 02/05/2025 0833   Recent BMI (Calculated) 26.7 filed at 05/05/2025 0742 26.4 filed at 02/05/2025  0833   Is BMI greater than 35 kg/m2? 0=No filed at 05/05/2025 0742 0=No filed at 02/05/2025 0833   Age older than 50 years old? 1=Yes filed at 05/05/2025 0742 1=Yes filed at 02/05/2025 0833   Is your neck circumference greater than 17 inches (Male) or 16 inches (Female)? 0 filed at 05/05/2025 0742 --   Gender - Male 1=Yes filed at 05/05/2025 0742 1=Yes filed at 02/05/2025 0833   STOP-BANG Total Score 3 filed at 05/05/2025 0742 --          Prodigy: High Risk  Total Score: 16              Prodigy Age Score      Prodigy Gender Score          ARISCAT Score for Postoperative Pulmonary Complications      Flowsheet Row Pre-Admission Testing from 5/5/2025 in [facility]   Age Calculated Score 3 filed at 05/05/2025 0830   Preoperative SpO2 8 filed at 05/05/2025 0830   Respiratory infection in the last month Either upper or lower (i.e., URI, bronchitis, pneumonia), with fever and antibiotic treatment 0 filed at 05/05/2025 0830   Preoperative anemia (Hgb less than 10 g/dl) 0 filed at 05/05/2025 0830   Surgical incision  0 filed at 05/05/2025 0830   Duration of surgery  16 filed at 05/05/2025 0830   Emergency Procedure  0 filed at 05/05/2025 0830   ARISCAT Total Score  27 filed at 05/05/2025 0830          Mitali Perioperative Risk for Myocardial Infarction or Cardiac Arrest (JESÚS)    No data to display         Patient Specialist/PCP:                                                       Cardiology: Dr. Jennifer Castrejon  PCP: Dr. Tariq Dawson   ENT: Dr. Marcelle White   Gastroenterology: Nasima Quintero CNP     Assessment and Plan:     Anesthesia  The patient denies problems with anesthesia in the past such as PONV, prolonged sedation, awareness, dental damage, aspiration, cardiac arrest, difficult intubation, or unexpected hospital admissions.     Airway  No documented or reported history of airway difficulty.     Neurology  The patient has no neurological diagnoses or significant findings on chart review, clinical  "presentation, and evaluation. No grossly apparent neurological perioperative risk. The patient is at increased risk for perioperative stroke secondary to increased age, general anesthesia.    HEENT  No diagnoses or significant findings on chart review or clinical presentation and evaluation.    Cardiovascular  #Cardiomyopathy nonischemic     #HTN, not currently needing any medications    #Atrial fibrillation h/o ablation 2018, currently not on blood thinners     BP: 144/87, no cardiovascular symptoms present   EKG: Normal sinus rhythm, Normal ECG    - Cardiology appointment scheduled for 5/7/25 with Dr. Ortega  - Per note, \"history of non-ischemic CM and afib ablation. Resolved LV systolic function. Negative nuclear stress in 2023. Can produce more than 4 METS activity without symptoms. No need for further testing, acceptable risk for upcoming surgery\".    - TTE 5/19/25:   1. The left ventricular systolic function is normal, with a visually estimated ejection fraction of 55-60%.   2. There is normal right ventricular global systolic function.   3. The pulmonary artery is not well visualized.    He is scheduled for non-cardiac surgery associated with elevated risk. The patient has no major cardiac contraindications to non- cardiac surgery.    METS  The patient's functional capacity is greater than 4 METS.  RCRI  The patient meets 0 RCRI criteria and therefor has a 3.9% risk of major adverse cardiac complications.  JESÚS score which indicates a 0.1% risk of intraoperative or 30-day postoperative MACE (major adverse cardiac event).    Pulmonary  No significant findings on chart review or clinical presentation and evaluation. The patient is at increased risk of perioperative pulmonary complications secondary to advanced age greater than 60.    STOP BANG 3, which places patient at low intermediate risk for having ANA.  ARISCAT 27, Intermediate, 13.3% risk of in-hospital postoperative pulmonary " "complications  PRODIGY 16, high risk of respiratory depression episode.     Patient given PI sheet for preoperative deep breathing exercises.  Encourage  incentive spirometry in the postoperative period as deemed necessary.    Endocrine  No diagnoses or significant findings on chart review or clinical presentation and evaluation.    Gastrointestinal  #Dysphagia in setting of Zenker's diverticulum  -->Saw GI in January 2025, placed on protonix (since stopped, no improvement in symptoms)  Per Dr. Marcelle White note, \"Prior EGDs without significant esophageal issues. We had a long discussion about the potential treatment options. Schedule for endoscopic laser Zenker's diverticulectomy, possible flexible esophagoscopy dilation, Botox\".    KUB 1/17/25  Zenker's diverticulum as described above. No evidence of hiatal hernia or gastroesophageal reflux.    Eat 10- 24,  self-perceived oropharyngeal dysphagia scale (0-40)     Genitourinary  #BPH, no current urinary tract symptoms last PSA 4.99 on 01/15/25    Renal  No renal diagnoses or significant findings on chart review or clinical presentation and evaluation.    Hematology  No diagnoses or significant findings on chart review or clinical presentation and evaluation.    Caprini score 4, high risk of perioperative VTE.     Patient instructed to ambulate as soon as possible postoperatively to decrease thromboembolic risk. Initiate mechanical DVT prophylaxis as soon as possible and initiate chemical prophylaxis when deemed safe from a bleeding standpoint post surgery.     Transfusion Evaluation  A type and screen was obtained given the likelihood for perioperative transfusion of blood or blood products.    Musculoskeletal  #sciatica/back pain seeing physical therapy    #osteoarthritis in his back and bilateral hands, uses oral analgesics as needed     ID  No diagnoses or significant findings on chart review or clinical presentation and evaluation.    Other:  #Marijuana use (twice " a week, last use 2 weeks per patient)    Diagnostic Results    [place] upper GI w KUB 1/17/2025:  Zenker's diverticulum as described above. No evidence of hiatal  hernia or gastroesophageal reflux.    - Echo: 03/08/19 see Media  Left ventricular ejection fraction is visually estimated at 40-45%.  E/A flow reversal noted. Suggestive of diastolic dysfunction.     - Stress Test: 02/28/23, see Care Everywhere  Conclusion: Normal   The patient's resting heart rate was 65 bpm and blood pressure was 140/96 mmHg. The patient exercised according to the Ramiro protocol. The test was terminated due to general fatigue and the total exercise time was 10 minutes and 30 seconds. No   symptoms provoked during stress. The maximum heart rate was 153 bpm, which is 95% of the predicted heart rate for age. This is an adequate heart rate response. Peak blood pressure was 196/96 mmHg. The double product achieved was 34716.     Recent Results (from the past 4 weeks)   Type And Screen Is this order related to pregnancy or an upcoming surgery? Yes; Where will this surgery/delivery be performed? [facility] (formerly [facility] What is the date of the surgery? 5/22/2025; Has this patient ever had a transfusion? No; Has t...    Collection Time: 05/05/25  8:20 AM   Result Value Ref Range    ABO TYPE A     Rh TYPE POS     ANTIBODY SCREEN NEG    CBC    Collection Time: 05/05/25  8:20 AM   Result Value Ref Range    WBC 4.8 4.4 - 11.3 x10*3/uL    nRBC 0.0 0.0 - 0.0 /100 WBCs    RBC 4.56 4.50 - 5.90 x10*6/uL    Hemoglobin 14.1 13.5 - 17.5 g/dL    Hematocrit 40.9 (L) 41.0 - 52.0 %    MCV 90 80 - 100 fL    MCH 30.9 26.0 - 34.0 pg    MCHC 34.5 32.0 - 36.0 g/dL    RDW 12.4 11.5 - 14.5 %    Platelets 260 150 - 450 x10*3/uL   Basic Metabolic Panel    Collection Time: 05/05/25  8:20 AM   Result Value Ref Range    Glucose 94 74 - 99 mg/dL    Sodium 140 136 - 145 mmol/L    Potassium 4.1 3.5 - 5.3 mmol/L    Chloride 106 98 - 107 mmol/L    Bicarbonate 26 21 - 32 mmol/L     Anion Gap 12 10 - 20 mmol/L    Urea Nitrogen 17 6 - 23 mg/dL    Creatinine 0.82 0.50 - 1.30 mg/dL    eGFR >90 >60 mL/min/1.73m*2    Calcium 9.3 8.6 - 10.6 mg/dL   Drug Screen, Urine With Reflex to Confirmation    Collection Time: 05/05/25  8:20 AM   Result Value Ref Range    Amphetamine Screen, Urine Presumptive Negative Presumptive Negative    Barbiturate Screen, Urine Presumptive Negative Presumptive Negative    Benzodiazepines Screen, Urine Presumptive Negative Presumptive Negative    Cannabinoid Screen, Urine Presumptive Negative Presumptive Negative    Cocaine Metabolite Screen, Urine Presumptive Negative Presumptive Negative    Fentanyl Screen, Urine Presumptive Negative Presumptive Negative    Opiate Screen, Urine Presumptive Negative Presumptive Negative    Oxycodone Screen, Urine Presumptive Negative Presumptive Negative    PCP Screen, Urine Presumptive Negative Presumptive Negative    Methadone Screen, Urine Presumptive Negative Presumptive Negative        Labs collected today: CBC, BMP, T/S, urine tox and EKG  -->Echo ordered today, patient has h/o global hypokinesis on last echo and is planning to see Cardiology on 5/7    -Preoperative medication instructions were provided and reviewed with the patient.  Any additional testing or evaluation was explained to the patient.  NPO Instructions were discussed, and the patient's questions were answered prior to conclusion of this encounter. Patient verbalized understanding of preoperative instructions. After Visit Summary given.               [1]   Past Medical History:  Diagnosis Date    Arrhythmia     Afib h/o Cardioversion 2018    Arthritis     Cardiomyopathy, nonischemic (Multi)     Dysphagia     Dysphagia in setting of Zenker's diverticulum    Irregular heart beat     Personal history of other diseases of the musculoskeletal system and connective tissue 07/29/2020    History of tendinitis    Personal history of other infectious and parasitic diseases  07/29/2020    History of dermatophytosis    Strain of unspecified muscle(s) and tendon(s) at lower leg level, right leg, initial encounter 09/02/2020    Strain of right knee, initial encounter    Vision loss     corrective lens   [2]   Past Surgical History:  Procedure Laterality Date    COLONOSCOPY      ESOPHAGOGASTRODUODENOSCOPY      HERNIA REPAIR      Inguinal hernia repair    OTHER SURGICAL HISTORY  07/29/2020    Heart surgery    TONSILLECTOMY     [3]   Family History  Problem Relation Name Age of Onset    Hypertension Mother      Heart attack Father      Cushing syndrome Brother     [4] No Known Allergies

## 2025-05-04 NOTE — CPM/PAT H&P
CPM/PAT Evaluation       Name: Rosendo Delacruz (Rosendo Delacruz)  /Age: 1965/59 y.o.     Visit Type:   In-Person       Chief Complaint: Perioperative visit     HPI 58 y/o male scheduled for endoscopic zenker's diverticulotomy with C02 laser, possible flexible esophagoscopy, possible dilation and botox injection on 2025 secondary to Zenker's diverticulum with Dr. Marcelle White who referred to CPM.  Presents to CPM today for perioperative risk stratification and optimization. PMHX includes Atrial fibrillation h/o cardioversion 2018, cardiomyopathy, arthritis and dysphagia.      Medical History[1]    Surgical History[2]    Patient Sexual activity questions deferred to the physician.    Family History[3]    Allergies[4]       PAT ROS:   Constitutional:   neg    Neuro/Psych:   neg    Eyes:   neg    Ears:   Nose:   neg    Mouth:   neg    Throat:    dysphagia   voice change  Neck:   neg    Cardio:   neg    Respiratory:    cough  Endocrine:   neg    GI:   neg    :   neg    Musculoskeletal:    arthralgias (4/10 back pain)  Hematologic:   neg    Skin:  neg        Physical Exam  Vitals reviewed.   Constitutional:       Appearance: Normal appearance. He is normal weight.   HENT:      Head: Normocephalic.      Nose: Nose normal.      Mouth/Throat:      Mouth: Mucous membranes are moist.      Pharynx: Oropharynx is clear.   Eyes:      Pupils: Pupils are equal, round, and reactive to light.      Comments: Corrective lenses on    Cardiovascular:      Rate and Rhythm: Normal rate and regular rhythm.      Pulses: Normal pulses.      Heart sounds: Normal heart sounds.   Pulmonary:      Effort: Pulmonary effort is normal.      Breath sounds: Normal breath sounds.   Genitourinary:     Comments: Not examined   Musculoskeletal:         General: Normal range of motion.      Cervical back: Normal range of motion.   Skin:     General: Skin is warm and dry.   Neurological:      General: No focal deficit present.      Mental Status:  He is alert and oriented to person, place, and time.   Psychiatric:         Mood and Affect: Mood normal.         Behavior: Behavior normal.         Thought Content: Thought content normal.         Judgment: Judgment normal.          PAT AIRWAY:   Airway:     Mallampati::  III    TM distance::  >3 FB    Neck ROM::  Full  normal        Visit Vitals  /87   Pulse 62   Temp 36.2 °C (97.1 °F)   Ht 1.829 m (6')   Wt 87.9 kg (193 lb 12.8 oz)   SpO2 95%   BMI 26.28 kg/m²   Smoking Status Never   BSA 2.11 m²       DASI Risk Score      Flowsheet Row Pre-Admission Testing from 5/5/2025 in [facility]   Can you take care of yourself (eat, dress, bathe, or use toilet)?  2.75 filed at 05/05/2025 0744   Can you walk indoors, such as around your house? 1.75 filed at 05/05/2025 0744   Can you walk a block or two on level ground?  2.75 filed at 05/05/2025 0744   Can you climb a flight of stairs or walk up a hill? 5.5 filed at 05/05/2025 0744   Can you run a short distance? 8 filed at 05/05/2025 0744   Can you do light work around the house like dusting or washing dishes? 2.7 filed at 05/05/2025 0744   Can you do moderate work around the house like vacuuming, sweeping floors or carrying groceries? 3.5 filed at 05/05/2025 0744   Can you do heavy work around the house like scrubbing floors or lifting and moving heavy furniture?  8 filed at 05/05/2025 0744   Can you do yard work like raking leaves, weeding or pushing a mower? 4.5 filed at 05/05/2025 0744   Can you have sexual relations? 5.25 filed at 05/05/2025 0744   Can you participate in moderate recreational activities like golf, bowling, dancing, doubles tennis or throwing a baseball or football? 6 filed at 05/05/2025 0744   Can you participate in strenous sports like swimming, singles tennis, football, basketball, or skiing? 7.5 filed at 05/05/2025 0744   DASI SCORE 58.2 filed at 05/05/2025 0744   METS Score (Will be calculated only when all the questions are  answered) 9.9 filed at 05/05/2025 0744          Caprini DVT Assessment      Flowsheet Row Pre-Admission Testing from 5/5/2025 in [facility]   DVT Score (IF A SCORE IS NOT CALCULATING, MUST SELECT A BMI TO COMPLETE) 4 filed at 05/05/2025 0828   Surgical Factors Minor surgery planned filed at 05/05/2025 0828   BMI (BMI MUST BE CHOSEN) 30 or less filed at 05/05/2025 0828          Modified Frailty Index    No data to display       [id number] Stroke Risk Points  Current as of 46 minutes ago        1 0 to 9 Points:      No Change          The [id number] risk score (Lip KEEGAN, et al. 2009. © 2010 [employer]) quantifies the risk of stroke for a patient with atrial fibrillation. For patients without atrial fibrillation or under the age of 18 this score appears as N/A. Higher score values generally indicate higher risk of stroke.          Points Metrics   0 Has Congestive Heart Failure:  No     Patients with congestive heart failure get 1 point.    Current as of 46 minutes ago   1 Has Hypertension:  Yes     Patients with hypertension get 1 point.    Current as of 46 minutes ago   0 Age:  59     Patients 65 to 74 years old get 1 point, or patients 75 years and older get 2 points.    Current as of 46 minutes ago   0 Has Diabetes:  No     Patients with diabetes get 1 point.    Current as of 46 minutes ago   0 Had Stroke:  No  Had TIA:  No  Had Thromboembolism:  No     Patients who have had a stroke, TIA, or thromboembolism get 2 points.    Current as of 46 minutes ago   0 Has Vascular Disease:  No     Patients with vascular disease get 1 point.    Current as of 46 minutes ago   0 Clinically Relevant Sex:  Male     Patients with a clinically relevant sex of Female get 1 point.    Current as of 46 minutes ago             Revised Cardiac Risk Index      Flowsheet Row Pre-Admission Testing from 5/5/2025 in [facility]   High-Risk Surgery (Intraperitoneal,  Intrathoracic,Suprainguinal vascular) 0 filed at 05/05/2025 0829   History of ischemic heart disease (History of MI, History of positive exercuse test, Current chest paint considered due to myocardial ischemia, Use of nitrate therapy, ECG with pathological Q Waves) 0 filed at 05/05/2025 0829   History of congestive heart failure (pulmonary edemia, bilateral rales or S3 gallop, Paroxysmal nocturnal dyspnea, CXR showing pulmonary vascular redistribution) 0 filed at 05/05/2025 0829   History of cerebrovascular disease (Prior TIA or stroke) 0 filed at 05/05/2025 0829   Pre-operative insulin treatment 0 filed at 05/05/2025 0829   Pre-operative creatinine>2 mg/dl 0 filed at 05/05/2025 0829   Revised Cardiac Risk Calculator 0 filed at 05/05/2025 0829          Apfel Simplified Score      Flowsheet Row Pre-Admission Testing from 5/5/2025 in [facility]   Smoking status 0 filed at 05/05/2025 0829   History of motion sickness or PONV  0 filed at 05/05/2025 0829   Use of postoperative opioids 1 filed at 05/05/2025 0829   Gender - Female 0=No filed at 05/05/2025 0829   Apfel Simplified Score Calculator 1 filed at 05/05/2025 0829          Risk Analysis Index Results This Encounter    No data found in the last 10 encounters.       Stop Bang Score      Flowsheet Row Pre-Admission Testing from 5/5/2025 in [facility] Esophagogastroduodenoscopy from 2/5/2025 in [facility] with James Andrews MD   Do you snore loudly? 1 filed at 05/05/2025 0742 0 filed at 02/05/2025 0833   Do you often feel tired or fatigued after your sleep? 0 filed at 05/05/2025 0742 0 filed at 02/05/2025 0833   Has anyone ever observed you stop breathing in your sleep? 0 filed at 05/05/2025 0742 0 filed at 02/05/2025 0833   Do you have or are you being treated for high blood pressure? 0 filed at 05/05/2025 0742 0 filed at 02/05/2025 0833   Recent BMI (Calculated) 26.7 filed at 05/05/2025 0742 26.4 filed at 02/05/2025  0833   Is BMI greater than 35 kg/m2? 0=No filed at 05/05/2025 0742 0=No filed at 02/05/2025 0833   Age older than 50 years old? 1=Yes filed at 05/05/2025 0742 1=Yes filed at 02/05/2025 0833   Is your neck circumference greater than 17 inches (Male) or 16 inches (Female)? 0 filed at 05/05/2025 0742 --   Gender - Male 1=Yes filed at 05/05/2025 0742 1=Yes filed at 02/05/2025 0833   STOP-BANG Total Score 3 filed at 05/05/2025 0742 --          Prodigy: High Risk  Total Score: 16              Prodigy Age Score      Prodigy Gender Score          ARISCAT Score for Postoperative Pulmonary Complications      Flowsheet Row Pre-Admission Testing [facility] 5/5/20[facility] in [facility]   Age Calculated Score 3 filed at 05/05/2025 0830   Preoperative SpO2 8 filed at 05/05/2025 0830   Respiratory infection in the last month Either upper or lower (i.e., URI, bronchitis, pneumonia), with fever and antibiotic treatment 0 filed at 05/05/2025 0830   Preoperative anemia (Hgb less than 10 g/dl) 0 filed at 05/05/2025 0830   Surgical incision  0 filed at 05/05/2025 0830   Duration of surgery  16 filed at 05/05/2025 0830   Emergency Procedure  0 filed at 05/05/2025 0830   ARISCAT Total Score  27 filed at 05/05/2025 0830          Mitali Perioperative Risk for Myocardial Infarction or Cardiac Arrest (JESÚS)    No data to display         Patient Specialist/PCP:                                                       Cardiology: Dr. Jennifer Castrejon  PCP: Dr. Tariq Dawsno   ENT: Dr. Marcelle White   Gastroenterology: Nasima Quintero CNP     Assessment and Plan:     Anesthesia  The patient denies problems with anesthesia in the past such as PONV, prolonged sedation, awareness, dental damage, aspiration, cardiac arrest, difficult intubation, or unexpected hospital admissions.     Airway  No documented or reported history of airway difficulty.     Neurology  The patient has no neurological diagnoses or significant findings on chart review, clinical  presentation, and evaluation. No grossly apparent neurological perioperative risk. The patient is at increased risk for perioperative stroke secondary to increased age, general anesthesia.    HEENT  No diagnoses or significant findings on chart review or clinical presentation and evaluation.    Cardiovascular  #Cardiomyopathy nonischemic     #HTN, not currently needing any medications  BP: 144/87, no cardiovascular symptoms present   EKG: Normal sinus rhythm, Normal ECG    #Atrial fibrillation h/o ablation 2018, currently not on blood thinners   -->Cardiology appointment scheduled for 5/7/25, will follow up. Echo was ordered today     He is scheduled for non-cardiac surgery associated with elevated risk. The patient has no major cardiac contraindications to non- cardiac surgery.    METS  The patient's functional capacity is greater than 4 METS.  RCRI  The patient meets 0 RCRI criteria and therefor has a 3.9% risk of major adverse cardiac complications.  JESÚS score which indicates a 0.1% risk of intraoperative or 30-day postoperative MACE (major adverse cardiac event).    Pulmonary  No significant findings on chart review or clinical presentation and evaluation. The patient is at increased risk of perioperative pulmonary complications secondary to advanced age greater than 60.    STOP BANG 3, which places patient at low intermediate risk for having ANA.  ARISCAT 27, Intermediate, 13.3% risk of in-hospital postoperative pulmonary complications  PRODIGY 16, high risk of respiratory depression episode.     Patient given PI sheet for preoperative deep breathing exercises.  Encourage  incentive spirometry in the postoperative period as deemed necessary.    Endocrine  No diagnoses or significant findings on chart review or clinical presentation and evaluation.    Gastrointestinal  #Dysphagia in setting of Zenker's diverticulum  -->Saw GI in January 2025, placed on protonix (since stopped, no improvement in symptoms)  Per  "Dr. Marcelle White note, \"Prior EGDs without significant esophageal issues. We had a long discussion about the potential treatment options. Schedule for endoscopic laser Zenker's diverticulectomy, possible flexible esophagoscopy dilation, Botox\".    KUB 1/17/25  Zenker's diverticulum as described above. No evidence of hiatal hernia or gastroesophageal reflux.    Eat 10- 24,  self-perceived oropharyngeal dysphagia scale (0-40)     Genitourinary  #BPH, no current urinary tract symptoms last PSA 4.99 on 01/15/25    Renal  No renal diagnoses or significant findings on chart review or clinical presentation and evaluation.    Hematology  No diagnoses or significant findings on chart review or clinical presentation and evaluation.    Caprini score 4, high risk of perioperative VTE.     Patient instructed to ambulate as soon as possible postoperatively to decrease thromboembolic risk. Initiate mechanical DVT prophylaxis as soon as possible and initiate chemical prophylaxis when deemed safe from a bleeding standpoint post surgery.     Transfusion Evaluation  A type and screen was obtained given the likelihood for perioperative transfusion of blood or blood products.    Musculoskeletal  #sciatica/back pain seeing physical therapy    #osteoarthritis in his back and bilateral hands, uses oral analgesics as needed     ID  No diagnoses or significant findings on chart review or clinical presentation and evaluation.    Other:  #Marijuana use (twice a week, last use 2 weeks per patient)    Diagnostic Results    [place] upper GI w KUB 1/17/2025:  Zenker's diverticulum as described above. No evidence of hiatal  hernia or gastroesophageal reflux.    - Echo: 03/08/19 see Media  Left ventricular ejection fraction is visually estimated at 40-45%.  E/A flow reversal noted. Suggestive of diastolic dysfunction.     - Stress Test: 02/28/23, see Care Everywhere  Conclusion: Normal   The patient's resting heart rate was 65 bpm and blood pressure was " 140/96 mmHg. The patient exercised according to the Ramiro protocol. The test was terminated due to general fatigue and the total exercise time was 10 minutes and 30 seconds. No   symptoms provoked during stress. The maximum heart rate was 153 bpm, which is 95% of the predicted heart rate for age. This is an adequate heart rate response. Peak blood pressure was 196/96 mmHg. The double product achieved was 01334.     Recent Results (from the past 4 weeks)   Type And Screen Is this order related to pregnancy or an upcoming surgery? Yes; Where will this surgery/delivery be performed? [facility]; What is the date of the surgery? 5/22/2025; Has this patient ever had a transfusion? No; Has t...    Collection Time: 05/05/25  8:20 AM   Result Value Ref Range    ABO TYPE A     Rh TYPE POS     ANTIBODY SCREEN NEG    CBC    Collection Time: 05/05/25  8:20 AM   Result Value Ref Range    WBC 4.8 4.4 - 11.3 x10*3/uL    nRBC 0.0 0.0 - 0.0 /100 WBCs    RBC 4.56 4.50 - 5.90 x10*6/uL    Hemoglobin 14.1 13.5 - 17.5 g/dL    Hematocrit 40.9 (L) 41.0 - 52.0 %    MCV 90 80 - 100 fL    MCH 30.9 26.0 - 34.0 pg    MCHC 34.5 32.0 - 36.0 g/dL    RDW 12.4 11.5 - 14.5 %    Platelets 260 150 - 450 x10*3/uL   Basic Metabolic Panel    Collection Time: 05/05/25  8:20 AM   Result Value Ref Range    Glucose 94 74 - 99 mg/dL    Sodium 140 136 - 145 mmol/L    Potassium 4.1 3.5 - 5.3 mmol/L    Chloride 106 98 - 107 mmol/L    Bicarbonate 26 21 - 32 mmol/L    Anion Gap 12 10 - 20 mmol/L    Urea Nitrogen 17 6 - 23 mg/dL    Creatinine 0.82 0.50 - 1.30 mg/dL    eGFR >90 >60 mL/min/1.73m*2    Calcium 9.3 8.6 - 10.6 mg/dL   Drug Screen, Urine With Reflex to Confirmation    Collection Time: 05/05/25  8:20 AM   Result Value Ref Range    Amphetamine Screen, Urine Presumptive Negative Presumptive Negative    Barbiturate Screen, Urine Presumptive Negative Presumptive Negative    Benzodiazepines Screen, Urine Presumptive Negative Presumptive Negative     Cannabinoid Screen, Urine Presumptive Negative Presumptive Negative    Cocaine Metabolite Screen, Urine Presumptive Negative Presumptive Negative    Fentanyl Screen, Urine Presumptive Negative Presumptive Negative    Opiate Screen, Urine Presumptive Negative Presumptive Negative    Oxycodone Screen, Urine Presumptive Negative Presumptive Negative    PCP Screen, Urine Presumptive Negative Presumptive Negative    Methadone Screen, Urine Presumptive Negative Presumptive Negative        Labs collected today: CBC, BMP, T/S, urine tox and EKG  -->Echo ordered today, patient has h/o global hypokinesis on last echo and is planning to see Cardiology on 5/7    -Preoperative medication instructions were provided and reviewed with the patient.  Any additional testing or evaluation was explained to the patient.  NPO Instructions were discussed, and the patient's questions were answered prior to conclusion of this encounter. Patient verbalized understanding of preoperative instructions. After Visit Summary given.               [1]   Past Medical History:  Diagnosis Date    Arrhythmia     Afib h/o Cardioversion 2018    Arthritis     Cardiomyopathy, nonischemic (Multi)     Dysphagia     Dysphagia in setting of Zenker's diverticulum    Irregular heart beat     Personal history of other diseases of the musculoskeletal system and connective tissue 07/29/2020    History of tendinitis    Personal history of other infectious and parasitic diseases 07/29/2020    History of dermatophytosis    Strain of unspecified muscle(s) and tendon(s) at lower leg level, right leg, initial encounter 09/02/2020    Strain of right knee, initial encounter    Vision loss     corrective lens   [2]   Past Surgical History:  Procedure Laterality Date    COLONOSCOPY      ESOPHAGOGASTRODUODENOSCOPY      HERNIA REPAIR      Inguinal hernia repair    OTHER SURGICAL HISTORY  07/29/2020    Heart surgery    TONSILLECTOMY     [3]   Family History  Problem Relation Name  Age of Onset    Hypertension Mother      Heart attack Father      Cushing syndrome Brother     [4] No Known Allergies

## 2025-05-05 ENCOUNTER — HOSPITAL ENCOUNTER (OUTPATIENT)
Dept: CARDIOLOGY | Facility: HOSPITAL | Age: 60
Discharge: HOME | End: 2025-05-05
Payer: COMMERCIAL

## 2025-05-05 ENCOUNTER — PRE-ADMISSION TESTING (OUTPATIENT)
Dept: PREADMISSION TESTING | Facility: HOSPITAL | Age: 60
End: 2025-05-05
Payer: COMMERCIAL

## 2025-05-05 VITALS
OXYGEN SATURATION: 95 % | HEART RATE: 62 BPM | HEIGHT: 72 IN | SYSTOLIC BLOOD PRESSURE: 144 MMHG | TEMPERATURE: 97.1 F | DIASTOLIC BLOOD PRESSURE: 87 MMHG | WEIGHT: 193.8 LBS | BODY MASS INDEX: 26.25 KG/M2

## 2025-05-05 DIAGNOSIS — Z01.818 PREOPERATIVE TESTING: Primary | ICD-10-CM

## 2025-05-05 DIAGNOSIS — K22.5 ZENKER'S DIVERTICULUM: ICD-10-CM

## 2025-05-05 LAB
ABO GROUP (TYPE) IN BLOOD: NORMAL
AMPHETAMINES UR QL SCN: NORMAL
ANION GAP SERPL CALC-SCNC: 12 MMOL/L (ref 10–20)
ANTIBODY SCREEN: NORMAL
BARBITURATES UR QL SCN: NORMAL
BENZODIAZ UR QL SCN: NORMAL
BUN SERPL-MCNC: 17 MG/DL (ref 6–23)
BZE UR QL SCN: NORMAL
CALCIUM SERPL-MCNC: 9.3 MG/DL (ref 8.6–10.6)
CANNABINOIDS UR QL SCN: NORMAL
CHLORIDE SERPL-SCNC: 106 MMOL/L (ref 98–107)
CO2 SERPL-SCNC: 26 MMOL/L (ref 21–32)
CREAT SERPL-MCNC: 0.82 MG/DL (ref 0.5–1.3)
EGFRCR SERPLBLD CKD-EPI 2021: >90 ML/MIN/1.73M*2
ERYTHROCYTE [DISTWIDTH] IN BLOOD BY AUTOMATED COUNT: 12.4 % (ref 11.5–14.5)
FENTANYL+NORFENTANYL UR QL SCN: NORMAL
GLUCOSE SERPL-MCNC: 94 MG/DL (ref 74–99)
HCT VFR BLD AUTO: 40.9 % (ref 41–52)
HGB BLD-MCNC: 14.1 G/DL (ref 13.5–17.5)
MCH RBC QN AUTO: 30.9 PG (ref 26–34)
MCHC RBC AUTO-ENTMCNC: 34.5 G/DL (ref 32–36)
MCV RBC AUTO: 90 FL (ref 80–100)
METHADONE UR QL SCN: NORMAL
NRBC BLD-RTO: 0 /100 WBCS (ref 0–0)
OPIATES UR QL SCN: NORMAL
OXYCODONE+OXYMORPHONE UR QL SCN: NORMAL
PCP UR QL SCN: NORMAL
PLATELET # BLD AUTO: 260 X10*3/UL (ref 150–450)
POTASSIUM SERPL-SCNC: 4.1 MMOL/L (ref 3.5–5.3)
RBC # BLD AUTO: 4.56 X10*6/UL (ref 4.5–5.9)
RH FACTOR (ANTIGEN D): NORMAL
SODIUM SERPL-SCNC: 140 MMOL/L (ref 136–145)
WBC # BLD AUTO: 4.8 X10*3/UL (ref 4.4–11.3)

## 2025-05-05 PROCEDURE — 93005 ELECTROCARDIOGRAM TRACING: CPT

## 2025-05-05 PROCEDURE — 85027 COMPLETE CBC AUTOMATED: CPT

## 2025-05-05 PROCEDURE — 36415 COLL VENOUS BLD VENIPUNCTURE: CPT

## 2025-05-05 PROCEDURE — 80048 BASIC METABOLIC PNL TOTAL CA: CPT

## 2025-05-05 PROCEDURE — 86850 RBC ANTIBODY SCREEN: CPT

## 2025-05-05 PROCEDURE — 80307 DRUG TEST PRSMV CHEM ANLYZR: CPT

## 2025-05-05 PROCEDURE — 99204 OFFICE O/P NEW MOD 45 MIN: CPT

## 2025-05-05 ASSESSMENT — ENCOUNTER SYMPTOMS
TROUBLE SWALLOWING: 1
NEUROLOGICAL NEGATIVE: 1
CARDIOVASCULAR NEGATIVE: 1
COUGH: 1
NECK NEGATIVE: 1
ENDOCRINE NEGATIVE: 1
EYES NEGATIVE: 1
GASTROINTESTINAL NEGATIVE: 1
CONSTITUTIONAL NEGATIVE: 1
VOICE CHANGE: 1
ARTHRALGIAS: 1

## 2025-05-05 ASSESSMENT — DUKE ACTIVITY SCORE INDEX (DASI)
CAN YOU WALK A BLOCK OR TWO ON LEVEL GROUND: YES
DASI METS SCORE: 9.9
CAN YOU TAKE CARE OF YOURSELF (EAT, DRESS, BATHE, OR USE TOILET): YES
CAN YOU DO MODERATE WORK AROUND THE HOUSE LIKE VACUUMING, SWEEPING FLOORS OR CARRYING GROCERIES: YES
CAN YOU PARTICIPATE IN STRENOUS SPORTS LIKE SWIMMING, SINGLES TENNIS, FOOTBALL, BASKETBALL, OR SKIING: YES
CAN YOU HAVE SEXUAL RELATIONS: YES
CAN YOU PARTICIPATE IN MODERATE RECREATIONAL ACTIVITIES LIKE GOLF, BOWLING, DANCING, DOUBLES TENNIS OR THROWING A BASEBALL OR FOOTBALL: YES
CAN YOU CLIMB A FLIGHT OF STAIRS OR WALK UP A HILL: YES
CAN YOU DO LIGHT WORK AROUND THE HOUSE LIKE DUSTING OR WASHING DISHES: YES
CAN YOU WALK INDOORS, SUCH AS AROUND YOUR HOUSE: YES
CAN YOU DO YARD WORK LIKE RAKING LEAVES, WEEDING OR PUSHING A MOWER: YES
CAN YOU DO HEAVY WORK AROUND THE HOUSE LIKE SCRUBBING FLOORS OR LIFTING AND MOVING HEAVY FURNITURE: YES
TOTAL_SCORE: 58.2
CAN YOU RUN A SHORT DISTANCE: YES

## 2025-05-05 ASSESSMENT — LIFESTYLE VARIABLES: SMOKING_STATUS: SMOKER

## 2025-05-05 NOTE — PREPROCEDURE INSTRUCTIONS
Thank you for visiting [facility] today for your pre-procedure evaluation, you were seen by     Jackie Marie CNP  Department of Anesthesiology and Perioperative Medicine  Main phone [phone]  Fax [phone]    This summary includes instructions and information to aid you during your perioperative period.  Please read carefully. If you have any questions about your visit today, please call the number listed above.  If you become ill or have any changes to your health before your surgery, please contact your primary care provider and alert your surgeon.    General Medications Instructions (see back for further medication instructions)  Hold Vit D supplementation day of surgery  Hold all other vitamins and supplements 7 days prior to surgery  Tylenol okay to continue, please hold Aleve/naproxen/ibuprofen (NSAIDs) for 7 days prior to surgery  No lotion/moisturizers or Deoderant after last shower prior to surgery    You will be called business day prior to surgery to confirm arrival time.     Preparing for Surgery       Preoperative Fasting Guidelines  Why must I stop eating and drinking near surgery time?  With sedation, food or liquid in your stomach can enter your lungs causing serious complications  Food can increase nausea and vomiting  When do I need to stop eating and drinking before my surgery?  Do not eat any food or drink any liquids after midnight the night before your surgery/procedure.  You may have sips of water to take medications. You may chew gum until TWO hours before your surgery/procedure.    Tobacco and Alcohol;  Do not drink alcohol or smoke within 24 hours of surgery.  It is best to quit smoking for as long as possible before any surgery or procedure.    Quitting Smoking; Recognizing Dangerous Situations:  1-Alcohol use during the first month after quitting, 2-Being around smoke or someone who smokes 3-Times situation routinely smoked  4-Triggers-car,  breaks, coffee, when awakening, social events  Coping Skills: 1-Learning new ways to manage stress 2-Exercising 3-Relaxation breathing   4-Change routines 5-Distraction techniques    Websites:  Smoke-Free - offers free text messages and an rufina to help you quit. Info includes eating and mood issues that may come with quitting. There is a Live Helpline to talk to an expert. Go to smokefree.gov; Become an Ex-Smoker - the free EX Plan is based on scientific research and useful advice from ex-smokers. It isn't just about quitting smoking.  It's about re-learning life without cigarettes using a 3-step program.  Go to becomeanex.Head Held High   Centers for Disease Control offer many suggestions for helping you quit. Includes a Quit Guide and real-life stories. There are sections for specific groups such as LGBT, , different ethnic groups, and pregnant women.  Go to cdc.gov/tobacco/campaign/tips    Other Resources:  [place] Tobacco Quit Line - call 1-800-QUIT-NOW or 1-582.444.6059.  [facility] 2-1-1 - to find local programs and resources. Call 211 or go to 211.Head Held High.  [facility] Tobacco Cessation Program - call 111-869-3329.  American Lung Association offers classes for quitting smoking. Some places may charge a fee. For a list of classes, go to lung.org or call 9-324-LUNG-USA.     Some things to think about:; The health benefits of quitting smoking can help most of the major parts of your body. There is no safe amount of cigarette smoke. Quitting smoking can add years to your life. When you quit, you'll also protect your loved ones from dangerous secondhand smoke. Make a plan, join a support group, and talk to your physician to assist in quitting smoking.                                                                                                              , Quitting Alcohol; Things to think about: Alcohol use disorder is a health condition that can improve with treatment. Each person is unique. A treatment that  is good for one person may not be a good fit for someone else. Simply knowing the different options can be an important first step. Treatment can be inpatient, where you stay at a facility, or outpatient, where you stay at home. Your insurance plan may cover some treatment costs.   Resources:    [facility] Alcohol Treatment Navigator - from the National [place] on Alcohol Abuse and  Alcoholism. Offers an online tool to help you find the right treatment for you - near you. Go to alcoholtreatment.niaaa.nih.gov.  [facility] National Hotline  - from the Substance Abuse and Mental Health Services Administration. A free, confidential 24-hour treatment referral and information service for anyone facing mental and/or substance use disorders. Go to findtreatment.gov or call 8-587-403eReceipts (0720).    Alcoholics Anonymous (AA) - offers group meetings and a 12-step program to anyone who has a drinking problem. Go to aa.org or call 360-673-0765    [employer] 2-1-1 - to find local programs and resources. Call 211 or go to Qwilr    Other people you can talk to about treatment options include your primary care doctor, health insurance plan, local health department, or employee assistance program. You can also ask to talk to a hospital .           The Week before Surgery        Seven days before Surgery  Check your [facility] medication instructions  Do the exercises provided to you by [facility]   Arrange for a responsible, adult licensed  to take you home after surgery and stay with you for 24 hours.  You will not be permitted to drive yourself home if you have received any anesthetic/sedation  Five days before surgery  Check your [facility] medication instructions  Do the exercises provided to you by [facility]   Start using Chlorhexidene (CHG) body wash if prescribed (Continue till day of surgery)      The Day before Surgery       Check your [facility] medication and all other [facility] instructions including when to stop eating and drinking  You will be  called with your arrival time for surgery in the late afternoon.  If you do not receive a call please reach out to your surgeon's office.  Do not smoke or drink 24 hours before surgery  Prepare items to bring with you to the hospital  Shower with your chlorhexidine wash if prescribed  Brush your teeth and use your chlorhexidine dental rinse if prescribed    The Day of Surgery       Check your [facility] medication instructions  Shower, if prescribed use CHG.  Do not apply any lotions, creams, moisturizers, perfume or deodorant  Brush your teeth and use your CHG dental rinse if prescribed  Wear loose comfortable clothing  Avoid make-up  Remove  jewelry and piercings, consider professional piercing removal with a plastic spacer if needed  Bring photo ID and Insurance card  Bring an accurate medication list that includes medication dose, frequency and allergies  Bring a copy of your advanced directives (will, health care power of )  Bring any devices and controllers as well as medical devices you have been provided with for surgery (CPAP, slings, braces, etc.)  Dentures, eyeglasses, and contacts will be removed before surgery, please bring cases for contacts or glasses    Preoperative Deep Breathing Exercises    Why it is important to do deep breathing exercises before my surgery?  Deep breathing exercises strengthen your breathing muscles.  This helps you to recover after your surgery and decreases the chance of breathing complications.    How are the deep breathing exercises done?  Sit straight with your back supported.  Breathe in deeply and slowly through your nose. Your lower rib cage should expand and your abdomen may move forward.  Hold that breath for 3 to 5 seconds.  Breathe out through pursed lips, slowly and completely.  Rest and repeat 10 times every hour while awake.  Rest longer if you become dizzy or lightheaded.      Preoperative Brain Exercises    What are brain exercises?  A brain exercise is any  activity that engages your thinking (cognitive) skills.    What types of activities are considered brain exercises?  Jigsaw puzzles, crossword puzzles, word jumble, memory games, word search, and many more.  Many can be found free online or on your phone via a mobile rufina.    Why should I do brain exercises before my surgery?  More recent research has shown brain exercise before surgery can lower the risk of postoperative delirium (confusion) which can be especially important for older adults.  Patients who did brain exercises for 5 to 10 hours the days before surgery, cut their risk of postoperative delirium in half up to 1 week after surgery.    Sit-to-Stand Exercise    What is the sit-to-stand exercise?  The sit-to-stand exercise strengthens the muscles of your lower body and muscles in the center of your body (core muscles for stability) helping to maintain and improve your strength and mobility.  How do I do the sit-to-stand exercise?  The goal is to do this exercise without using your arms or hands.  If this is too difficult, use your arms and hands or a chair with armrests to help slowly push yourself to the standing position and lower yourself back to the sitting position. As the movement becomes easier use your arms and hands less.    Steps to the sit-to-stand exercise  Sit up tall in a sturdy chair, knees bent, feet flat on the floor shoulder-width apart.  Shift your hips/pelvis forward in the chair to correctly position yourself for the next movement.  Lean forward at your hips.  Stand up straight putting equal weight on both feet.  Check to be sure you are properly aligned with the chair, in a slow controlled movement sit back down.  Repeat this exercise 10-15 times.  If needed you can do it fewer times until your strength improves.  Rest for 1 minute.  Do another 10-15 sit-to-stand exercises.  Try to do this in the morning and evening.       Simple things you can do to help prevent blood clots     Blood  clots are blockages that can form in the body's veins. When a blood clot forms in your deep veins, it may be called a deep vein thrombosis, or DVT for short. Blood clots can happen in any part of the body where blood flows, but they are most common in the arms and legs. If a piece of a blood clot breaks free and travels to the lungs, it is called a pulmonary embolus (PE). A PE can be a very serious problem.      Being in the hospital or having surgery can raise your chances of getting a blood clot because you may not be well enough to move around as much as you normally do.         Ways you can help prevent blood clots in the hospital       Wearing SCDs  SCDs stands for Sequential Compression Devices.   SCDs are special sleeves that wrap around your legs. They attach to a pump that fills them with air to gently squeeze your legs every few minutes.  This helps return the blood in your legs to your heart.   SCDs should only be taken off when walking or bathing. SCDs may not be comfortable, but they can help save your life.              Pump SCD leg sleeves  Wearing compression stockings - if your doctor orders them. These special snug-fitting stockings gently squeeze your legs to help blood flow.       Walking. Walking helps move the blood in your legs.   If your doctor says it is ok, try walking the halls at least   5 times a day. Ask us to help you get up, so you don't fall.      Taking any blood-thinning medicines your doctor orders.              Ways you can help prevent blood clots at home         Wearing compression stockings - if your doctor orders them.   Walking - to help move the blood in your legs.    Taking any blood-thinning medicines your doctor orders.      Signs of a blood clot or PE    Tell your doctor or nurse right away if you have any of the problems listed below.         If you are at home, seek medical care right away. Call 911 for chest pain or problems breathing.            Signs of a blood  clot (DVT) - such as pain, swelling, redness, or warmth in your arm or legs.  Signs of a pulmonary embolism (PE) - such as chest pain or feeling short of breath

## 2025-05-05 NOTE — NURSING NOTE
Patient seen in PAT. Orders reviewed with PAT Provider.     Following labs obtained by documenting RN: t/s, urine, bmp, cbc    [id number]    Patient discharged with: Pre-procedure instructions/AVS, no further questions, echo scheduled      Deedee LÓPEZ, RN  [facility]   [facility]

## 2025-05-06 NOTE — PREPROCEDURE INSTRUCTIONS
Thank you for visiting [facility] today for your pre-procedure evaluation, you were seen by     Jackie Marie CNP  Department of Anesthesiology and Perioperative Medicine  Main phone [phone]  Fax [phone]    This summary includes instructions and information to aid you during your perioperative period.  Please read carefully. If you have any questions about your visit today, please call the number listed above.  If you become ill or have any changes to your health before your surgery, please contact your primary care provider and alert your surgeon.    General Medications Instructions (see back for further medication instructions)  Hold Vit D supplementation day of surgery  Hold all other vitamins and supplements 7 days prior to surgery  Tylenol okay to continue, please hold Aleve/naproxen/ibuprofen (NSAIDs) for 7 days prior to surgery  No lotion/moisturizers or Deoderant after last shower prior to surgery    You will be called business day prior to surgery to confirm arrival time.     Preparing for Surgery       Preoperative Fasting Guidelines  Why must I stop eating and drinking near surgery time?  With sedation, food or liquid in your stomach can enter your lungs causing serious complications  Food can increase nausea and vomiting  When do I need to stop eating and drinking before my surgery?  Do not eat any food or drink any liquids after midnight the night before your surgery/procedure.  You may have sips of water to take medications. You may chew gum until TWO hours before your surgery/procedure.    Tobacco and Alcohol;  Do not drink alcohol or smoke within 24 hours of surgery.  It is best to quit smoking for as long as possible before any surgery or procedure.    Quitting Smoking; Recognizing Dangerous Situations:  1-Alcohol use during the first month after quitting, 2-Being around smoke or someone who smokes 3-Times situation routinely smoked  4-Triggers-car,  breaks, coffee, when awakening, social events  Coping Skills: 1-Learning new ways to manage stress 2-Exercising 3-Relaxation breathing   4-Change routines 5-Distraction techniques    Websites:  Smoke-Free - offers free text messages and an rufina to help you quit. Info includes eating and mood issues that may come with quitting. There is a Live Helpline to talk to an expert. Go to smokefree.gov; Become an Ex-Smoker - the free EX Plan is based on scientific research and useful advice from ex-smokers. It isn't just about quitting smoking.  It's about re-learning life without cigarettes using a 3-step program.  Go to becomeanex.[place]   Centers for Disease Control offer many suggestions for helping you quit. Includes a Quit Guide and real-life stories. There are sections for specific groups such as LGBT, , different ethnic groups, and pregnant women.  Go to cdc.gov/tobacco/campaign/tips    Other Resources:  [place] Tobacco Quit Line - call 1-800-QUIT-NOW or 1-284.989.4878.  [facility] 2-1-1 - to find local programs and resources. Call 211 or go to 211.Guomai.  [facility] Tobacco Cessation Program - call 575-515-0801.  American Lung Association offers classes for quitting smoking. Some places may charge a fee. For a list of classes, go to lung.org or call 5-609-LUNG-USA.     Some things to think about:; The health benefits of quitting smoking can help most of the major parts of your body. There is no safe amount of cigarette smoke. Quitting smoking can add years to your life. When you quit, you'll also protect your loved ones from dangerous secondhand smoke. Make a plan, join a support group, and talk to your physician to assist in quitting smoking.                                                                                                              , Quitting Alcohol; Things to think about: Alcohol use disorder is a health condition that can improve with treatment. Each person is unique. A treatment that  is good for one person may not be a good fit for someone else. Simply knowing the different options can be an important first step. Treatment can be inpatient, where you stay at a facility, or outpatient, where you stay at home. Your insurance plan may cover some treatment costs.   Resources:    [facility] Alcohol Treatment Navigator - from the National [place] on Alcohol Abuse and  Alcoholism. Offers an online tool to help you find the right treatment for you - near you. Go to alcoholtreatment.niaaa.nih.gov.  [facility] National Hotline  - from the Substance Abuse and Mental Health Services Administration. A free, confidential 24-hour treatment referral and information service for anyone facing mental and/or substance use disorders. Go to findtreatment.gov or call 3-707-205[employer] (5483).    Alcoholics Anonymous (AA) - offers group meetings and a 12-step program to anyone who has a drinking problem. Go to aa.org or call 835-932-7059    [place] 2-1-1 - to find local programs and resources. Call 211 or go to [employer]    Other people you can talk to about treatment options include your primary care doctor, health insurance plan, local health department, or employee assistance program. You can also ask to talk to a hospital .           The Week before Surgery        Seven days before Surgery  Check your [facility] medication instructions  Do the exercises provided to you by [facility]   Arrange for a responsible, adult licensed  to take you home after surgery and stay with you for 24 hours.  You will not be permitted to drive yourself home if you have received any anesthetic/sedation  Five days before surgery  Check your [facility] medication instructions  Do the exercises provided to you by [facility]   Start using Chlorhexidene (CHG) body wash if prescribed (Continue till day of surgery)      The Day before Surgery       Check your [facility] medication and all other [facility] instructions including when to stop eating and drinking  You will be  called with your arrival time for surgery in the late afternoon.  If you do not receive a call please reach out to your surgeon's office.  Do not smoke or drink 24 hours before surgery  Prepare items to bring with you to the hospital  Shower with your chlorhexidine wash if prescribed  Brush your teeth and use your chlorhexidine dental rinse if prescribed    The Day of Surgery       Check your [facility] medication instructions  Shower, if prescribed use CHG.  Do not apply any lotions, creams, moisturizers, perfume or deodorant  Brush your teeth and use your CHG dental rinse if prescribed  Wear loose comfortable clothing  Avoid make-up  Remove  jewelry and piercings, consider professional piercing removal with a plastic spacer if needed  Bring photo ID and Insurance card  Bring an accurate medication list that includes medication dose, frequency and allergies  Bring a copy of your advanced directives (will, health care power of )  Bring any devices and controllers as well as medical devices you have been provided with for surgery (CPAP, slings, braces, etc.)  Dentures, eyeglasses, and contacts will be removed before surgery, please bring cases for contacts or glasses    Preoperative Deep Breathing Exercises    Why it is important to do deep breathing exercises before my surgery?  Deep breathing exercises strengthen your breathing muscles.  This helps you to recover after your surgery and decreases the chance of breathing complications.    How are the deep breathing exercises done?  Sit straight with your back supported.  Breathe in deeply and slowly through your nose. Your lower rib cage should expand and your abdomen may move forward.  Hold that breath for 3 to 5 seconds.  Breathe out through pursed lips, slowly and completely.  Rest and repeat 10 times every hour while awake.  Rest longer if you become dizzy or lightheaded.      Preoperative Brain Exercises    What are brain exercises?  A brain exercise is any  activity that engages your thinking (cognitive) skills.    What types of activities are considered brain exercises?  Jigsaw puzzles, crossword puzzles, word jumble, memory games, word search, and many more.  Many can be found free online or on your phone via a mobile rufina.    Why should I do brain exercises before my surgery?  More recent research has shown brain exercise before surgery can lower the risk of postoperative delirium (confusion) which can be especially important for older adults.  Patients who did brain exercises for 5 to 10 hours the days before surgery, cut their risk of postoperative delirium in half up to 1 week after surgery.    Sit-to-Stand Exercise    What is the sit-to-stand exercise?  The sit-to-stand exercise strengthens the muscles of your lower body and muscles in the center of your body (core muscles for stability) helping to maintain and improve your strength and mobility.  How do I do the sit-to-stand exercise?  The goal is to do this exercise without using your arms or hands.  If this is too difficult, use your arms and hands or a chair with armrests to help slowly push yourself to the standing position and lower yourself back to the sitting position. As the movement becomes easier use your arms and hands less.    Steps to the sit-to-stand exercise  Sit up tall in a sturdy chair, knees bent, feet flat on the floor shoulder-width apart.  Shift your hips/pelvis forward in the chair to correctly position yourself for the next movement.  Lean forward at your hips.  Stand up straight putting equal weight on both feet.  Check to be sure you are properly aligned with the chair, in a slow controlled movement sit back down.  Repeat this exercise 10-15 times.  If needed you can do it fewer times until your strength improves.  Rest for 1 minute.  Do another 10-15 sit-to-stand exercises.  Try to do this in the morning and evening.       Simple things you can do to help prevent blood clots     Blood  clots are blockages that can form in the body's veins. When a blood clot forms in your deep veins, it may be called a deep vein thrombosis, or DVT for short. Blood clots can happen in any part of the body where blood flows, but they are most common in the arms and legs. If a piece of a blood clot breaks free and travels to the lungs, it is called a pulmonary embolus (PE). A PE can be a very serious problem.      Being in the hospital or having surgery can raise your chances of getting a blood clot because you may not be well enough to move around as much as you normally do.         Ways you can help prevent blood clots in the hospital       Wearing SCDs  SCDs stands for Sequential Compression Devices.   SCDs are special sleeves that wrap around your legs. They attach to a pump that fills them with air to gently squeeze your legs every few minutes.  This helps return the blood in your legs to your heart.   SCDs should only be taken off when walking or bathing. SCDs may not be comfortable, but they can help save your life.              Pump SCD leg sleeves  Wearing compression stockings - if your doctor orders them. These special snug-fitting stockings gently squeeze your legs to help blood flow.       Walking. Walking helps move the blood in your legs.   If your doctor says it is ok, try walking the halls at least   5 times a day. Ask us to help you get up, so you don't fall.      Taking any blood-thinning medicines your doctor orders.              Ways you can help prevent blood clots at home         Wearing compression stockings - if your doctor orders them.   Walking - to help move the blood in your legs.    Taking any blood-thinning medicines your doctor orders.      Signs of a blood clot or PE    Tell your doctor or nurse right away if you have any of the problems listed below.         If you are at home, seek medical care right away. Call 911 for chest pain or problems breathing.            Signs of a blood  clot (DVT) - such as pain, swelling, redness, or warmth in your arm or legs.  Signs of a pulmonary embolism (PE) - such as chest pain or feeling short of breath

## 2025-05-07 ENCOUNTER — APPOINTMENT (OUTPATIENT)
Facility: CLINIC | Age: 60
End: 2025-05-07
Payer: COMMERCIAL

## 2025-05-07 VITALS
HEIGHT: 72 IN | BODY MASS INDEX: 26.71 KG/M2 | DIASTOLIC BLOOD PRESSURE: 83 MMHG | WEIGHT: 197.2 LBS | HEART RATE: 59 BPM | OXYGEN SATURATION: 96 % | SYSTOLIC BLOOD PRESSURE: 159 MMHG

## 2025-05-07 DIAGNOSIS — Z01.818 PRE-OPERATIVE CLEARANCE: ICD-10-CM

## 2025-05-07 DIAGNOSIS — I48.91 ATRIAL FIBRILLATION, UNSPECIFIED TYPE (MULTI): Primary | ICD-10-CM

## 2025-05-07 DIAGNOSIS — I42.8 CARDIOMYOPATHY, NONISCHEMIC (MULTI): ICD-10-CM

## 2025-05-07 DIAGNOSIS — I10 PRIMARY HYPERTENSION: ICD-10-CM

## 2025-05-07 PROCEDURE — 3077F SYST BP >= 140 MM HG: CPT | Performed by: INTERNAL MEDICINE

## 2025-05-07 PROCEDURE — 93000 ELECTROCARDIOGRAM COMPLETE: CPT | Performed by: INTERNAL MEDICINE

## 2025-05-07 PROCEDURE — 3079F DIAST BP 80-89 MM HG: CPT | Performed by: INTERNAL MEDICINE

## 2025-05-07 PROCEDURE — 99204 OFFICE O/P NEW MOD 45 MIN: CPT | Performed by: INTERNAL MEDICINE

## 2025-05-07 PROCEDURE — 3008F BODY MASS INDEX DOCD: CPT | Performed by: INTERNAL MEDICINE

## 2025-05-07 NOTE — PROGRESS NOTES
Patient presents for cardiovascular evaluation in the [place] office at the request of Tariq Dawson MD for the following:    Chief Complaint:  Cardiac Clearance      HISTORY OF PRESENT ILLNESS:      Rosendo Delacruz is a 59 y.o. male with prior cardiac history of afib 2018 ablation at [facility]. Nuc stress in 2016 EF 46%.  Echo EF 40%.   History of RACHEL CV at [facility] with Dr. Trevino.  Other pertinent medical history includes negative treadmill stress in 2023 at [facility] with Cash.    Patient reports symptoms of difficulty swallowing, has zenkers, need surgery.    Patient decribes no chest pain with *** radiation, lasting for ***, aggravating factors are ***, alleviating factors are ***.  The chest pain is associated with ***.      Patient reports experiencing *** weight gain, with *** change over the past ***.  The patient also reports no dyspnea on exertion, *** orthopnea, *** nocturnal dyspnea, and *** lower extremity edema.     No palpitations, syncope, or claudication.  No family history of CAD.  No tobacco smoking.    Past Medical History:  Medical History[1]     Past Surgical History:  Recent Surgeries in Cardiology            No cases to display              Social History:   reports that he has never smoked. He has never used smokeless tobacco. He reports current alcohol use. He reports current drug use. Drug: Marijuana.     Family History:  family history includes Cancer in his brother; Cushing syndrome in his brother; Heart attack in his father; Hypertension in his mother.     Allergies:  Patient has no known allergies.    Outpatient Medications:  No current outpatient medications    ROS: 12 review of systems negative other than chief complaint    PHYSICAL EXAM    Vitals:    05/07/25 0917   BP: 159/83   BP Location: Left arm   Patient Position: Sitting   Pulse: 59   SpO2: 96%   Weight: 89.4 kg (197 lb 3.2 oz)   Height: 1.829 m (6')     General: No acute distress, appears comfortable  Cardiac: Regular rate  "and rhythm with no murmurs rubs or gallops, normal S1-S2  Pulmonary: Clear to auscultation bilaterally with no rales or rhonchi, normal respiratory effort  Gastrointestinal: Soft abdomen with no tenderness or guarding, no rebound  Musculoskeletal: No lower extremity edema, normal  Neurological: Alert and oriented x 4, appropriate, moving all extremities well, normal affect  Psychiatric: Normal affect, mood appropriate to occasion  Skin: No rashes, hives or jaundice  HEENT: Normocephalic, atraumatic.  Pupils equal round and reactive.    Last Labs:    CBC -  Lab Results   Component Value Date    WBC 4.8 05/05/2025    HGB 14.1 05/05/2025    HCT 40.9 (L) 05/05/2025    MCV 90 05/05/2025     05/05/2025       CMP -  Lab Results   Component Value Date    CALCIUM 9.3 05/05/2025    PROT 8.0 01/15/2025    ALBUMIN 4.6 01/15/2025    AST 37 01/15/2025    ALT 53 (H) 01/15/2025    ALKPHOS 72 01/15/2025    BILITOT 1.9 (H) 01/15/2025       LIPID PANEL -   Lab Results   Component Value Date    CHOL 178 01/15/2025    HDL 63.1 01/15/2025    CHHDL 2.8 01/15/2025    VLDL 19 01/15/2025    TRIG 94 01/15/2025    NHDL 115 01/15/2025       RENAL FUNCTION PANEL -   Lab Results   Component Value Date    K 4.1 05/05/2025       No results found for: \"BNP\", \"HGBA1C\"    Last Cardiology Tests: NSR no ischemia,HR 58,        Assessment/Plan   {Assess/Plan SmartLinks:00498}    Assessment and plan (narrative):    Rosendo Delacruz is a 59 y.o. male     Followup: ***    Tunde Disla MD         [1]   Past Medical History:  Diagnosis Date    Arrhythmia     Afib h/o Cardioversion 2018    Arthritis     Cardiomyopathy, nonischemic (Multi)     Dysphagia     Dysphagia in setting of Zenker's diverticulum    Irregular heart beat     Personal history of other diseases of the musculoskeletal system and connective tissue 07/29/2020    History of tendinitis    Personal history of other infectious and parasitic diseases 07/29/2020    History of " dermatophytosis    Strain of unspecified muscle(s) and tendon(s) at lower leg level, right leg, initial encounter 09/02/2020    Strain of right knee, initial encounter    Tinnitus     Vision loss     corrective lens

## 2025-05-13 PROCEDURE — 93005 ELECTROCARDIOGRAM TRACING: CPT

## 2025-05-14 LAB
ATRIAL RATE: 63 BPM
P AXIS: 79 DEGREES
P OFFSET: 194 MS
P ONSET: 138 MS
PR INTERVAL: 162 MS
Q ONSET: 219 MS
QRS COUNT: 10 BEATS
QRS DURATION: 84 MS
QT INTERVAL: 426 MS
QTC CALCULATION(BAZETT): 435 MS
QTC FREDERICIA: 433 MS
R AXIS: 44 DEGREES
T AXIS: 49 DEGREES
T OFFSET: 432 MS
VENTRICULAR RATE: 63 BPM

## 2025-05-19 ENCOUNTER — HOSPITAL ENCOUNTER (OUTPATIENT)
Dept: CARDIOLOGY | Facility: CLINIC | Age: 60
Discharge: HOME | End: 2025-05-19
Payer: COMMERCIAL

## 2025-05-19 DIAGNOSIS — Z01.818 ENCOUNTER FOR OTHER PREPROCEDURAL EXAMINATION: ICD-10-CM

## 2025-05-19 DIAGNOSIS — K22.5 ZENKER'S DIVERTICULUM: ICD-10-CM

## 2025-05-19 LAB
AORTIC VALVE PEAK VELOCITY: 1.26 M/S
AV PEAK GRADIENT: 6 MMHG
AVA (PEAK VEL): 4.69 CM2
EJECTION FRACTION APICAL 4 CHAMBER: 50.8
EJECTION FRACTION: 58 %
LEFT ATRIUM VOLUME AREA LENGTH INDEX BSA: 25 ML/M2
LEFT VENTRICLE INTERNAL DIMENSION DIASTOLE: 4.87 CM (ref 3.5–6)
LEFT VENTRICULAR OUTFLOW TRACT DIAMETER: 2.53 CM
MITRAL VALVE E/A RATIO: 1.82
RIGHT VENTRICLE FREE WALL PEAK S': 14 CM/S
TRICUSPID ANNULAR PLANE SYSTOLIC EXCURSION: 2.5 CM

## 2025-05-19 PROCEDURE — 93306 TTE W/DOPPLER COMPLETE: CPT | Performed by: INTERNAL MEDICINE

## 2025-05-19 PROCEDURE — 93306 TTE W/DOPPLER COMPLETE: CPT

## 2025-05-21 ENCOUNTER — ANESTHESIA EVENT (OUTPATIENT)
Dept: OPERATING ROOM | Facility: HOSPITAL | Age: 60
End: 2025-05-21
Payer: COMMERCIAL

## 2025-05-22 ENCOUNTER — APPOINTMENT (OUTPATIENT)
Dept: RADIOLOGY | Facility: HOSPITAL | Age: 60
DRG: 982 | End: 2025-05-22
Payer: COMMERCIAL

## 2025-05-22 ENCOUNTER — HOSPITAL ENCOUNTER (INPATIENT)
Facility: HOSPITAL | Age: 60
LOS: 5 days | Discharge: HOME | DRG: 982 | End: 2025-05-28
Attending: OTOLARYNGOLOGY | Admitting: OTOLARYNGOLOGY
Payer: COMMERCIAL

## 2025-05-22 ENCOUNTER — ANESTHESIA (OUTPATIENT)
Dept: OPERATING ROOM | Facility: HOSPITAL | Age: 60
End: 2025-05-22
Payer: COMMERCIAL

## 2025-05-22 DIAGNOSIS — K22.5 ZENKER'S DIVERTICULUM: Primary | ICD-10-CM

## 2025-05-22 DIAGNOSIS — R13.10 DYSPHAGIA, UNSPECIFIED TYPE: ICD-10-CM

## 2025-05-22 DIAGNOSIS — R13.19 OTHER DYSPHAGIA: ICD-10-CM

## 2025-05-22 PROBLEM — R07.9 CHEST PAIN: Status: RESOLVED | Noted: 2025-01-22 | Resolved: 2025-05-22

## 2025-05-22 LAB
ABO GROUP (TYPE) IN BLOOD: NORMAL
ALBUMIN SERPL BCP-MCNC: 4.1 G/DL (ref 3.4–5)
ANION GAP SERPL CALC-SCNC: 10 MMOL/L (ref 10–20)
BUN SERPL-MCNC: 13 MG/DL (ref 6–23)
CALCIUM SERPL-MCNC: 9.1 MG/DL (ref 8.6–10.6)
CHLORIDE SERPL-SCNC: 105 MMOL/L (ref 98–107)
CO2 SERPL-SCNC: 27 MMOL/L (ref 21–32)
CREAT SERPL-MCNC: 0.78 MG/DL (ref 0.5–1.3)
EGFRCR SERPLBLD CKD-EPI 2021: >90 ML/MIN/1.73M*2
GLUCOSE SERPL-MCNC: 118 MG/DL (ref 74–99)
PHOSPHATE SERPL-MCNC: 3.5 MG/DL (ref 2.5–4.9)
POTASSIUM SERPL-SCNC: 3.9 MMOL/L (ref 3.5–5.3)
RH FACTOR (ANTIGEN D): NORMAL
SODIUM SERPL-SCNC: 138 MMOL/L (ref 136–145)

## 2025-05-22 PROCEDURE — 3700000001 HC GENERAL ANESTHESIA TIME - INITIAL BASE CHARGE: Performed by: OTOLARYNGOLOGY

## 2025-05-22 PROCEDURE — 1170000001 HC PRIVATE ONCOLOGY ROOM DAILY

## 2025-05-22 PROCEDURE — 0DJ08ZZ INSPECTION OF UPPER INTESTINAL TRACT, VIA NATURAL OR ARTIFICIAL OPENING ENDOSCOPIC: ICD-10-PCS | Performed by: OTOLARYNGOLOGY

## 2025-05-22 PROCEDURE — 7100000002 HC RECOVERY ROOM TIME - EACH INCREMENTAL 1 MINUTE: Performed by: OTOLARYNGOLOGY

## 2025-05-22 PROCEDURE — 80069 RENAL FUNCTION PANEL: CPT | Performed by: STUDENT IN AN ORGANIZED HEALTH CARE EDUCATION/TRAINING PROGRAM

## 2025-05-22 PROCEDURE — 2500000005 HC RX 250 GENERAL PHARMACY W/O HCPCS: Performed by: OTOLARYNGOLOGY

## 2025-05-22 PROCEDURE — 7100000001 HC RECOVERY ROOM TIME - INITIAL BASE CHARGE: Performed by: OTOLARYNGOLOGY

## 2025-05-22 PROCEDURE — 0KB44ZZ EXCISION OF TONGUE, PALATE, PHARYNX MUSCLE, PERCUTANEOUS ENDOSCOPIC APPROACH: ICD-10-PCS | Performed by: OTOLARYNGOLOGY

## 2025-05-22 PROCEDURE — 2720000007 HC OR 272 NO HCPCS: Performed by: OTOLARYNGOLOGY

## 2025-05-22 PROCEDURE — A43180 PR ESOPHAGOSCP RIG TRANSORAL HYPOPHARYNX CRV ESOPH: Performed by: NURSE ANESTHETIST, CERTIFIED REGISTERED

## 2025-05-22 PROCEDURE — 36415 COLL VENOUS BLD VENIPUNCTURE: CPT | Performed by: ANESTHESIOLOGY

## 2025-05-22 PROCEDURE — 74018 RADEX ABDOMEN 1 VIEW: CPT | Performed by: RADIOLOGY

## 2025-05-22 PROCEDURE — 2500000004 HC RX 250 GENERAL PHARMACY W/ HCPCS (ALT 636 FOR OP/ED): Mod: JZ | Performed by: ANESTHESIOLOGY

## 2025-05-22 PROCEDURE — 3E0G76Z INTRODUCTION OF NUTRITIONAL SUBSTANCE INTO UPPER GI, VIA NATURAL OR ARTIFICIAL OPENING: ICD-10-PCS | Performed by: OTOLARYNGOLOGY

## 2025-05-22 PROCEDURE — 7100000011 HC EXTENDED STAY RECOVERY HOURLY - NURSING UNIT

## 2025-05-22 PROCEDURE — 3600000017 HC OR TIME - EACH INCREMENTAL 1 MINUTE - PROCEDURE LEVEL SIX: Performed by: OTOLARYNGOLOGY

## 2025-05-22 PROCEDURE — 2500000005 HC RX 250 GENERAL PHARMACY W/O HCPCS: Performed by: ANESTHESIOLOGY

## 2025-05-22 PROCEDURE — 71045 X-RAY EXAM CHEST 1 VIEW: CPT | Performed by: RADIOLOGY

## 2025-05-22 PROCEDURE — 2500000001 HC RX 250 WO HCPCS SELF ADMINISTERED DRUGS (ALT 637 FOR MEDICARE OP)

## 2025-05-22 PROCEDURE — 36415 COLL VENOUS BLD VENIPUNCTURE: CPT | Performed by: STUDENT IN AN ORGANIZED HEALTH CARE EDUCATION/TRAINING PROGRAM

## 2025-05-22 PROCEDURE — 88305 TISSUE EXAM BY PATHOLOGIST: CPT | Mod: TC,SUR,WESLAB | Performed by: OTOLARYNGOLOGY

## 2025-05-22 PROCEDURE — 74018 RADEX ABDOMEN 1 VIEW: CPT

## 2025-05-22 PROCEDURE — 96372 THER/PROPH/DIAG INJ SC/IM: CPT | Performed by: STUDENT IN AN ORGANIZED HEALTH CARE EDUCATION/TRAINING PROGRAM

## 2025-05-22 PROCEDURE — 3600000003 HC OR TIME - INITIAL BASE CHARGE - PROCEDURE LEVEL THREE: Performed by: OTOLARYNGOLOGY

## 2025-05-22 PROCEDURE — 88305 TISSUE EXAM BY PATHOLOGIST: CPT | Performed by: STUDENT IN AN ORGANIZED HEALTH CARE EDUCATION/TRAINING PROGRAM

## 2025-05-22 PROCEDURE — 3700000002 HC GENERAL ANESTHESIA TIME - EACH INCREMENTAL 1 MINUTE: Performed by: OTOLARYNGOLOGY

## 2025-05-22 PROCEDURE — 2500000004 HC RX 250 GENERAL PHARMACY W/ HCPCS (ALT 636 FOR OP/ED): Performed by: OTOLARYNGOLOGY

## 2025-05-22 PROCEDURE — 2500000004 HC RX 250 GENERAL PHARMACY W/ HCPCS (ALT 636 FOR OP/ED): Mod: JZ | Performed by: NURSE ANESTHETIST, CERTIFIED REGISTERED

## 2025-05-22 PROCEDURE — 3600000008 HC OR TIME - EACH INCREMENTAL 1 MINUTE - PROCEDURE LEVEL THREE: Performed by: OTOLARYNGOLOGY

## 2025-05-22 PROCEDURE — C1889 IMPLANT/INSERT DEVICE, NOC: HCPCS | Performed by: OTOLARYNGOLOGY

## 2025-05-22 PROCEDURE — 71045 X-RAY EXAM CHEST 1 VIEW: CPT

## 2025-05-22 PROCEDURE — A43180 PR ESOPHAGOSCP RIG TRANSORAL HYPOPHARYNX CRV ESOPH: Performed by: ANESTHESIOLOGY

## 2025-05-22 PROCEDURE — 3600000018 HC OR TIME - INITIAL BASE CHARGE - PROCEDURE LEVEL SIX: Performed by: OTOLARYNGOLOGY

## 2025-05-22 PROCEDURE — 43180 ESOPHAGOSCOPY RIGID TRNSO: CPT | Performed by: OTOLARYNGOLOGY

## 2025-05-22 PROCEDURE — 2500000001 HC RX 250 WO HCPCS SELF ADMINISTERED DRUGS (ALT 637 FOR MEDICARE OP): Performed by: STUDENT IN AN ORGANIZED HEALTH CARE EDUCATION/TRAINING PROGRAM

## 2025-05-22 PROCEDURE — 2500000004 HC RX 250 GENERAL PHARMACY W/ HCPCS (ALT 636 FOR OP/ED): Mod: JZ | Performed by: STUDENT IN AN ORGANIZED HEALTH CARE EDUCATION/TRAINING PROGRAM

## 2025-05-22 PROCEDURE — 0DB18ZZ EXCISION OF UPPER ESOPHAGUS, VIA NATURAL OR ARTIFICIAL OPENING ENDOSCOPIC: ICD-10-PCS | Performed by: OTOLARYNGOLOGY

## 2025-05-22 RX ORDER — OXYCODONE HYDROCHLORIDE 5 MG/1
5 TABLET ORAL EVERY 4 HOURS PRN
Status: DISCONTINUED | OUTPATIENT
Start: 2025-05-22 | End: 2025-05-22

## 2025-05-22 RX ORDER — FENTANYL CITRATE 50 UG/ML
INJECTION, SOLUTION INTRAMUSCULAR; INTRAVENOUS AS NEEDED
Status: DISCONTINUED | OUTPATIENT
Start: 2025-05-22 | End: 2025-05-22

## 2025-05-22 RX ORDER — ACETAMINOPHEN 160 MG/5ML
1000 SOLUTION ORAL EVERY 8 HOURS SCHEDULED
Status: DISCONTINUED | OUTPATIENT
Start: 2025-05-22 | End: 2025-05-26

## 2025-05-22 RX ORDER — ROCURONIUM BROMIDE 10 MG/ML
INJECTION, SOLUTION INTRAVENOUS AS NEEDED
Status: DISCONTINUED | OUTPATIENT
Start: 2025-05-22 | End: 2025-05-22

## 2025-05-22 RX ORDER — OXYCODONE HYDROCHLORIDE 5 MG/1
5 TABLET ORAL EVERY 4 HOURS PRN
Status: DISCONTINUED | OUTPATIENT
Start: 2025-05-22 | End: 2025-05-22 | Stop reason: HOSPADM

## 2025-05-22 RX ORDER — ALBUTEROL SULFATE 0.83 MG/ML
2.5 SOLUTION RESPIRATORY (INHALATION) ONCE AS NEEDED
Status: DISCONTINUED | OUTPATIENT
Start: 2025-05-22 | End: 2025-05-22

## 2025-05-22 RX ORDER — AMPICILLIN AND SULBACTAM 2; 1 G/1; G/1
INJECTION, POWDER, FOR SOLUTION INTRAMUSCULAR; INTRAVENOUS AS NEEDED
Status: DISCONTINUED | OUTPATIENT
Start: 2025-05-22 | End: 2025-05-22

## 2025-05-22 RX ORDER — NALOXONE HYDROCHLORIDE 0.4 MG/ML
0.2 INJECTION, SOLUTION INTRAMUSCULAR; INTRAVENOUS; SUBCUTANEOUS EVERY 5 MIN PRN
Status: DISCONTINUED | OUTPATIENT
Start: 2025-05-22 | End: 2025-05-28 | Stop reason: HOSPADM

## 2025-05-22 RX ORDER — ENOXAPARIN SODIUM 100 MG/ML
40 INJECTION SUBCUTANEOUS EVERY 24 HOURS
Status: DISCONTINUED | OUTPATIENT
Start: 2025-05-22 | End: 2025-05-28 | Stop reason: HOSPADM

## 2025-05-22 RX ORDER — DOCUSATE SODIUM 50 MG/5ML
100 LIQUID ORAL 2 TIMES DAILY
Status: DISCONTINUED | OUTPATIENT
Start: 2025-05-22 | End: 2025-05-28 | Stop reason: HOSPADM

## 2025-05-22 RX ORDER — REMIFENTANIL HYDROCHLORIDE 1 MG/ML
INJECTION, POWDER, LYOPHILIZED, FOR SOLUTION INTRAVENOUS AS NEEDED
Status: DISCONTINUED | OUTPATIENT
Start: 2025-05-22 | End: 2025-05-22

## 2025-05-22 RX ORDER — SUCCINYLCHOLINE CHLORIDE 20 MG/ML
INJECTION INTRAMUSCULAR; INTRAVENOUS AS NEEDED
Status: DISCONTINUED | OUTPATIENT
Start: 2025-05-22 | End: 2025-05-22

## 2025-05-22 RX ORDER — HYDRALAZINE HYDROCHLORIDE 20 MG/ML
5 INJECTION INTRAMUSCULAR; INTRAVENOUS EVERY 30 MIN PRN
Status: DISCONTINUED | OUTPATIENT
Start: 2025-05-22 | End: 2025-05-22 | Stop reason: HOSPADM

## 2025-05-22 RX ORDER — SODIUM CHLORIDE, SODIUM LACTATE, POTASSIUM CHLORIDE, CALCIUM CHLORIDE 600; 310; 30; 20 MG/100ML; MG/100ML; MG/100ML; MG/100ML
20 INJECTION, SOLUTION INTRAVENOUS CONTINUOUS
Status: ACTIVE | OUTPATIENT
Start: 2025-05-22 | End: 2025-05-23

## 2025-05-22 RX ORDER — SODIUM CHLORIDE, SODIUM LACTATE, POTASSIUM CHLORIDE, CALCIUM CHLORIDE 600; 310; 30; 20 MG/100ML; MG/100ML; MG/100ML; MG/100ML
100 INJECTION, SOLUTION INTRAVENOUS CONTINUOUS
Status: ACTIVE | OUTPATIENT
Start: 2025-05-22 | End: 2025-05-22

## 2025-05-22 RX ORDER — DEXMEDETOMIDINE IN 0.9 % NACL 20 MCG/5ML
SYRINGE (ML) INTRAVENOUS AS NEEDED
Status: DISCONTINUED | OUTPATIENT
Start: 2025-05-22 | End: 2025-05-22

## 2025-05-22 RX ORDER — GLYCOPYRROLATE 0.2 MG/ML
INJECTION INTRAMUSCULAR; INTRAVENOUS AS NEEDED
Status: DISCONTINUED | OUTPATIENT
Start: 2025-05-22 | End: 2025-05-22

## 2025-05-22 RX ORDER — ACETAMINOPHEN 325 MG/1
975 TABLET ORAL ONCE
Status: DISCONTINUED | OUTPATIENT
Start: 2025-05-22 | End: 2025-05-22

## 2025-05-22 RX ORDER — PROPOFOL 10 MG/ML
INJECTION, EMULSION INTRAVENOUS AS NEEDED
Status: DISCONTINUED | OUTPATIENT
Start: 2025-05-22 | End: 2025-05-22

## 2025-05-22 RX ORDER — LIDOCAINE HYDROCHLORIDE 20 MG/ML
INJECTION, SOLUTION INFILTRATION; PERINEURAL AS NEEDED
Status: DISCONTINUED | OUTPATIENT
Start: 2025-05-22 | End: 2025-05-22

## 2025-05-22 RX ORDER — HYDROMORPHONE HYDROCHLORIDE 1 MG/ML
INJECTION, SOLUTION INTRAMUSCULAR; INTRAVENOUS; SUBCUTANEOUS AS NEEDED
Status: DISCONTINUED | OUTPATIENT
Start: 2025-05-22 | End: 2025-05-22

## 2025-05-22 RX ORDER — POLYETHYLENE GLYCOL 3350 17 G/17G
17 POWDER, FOR SOLUTION ORAL DAILY PRN
Status: DISCONTINUED | OUTPATIENT
Start: 2025-05-22 | End: 2025-05-28 | Stop reason: HOSPADM

## 2025-05-22 RX ORDER — ONDANSETRON HYDROCHLORIDE 2 MG/ML
4 INJECTION, SOLUTION INTRAVENOUS ONCE AS NEEDED
Status: DISCONTINUED | OUTPATIENT
Start: 2025-05-22 | End: 2025-05-22

## 2025-05-22 RX ORDER — LIDOCAINE HYDROCHLORIDE 10 MG/ML
0.1 INJECTION, SOLUTION INFILTRATION; PERINEURAL ONCE
Status: DISCONTINUED | OUTPATIENT
Start: 2025-05-22 | End: 2025-05-22

## 2025-05-22 RX ORDER — SODIUM CHLORIDE 0.9 G/100ML
INJECTION, SOLUTION IRRIGATION AS NEEDED
Status: DISCONTINUED | OUTPATIENT
Start: 2025-05-22 | End: 2025-05-22 | Stop reason: HOSPADM

## 2025-05-22 RX ORDER — ALBUTEROL SULFATE 0.83 MG/ML
2.5 SOLUTION RESPIRATORY (INHALATION) ONCE AS NEEDED
Status: DISCONTINUED | OUTPATIENT
Start: 2025-05-22 | End: 2025-05-22 | Stop reason: HOSPADM

## 2025-05-22 RX ORDER — DIPHENHYDRAMINE HYDROCHLORIDE 50 MG/ML
12.5 INJECTION, SOLUTION INTRAMUSCULAR; INTRAVENOUS ONCE AS NEEDED
Status: DISCONTINUED | OUTPATIENT
Start: 2025-05-22 | End: 2025-05-22 | Stop reason: HOSPADM

## 2025-05-22 RX ORDER — ONDANSETRON HYDROCHLORIDE 2 MG/ML
4 INJECTION, SOLUTION INTRAVENOUS EVERY 8 HOURS PRN
Status: DISCONTINUED | OUTPATIENT
Start: 2025-05-22 | End: 2025-05-28 | Stop reason: HOSPADM

## 2025-05-22 RX ORDER — MEPERIDINE HYDROCHLORIDE 25 MG/ML
12.5 INJECTION INTRAMUSCULAR; INTRAVENOUS; SUBCUTANEOUS EVERY 10 MIN PRN
Status: DISCONTINUED | OUTPATIENT
Start: 2025-05-22 | End: 2025-05-22 | Stop reason: HOSPADM

## 2025-05-22 RX ORDER — HYDROMORPHONE HYDROCHLORIDE 0.2 MG/ML
0.2 INJECTION INTRAMUSCULAR; INTRAVENOUS; SUBCUTANEOUS EVERY 5 MIN PRN
Status: DISCONTINUED | OUTPATIENT
Start: 2025-05-22 | End: 2025-05-22 | Stop reason: HOSPADM

## 2025-05-22 RX ORDER — HYDROMORPHONE HYDROCHLORIDE 1 MG/ML
1 INJECTION, SOLUTION INTRAMUSCULAR; INTRAVENOUS; SUBCUTANEOUS EVERY 5 MIN PRN
Status: DISCONTINUED | OUTPATIENT
Start: 2025-05-22 | End: 2025-05-22

## 2025-05-22 RX ORDER — ONDANSETRON 4 MG/1
4 TABLET, ORALLY DISINTEGRATING ORAL EVERY 8 HOURS PRN
Status: DISCONTINUED | OUTPATIENT
Start: 2025-05-22 | End: 2025-05-28 | Stop reason: HOSPADM

## 2025-05-22 RX ORDER — ONDANSETRON HYDROCHLORIDE 2 MG/ML
INJECTION, SOLUTION INTRAVENOUS AS NEEDED
Status: DISCONTINUED | OUTPATIENT
Start: 2025-05-22 | End: 2025-05-22

## 2025-05-22 RX ORDER — ACETAMINOPHEN 325 MG/1
650 TABLET ORAL EVERY 4 HOURS PRN
Status: DISCONTINUED | OUTPATIENT
Start: 2025-05-22 | End: 2025-05-22 | Stop reason: HOSPADM

## 2025-05-22 RX ORDER — FENTANYL CITRATE 50 UG/ML
12.5 INJECTION, SOLUTION INTRAMUSCULAR; INTRAVENOUS EVERY 5 MIN PRN
Status: DISCONTINUED | OUTPATIENT
Start: 2025-05-22 | End: 2025-05-22 | Stop reason: HOSPADM

## 2025-05-22 RX ORDER — LIDOCAINE HYDROCHLORIDE 10 MG/ML
0.1 INJECTION, SOLUTION INFILTRATION; PERINEURAL ONCE
Status: DISCONTINUED | OUTPATIENT
Start: 2025-05-22 | End: 2025-05-22 | Stop reason: HOSPADM

## 2025-05-22 RX ORDER — EPINEPHRINE 1 MG/ML
INJECTION, SOLUTION, CONCENTRATE INTRAVENOUS AS NEEDED
Status: DISCONTINUED | OUTPATIENT
Start: 2025-05-22 | End: 2025-05-22 | Stop reason: HOSPADM

## 2025-05-22 RX ORDER — SODIUM CHLORIDE, SODIUM LACTATE, POTASSIUM CHLORIDE, CALCIUM CHLORIDE 600; 310; 30; 20 MG/100ML; MG/100ML; MG/100ML; MG/100ML
100 INJECTION, SOLUTION INTRAVENOUS CONTINUOUS
Status: DISCONTINUED | OUTPATIENT
Start: 2025-05-22 | End: 2025-05-22 | Stop reason: HOSPADM

## 2025-05-22 RX ORDER — ONDANSETRON HYDROCHLORIDE 2 MG/ML
4 INJECTION, SOLUTION INTRAVENOUS ONCE AS NEEDED
Status: DISCONTINUED | OUTPATIENT
Start: 2025-05-22 | End: 2025-05-22 | Stop reason: HOSPADM

## 2025-05-22 RX ORDER — ESOMEPRAZOLE MAGNESIUM 40 MG/1
40 GRANULE, DELAYED RELEASE ORAL
Status: DISCONTINUED | OUTPATIENT
Start: 2025-05-23 | End: 2025-05-28 | Stop reason: HOSPADM

## 2025-05-22 RX ORDER — ACETAMINOPHEN 10 MG/ML
1000 INJECTION, SOLUTION INTRAVENOUS ONCE
Status: COMPLETED | OUTPATIENT
Start: 2025-05-22 | End: 2025-05-22

## 2025-05-22 RX ORDER — SODIUM CHLORIDE, SODIUM LACTATE, POTASSIUM CHLORIDE, CALCIUM CHLORIDE 600; 310; 30; 20 MG/100ML; MG/100ML; MG/100ML; MG/100ML
100 INJECTION, SOLUTION INTRAVENOUS CONTINUOUS
Status: ACTIVE | OUTPATIENT
Start: 2025-05-22 | End: 2025-05-23

## 2025-05-22 RX ORDER — MIDAZOLAM HYDROCHLORIDE 1 MG/ML
INJECTION INTRAMUSCULAR; INTRAVENOUS AS NEEDED
Status: DISCONTINUED | OUTPATIENT
Start: 2025-05-22 | End: 2025-05-22

## 2025-05-22 RX ORDER — MIDAZOLAM HYDROCHLORIDE 1 MG/ML
1 INJECTION INTRAMUSCULAR; INTRAVENOUS ONCE AS NEEDED
Status: DISCONTINUED | OUTPATIENT
Start: 2025-05-22 | End: 2025-05-22 | Stop reason: HOSPADM

## 2025-05-22 RX ORDER — OXYCODONE HCL 5 MG/5 ML
5 SOLUTION, ORAL ORAL EVERY 4 HOURS PRN
Status: DISCONTINUED | OUTPATIENT
Start: 2025-05-22 | End: 2025-05-28 | Stop reason: HOSPADM

## 2025-05-22 RX ADMIN — Medication 1 SPRAY: at 23:09

## 2025-05-22 RX ADMIN — OXYCODONE HYDROCHLORIDE 5 MG: 5 SOLUTION ORAL at 23:09

## 2025-05-22 RX ADMIN — HYDROMORPHONE HYDROCHLORIDE 0.2 MG: 1 INJECTION, SOLUTION INTRAMUSCULAR; INTRAVENOUS; SUBCUTANEOUS at 10:40

## 2025-05-22 RX ADMIN — HYDROMORPHONE HYDROCHLORIDE 0.2 MG: 1 INJECTION, SOLUTION INTRAMUSCULAR; INTRAVENOUS; SUBCUTANEOUS at 10:38

## 2025-05-22 RX ADMIN — SODIUM CHLORIDE, SODIUM LACTATE, POTASSIUM CHLORIDE, AND CALCIUM CHLORIDE: 600; 310; 30; 20 INJECTION, SOLUTION INTRAVENOUS at 07:19

## 2025-05-22 RX ADMIN — ACETAMINOPHEN 1000 MG: 160 SOLUTION ORAL at 14:30

## 2025-05-22 RX ADMIN — HYDROMORPHONE HYDROCHLORIDE 0.2 MG: 1 INJECTION, SOLUTION INTRAMUSCULAR; INTRAVENOUS; SUBCUTANEOUS at 10:34

## 2025-05-22 RX ADMIN — ONDANSETRON 4 MG: 2 INJECTION INTRAMUSCULAR; INTRAVENOUS at 10:06

## 2025-05-22 RX ADMIN — ACETAMINOPHEN 1000 MG: 160 SOLUTION ORAL at 21:13

## 2025-05-22 RX ADMIN — OXYCODONE HYDROCHLORIDE 5 MG: 5 SOLUTION ORAL at 14:32

## 2025-05-22 RX ADMIN — GLYCOPYRROLATE 0.2 MG: 0.2 INJECTION, SOLUTION INTRAMUSCULAR; INTRAVENOUS at 07:27

## 2025-05-22 RX ADMIN — SODIUM CHLORIDE, SODIUM LACTATE, POTASSIUM CHLORIDE, AND CALCIUM CHLORIDE 100 ML/HR: .6; .31; .03; .02 INJECTION, SOLUTION INTRAVENOUS at 23:12

## 2025-05-22 RX ADMIN — MIDAZOLAM HYDROCHLORIDE 2 MG: 2 INJECTION, SOLUTION INTRAMUSCULAR; INTRAVENOUS at 07:16

## 2025-05-22 RX ADMIN — REMIFENTANIL HYDROCHLORIDE 20 MCG: 1 INJECTION, POWDER, LYOPHILIZED, FOR SOLUTION INTRAVENOUS at 09:06

## 2025-05-22 RX ADMIN — Medication 2 L/MIN: at 11:15

## 2025-05-22 RX ADMIN — ROCURONIUM BROMIDE 20 MG: 10 INJECTION INTRAVENOUS at 09:33

## 2025-05-22 RX ADMIN — HYDROMORPHONE HYDROCHLORIDE 0.4 MG: 1 INJECTION, SOLUTION INTRAMUSCULAR; INTRAVENOUS; SUBCUTANEOUS at 11:10

## 2025-05-22 RX ADMIN — ENOXAPARIN SODIUM 40 MG: 100 INJECTION SUBCUTANEOUS at 21:13

## 2025-05-22 RX ADMIN — SUCCINYLCHOLINE CHLORIDE 120 MG: 20 INJECTION, SOLUTION INTRAMUSCULAR; INTRAVENOUS at 07:31

## 2025-05-22 RX ADMIN — ROCURONIUM BROMIDE 50 MG: 10 INJECTION INTRAVENOUS at 07:41

## 2025-05-22 RX ADMIN — PROPOFOL 50 MG: 10 INJECTION, EMULSION INTRAVENOUS at 07:34

## 2025-05-22 RX ADMIN — HYDROMORPHONE HYDROCHLORIDE 0.4 MG: 1 INJECTION, SOLUTION INTRAMUSCULAR; INTRAVENOUS; SUBCUTANEOUS at 12:14

## 2025-05-22 RX ADMIN — SODIUM CHLORIDE, SODIUM LACTATE, POTASSIUM CHLORIDE, AND CALCIUM CHLORIDE 100 ML/HR: .6; .31; .03; .02 INJECTION, SOLUTION INTRAVENOUS at 14:14

## 2025-05-22 RX ADMIN — REMIFENTANIL HYDROCHLORIDE 20 MCG: 1 INJECTION, POWDER, LYOPHILIZED, FOR SOLUTION INTRAVENOUS at 07:58

## 2025-05-22 RX ADMIN — DEXAMETHASONE SODIUM PHOSPHATE 4 MG: 4 INJECTION, SOLUTION INTRA-ARTICULAR; INTRALESIONAL; INTRAMUSCULAR; INTRAVENOUS; SOFT TISSUE at 07:39

## 2025-05-22 RX ADMIN — Medication 8 MCG: at 08:58

## 2025-05-22 RX ADMIN — Medication 8 MCG: at 10:05

## 2025-05-22 RX ADMIN — FENTANYL CITRATE 100 MCG: 50 INJECTION, SOLUTION INTRAMUSCULAR; INTRAVENOUS at 07:31

## 2025-05-22 RX ADMIN — ACETAMINOPHEN 1000 MG: 1000 INJECTION, SOLUTION INTRAVENOUS at 11:12

## 2025-05-22 RX ADMIN — AMPICILLIN SODIUM AND SULBACTAM SODIUM 3 G: 2; 1 INJECTION, POWDER, FOR SOLUTION INTRAMUSCULAR; INTRAVENOUS at 07:39

## 2025-05-22 RX ADMIN — ROCURONIUM BROMIDE 20 MG: 10 INJECTION INTRAVENOUS at 08:35

## 2025-05-22 RX ADMIN — HYDROMORPHONE HYDROCHLORIDE 0.4 MG: 1 INJECTION, SOLUTION INTRAMUSCULAR; INTRAVENOUS; SUBCUTANEOUS at 11:43

## 2025-05-22 RX ADMIN — REMIFENTANIL HYDROCHLORIDE 0.05 MCG/KG/MIN: 1 INJECTION, POWDER, LYOPHILIZED, FOR SOLUTION INTRAVENOUS at 07:42

## 2025-05-22 RX ADMIN — PROPOFOL 150 MG: 10 INJECTION, EMULSION INTRAVENOUS at 07:31

## 2025-05-22 RX ADMIN — SUGAMMADEX 200 MG: 100 INJECTION, SOLUTION INTRAVENOUS at 10:12

## 2025-05-22 RX ADMIN — HYDROMORPHONE HYDROCHLORIDE 0.4 MG: 1 INJECTION, SOLUTION INTRAMUSCULAR; INTRAVENOUS; SUBCUTANEOUS at 10:43

## 2025-05-22 RX ADMIN — OXYCODONE HYDROCHLORIDE 5 MG: 5 SOLUTION ORAL at 18:40

## 2025-05-22 RX ADMIN — LIDOCAINE HYDROCHLORIDE 100 MG: 20 INJECTION, SOLUTION INFILTRATION; PERINEURAL at 07:31

## 2025-05-22 SDOH — SOCIAL STABILITY: SOCIAL INSECURITY: WITHIN THE LAST YEAR, HAVE YOU BEEN AFRAID OF YOUR PARTNER OR EX-PARTNER?: NO

## 2025-05-22 SDOH — SOCIAL STABILITY: SOCIAL INSECURITY: WERE YOU ABLE TO COMPLETE ALL THE BEHAVIORAL HEALTH SCREENINGS?: YES

## 2025-05-22 SDOH — SOCIAL STABILITY: SOCIAL INSECURITY: HAS ANYONE EVER THREATENED TO HURT YOUR FAMILY OR YOUR PETS?: NO

## 2025-05-22 SDOH — ECONOMIC STABILITY: FOOD INSECURITY: WITHIN THE PAST 12 MONTHS, YOU WORRIED THAT YOUR FOOD WOULD RUN OUT BEFORE YOU GOT THE MONEY TO BUY MORE.: NEVER TRUE

## 2025-05-22 SDOH — SOCIAL STABILITY: SOCIAL INSECURITY: DO YOU FEEL UNSAFE GOING BACK TO THE PLACE WHERE YOU ARE LIVING?: NO

## 2025-05-22 SDOH — HEALTH STABILITY: MENTAL HEALTH
DO YOU FEEL STRESS - TENSE, RESTLESS, NERVOUS, OR ANXIOUS, OR UNABLE TO SLEEP AT NIGHT BECAUSE YOUR MIND IS TROUBLED ALL THE TIME - THESE DAYS?: NOT AT ALL

## 2025-05-22 SDOH — SOCIAL STABILITY: SOCIAL INSECURITY: HAVE YOU HAD ANY THOUGHTS OF HARMING ANYONE ELSE?: NO

## 2025-05-22 SDOH — SOCIAL STABILITY: SOCIAL NETWORK
IN A TYPICAL WEEK, HOW MANY TIMES DO YOU TALK ON THE PHONE WITH FAMILY, FRIENDS, OR NEIGHBORS?: MORE THAN THREE TIMES A WEEK

## 2025-05-22 SDOH — ECONOMIC STABILITY: INCOME INSECURITY: IN THE PAST 12 MONTHS HAS THE ELECTRIC, GAS, OIL, OR WATER COMPANY THREATENED TO SHUT OFF SERVICES IN YOUR HOME?: NO

## 2025-05-22 SDOH — SOCIAL STABILITY: SOCIAL NETWORK
DO YOU BELONG TO ANY CLUBS OR ORGANIZATIONS SUCH AS CHURCH GROUPS, UNIONS, FRATERNAL OR ATHLETIC GROUPS, OR SCHOOL GROUPS?: NO

## 2025-05-22 SDOH — HEALTH STABILITY: PHYSICAL HEALTH
HOW OFTEN DO YOU NEED TO HAVE SOMEONE HELP YOU WHEN YOU READ INSTRUCTIONS, PAMPHLETS, OR OTHER WRITTEN MATERIAL FROM YOUR DOCTOR OR PHARMACY?: NEVER

## 2025-05-22 SDOH — HEALTH STABILITY: MENTAL HEALTH: HOW OFTEN DO YOU HAVE A DRINK CONTAINING ALCOHOL?: 4 OR MORE TIMES A WEEK

## 2025-05-22 SDOH — SOCIAL STABILITY: SOCIAL INSECURITY
WITHIN THE LAST YEAR, HAVE YOU BEEN RAPED OR FORCED TO HAVE ANY KIND OF SEXUAL ACTIVITY BY YOUR PARTNER OR EX-PARTNER?: NO

## 2025-05-22 SDOH — SOCIAL STABILITY: SOCIAL INSECURITY: ABUSE: ADULT

## 2025-05-22 SDOH — SOCIAL STABILITY: SOCIAL NETWORK: HOW OFTEN DO YOU ATTEND MEETINGS OF THE CLUBS OR ORGANIZATIONS YOU BELONG TO?: PATIENT DECLINED

## 2025-05-22 SDOH — HEALTH STABILITY: PHYSICAL HEALTH: ON AVERAGE, HOW MANY DAYS PER WEEK DO YOU ENGAGE IN MODERATE TO STRENUOUS EXERCISE (LIKE A BRISK WALK)?: 3 DAYS

## 2025-05-22 SDOH — SOCIAL STABILITY: SOCIAL INSECURITY: ARE YOU MARRIED, WIDOWED, DIVORCED, SEPARATED, NEVER MARRIED, OR LIVING WITH A PARTNER?: MARRIED

## 2025-05-22 SDOH — SOCIAL STABILITY: SOCIAL INSECURITY: ARE YOU OR HAVE YOU BEEN THREATENED OR ABUSED PHYSICALLY, EMOTIONALLY, OR SEXUALLY BY ANYONE?: NO

## 2025-05-22 SDOH — HEALTH STABILITY: MENTAL HEALTH: HOW OFTEN DO YOU HAVE SIX OR MORE DRINKS ON ONE OCCASION?: NEVER

## 2025-05-22 SDOH — SOCIAL STABILITY: SOCIAL INSECURITY: WITHIN THE LAST YEAR, HAVE YOU BEEN HUMILIATED OR EMOTIONALLY ABUSED IN OTHER WAYS BY YOUR PARTNER OR EX-PARTNER?: NO

## 2025-05-22 SDOH — HEALTH STABILITY: PHYSICAL HEALTH: ON AVERAGE, HOW MANY MINUTES DO YOU ENGAGE IN EXERCISE AT THIS LEVEL?: 30 MIN

## 2025-05-22 SDOH — ECONOMIC STABILITY: FOOD INSECURITY: WITHIN THE PAST 12 MONTHS, THE FOOD YOU BOUGHT JUST DIDN'T LAST AND YOU DIDN'T HAVE MONEY TO GET MORE.: NEVER TRUE

## 2025-05-22 SDOH — ECONOMIC STABILITY: HOUSING INSECURITY: IN THE PAST 12 MONTHS, HOW MANY TIMES HAVE YOU MOVED WHERE YOU WERE LIVING?: 0

## 2025-05-22 SDOH — ECONOMIC STABILITY: HOUSING INSECURITY: AT ANY TIME IN THE PAST 12 MONTHS, WERE YOU HOMELESS OR LIVING IN A SHELTER (INCLUDING NOW)?: NO

## 2025-05-22 SDOH — SOCIAL STABILITY: SOCIAL INSECURITY: DO YOU FEEL ANYONE HAS EXPLOITED OR TAKEN ADVANTAGE OF YOU FINANCIALLY OR OF YOUR PERSONAL PROPERTY?: NO

## 2025-05-22 SDOH — SOCIAL STABILITY: SOCIAL NETWORK: HOW OFTEN DO YOU GET TOGETHER WITH FRIENDS OR RELATIVES?: MORE THAN THREE TIMES A WEEK

## 2025-05-22 SDOH — HEALTH STABILITY: MENTAL HEALTH: HOW MANY DRINKS CONTAINING ALCOHOL DO YOU HAVE ON A TYPICAL DAY WHEN YOU ARE DRINKING?: 1 OR 2

## 2025-05-22 SDOH — ECONOMIC STABILITY: FOOD INSECURITY: HOW HARD IS IT FOR YOU TO PAY FOR THE VERY BASICS LIKE FOOD, HOUSING, MEDICAL CARE, AND HEATING?: NOT HARD AT ALL

## 2025-05-22 SDOH — SOCIAL STABILITY: SOCIAL INSECURITY
WITHIN THE LAST YEAR, HAVE YOU BEEN KICKED, HIT, SLAPPED, OR OTHERWISE PHYSICALLY HURT BY YOUR PARTNER OR EX-PARTNER?: NO

## 2025-05-22 SDOH — ECONOMIC STABILITY: HOUSING INSECURITY: DO YOU FEEL UNSAFE GOING BACK TO THE PLACE WHERE YOU LIVE?: NO

## 2025-05-22 SDOH — SOCIAL STABILITY: SOCIAL INSECURITY: ARE THERE ANY APPARENT SIGNS OF INJURIES/BEHAVIORS THAT COULD BE RELATED TO ABUSE/NEGLECT?: NO

## 2025-05-22 SDOH — ECONOMIC STABILITY: HOUSING INSECURITY: IN THE LAST 12 MONTHS, WAS THERE A TIME WHEN YOU WERE NOT ABLE TO PAY THE MORTGAGE OR RENT ON TIME?: NO

## 2025-05-22 SDOH — SOCIAL STABILITY: SOCIAL NETWORK: HOW OFTEN DO YOU ATTEND CHURCH OR RELIGIOUS SERVICES?: NEVER

## 2025-05-22 SDOH — SOCIAL STABILITY: SOCIAL INSECURITY: DOES ANYONE TRY TO KEEP YOU FROM HAVING/CONTACTING OTHER FRIENDS OR DOING THINGS OUTSIDE YOUR HOME?: NO

## 2025-05-22 SDOH — SOCIAL STABILITY: SOCIAL INSECURITY: HAVE YOU HAD THOUGHTS OF HARMING ANYONE ELSE?: NO

## 2025-05-22 SDOH — ECONOMIC STABILITY: TRANSPORTATION INSECURITY: IN THE PAST 12 MONTHS, HAS LACK OF TRANSPORTATION KEPT YOU FROM MEDICAL APPOINTMENTS OR FROM GETTING MEDICATIONS?: NO

## 2025-05-22 ASSESSMENT — PAIN - FUNCTIONAL ASSESSMENT
PAIN_FUNCTIONAL_ASSESSMENT: 0-10
PAIN_FUNCTIONAL_ASSESSMENT: 0-10
PAIN_FUNCTIONAL_ASSESSMENT: UNABLE TO SELF-REPORT
PAIN_FUNCTIONAL_ASSESSMENT: 0-10
PAIN_FUNCTIONAL_ASSESSMENT: UNABLE TO SELF-REPORT
PAIN_FUNCTIONAL_ASSESSMENT: 0-10

## 2025-05-22 ASSESSMENT — PAIN SCALES - GENERAL
PAINLEVEL_OUTOF10: 8
PAINLEVEL_OUTOF10: 3
PAINLEVEL_OUTOF10: 7
PAINLEVEL_OUTOF10: 8
PAINLEVEL_OUTOF10: 3
PAINLEVEL_OUTOF10: 8
PAINLEVEL_OUTOF10: 8
PAINLEVEL_OUTOF10: 0 - NO PAIN
PAINLEVEL_OUTOF10: 7
PAINLEVEL_OUTOF10: 8

## 2025-05-22 ASSESSMENT — LIFESTYLE VARIABLES
PRESCIPTION_ABUSE_PAST_12_MONTHS: NO
SKIP TO QUESTIONS 9-10: 1
AUDIT-C TOTAL SCORE: 4
HOW OFTEN DURING THE LAST YEAR HAVE YOU FOUND THAT YOU WERE NOT ABLE TO STOP DRINKING ONCE YOU HAD STARTED: NEVER
HOW OFTEN DURING THE LAST YEAR HAVE YOU BEEN UNABLE TO REMEMBER WHAT HAPPENED THE NIGHT BEFORE BECAUSE YOU HAD BEEN DRINKING: NEVER
HAVE YOU OR SOMEONE ELSE BEEN INJURED AS A RESULT OF YOUR DRINKING: NO
SUBSTANCE_ABUSE_PAST_12_MONTHS: YES
AUDIT TOTAL SCORE: 4
AUDIT TOTAL SCORE: 0
HOW MANY STANDARD DRINKS CONTAINING ALCOHOL DO YOU HAVE ON A TYPICAL DAY: 1 OR 2
HOW OFTEN DURING THE LAST YEAR HAVE YOU HAD A FEELING OF GUILT OR REMORSE AFTER DRINKING: NEVER
HOW OFTEN DO YOU HAVE 6 OR MORE DRINKS ON ONE OCCASION: NEVER
HOW OFTEN DURING THE LAST YEAR HAVE YOU FAILED TO DO WHAT WAS NORMALLY EXPECTED FROM YOU BECAUSE OF DRINKING: NEVER
HAS A RELATIVE, FRIEND, DOCTOR, OR ANOTHER HEALTH PROFESSIONAL EXPRESSED CONCERN ABOUT YOUR DRINKING OR SUGGESTED YOU CUT DOWN: NO
HOW OFTEN DO YOU HAVE A DRINK CONTAINING ALCOHOL: 4 OR MORE TIMES A WEEK
SKIP TO QUESTIONS 9-10: 1
AUDIT-C TOTAL SCORE: 4
AUDIT-C TOTAL SCORE: 4
HOW OFTEN DURING THE LAST YEAR HAVE YOU NEEDED AN ALCOHOLIC DRINK FIRST THING IN THE MORNING TO GET YOURSELF GOING AFTER A NIGHT OF HEAVY DRINKING: NEVER

## 2025-05-22 ASSESSMENT — ACTIVITIES OF DAILY LIVING (ADL)
HEARING - RIGHT EAR: FUNCTIONAL
DRESSING YOURSELF: INDEPENDENT
FEEDING YOURSELF: INDEPENDENT
TOILETING: INDEPENDENT
WALKS IN HOME: INDEPENDENT
BATHING: INDEPENDENT
ADEQUATE_TO_COMPLETE_ADL: YES
LACK_OF_TRANSPORTATION: NO
GROOMING: INDEPENDENT
JUDGMENT_ADEQUATE_SAFELY_COMPLETE_DAILY_ACTIVITIES: YES
HEARING - LEFT EAR: FUNCTIONAL
PATIENT'S MEMORY ADEQUATE TO SAFELY COMPLETE DAILY ACTIVITIES?: YES

## 2025-05-22 ASSESSMENT — COGNITIVE AND FUNCTIONAL STATUS - GENERAL
DAILY ACTIVITIY SCORE: 24
PATIENT BASELINE BEDBOUND: NO
MOBILITY SCORE: 24

## 2025-05-22 ASSESSMENT — PATIENT HEALTH QUESTIONNAIRE - PHQ9
1. LITTLE INTEREST OR PLEASURE IN DOING THINGS: NOT AT ALL
2. FEELING DOWN, DEPRESSED OR HOPELESS: NOT AT ALL
SUM OF ALL RESPONSES TO PHQ9 QUESTIONS 1 & 2: 0

## 2025-05-22 ASSESSMENT — PAIN DESCRIPTION - LOCATION
LOCATION: THROAT
LOCATION: THROAT

## 2025-05-22 ASSESSMENT — COLUMBIA-SUICIDE SEVERITY RATING SCALE - C-SSRS
6. HAVE YOU EVER DONE ANYTHING, STARTED TO DO ANYTHING, OR PREPARED TO DO ANYTHING TO END YOUR LIFE?: NO
1. IN THE PAST MONTH, HAVE YOU WISHED YOU WERE DEAD OR WISHED YOU COULD GO TO SLEEP AND NOT WAKE UP?: NO
2. HAVE YOU ACTUALLY HAD ANY THOUGHTS OF KILLING YOURSELF?: NO

## 2025-05-22 NOTE — CARE PLAN
The patient's goals for the shift include      The clinical goals for the shift include rest and pain free    Over the shift, the patient did not make progress toward the following goals. Barriers to progression include recent procedure. Recommendations to address these barriers include medication administration.

## 2025-05-22 NOTE — OP NOTE
ENT Operative Note     Date: 2025  OR Location: [facility] OR    Name: Rosendo Delacruz, : 1965, Age: 59 y.o., MRN: [id number], Sex: male    Diagnosis  Pre-op Diagnosis      * Zenker's diverticulum [K22.5]     * Dysphagia, unspecified type [R13.10] Post-op Diagnosis     * Zenker's diverticulum [K22.5]     * Dysphagia, unspecified type [R13.10]     Procedures  ENDOSCOPIC ZENKER'S DIVERTICULOTOMY WITH CO2 LASER, FLEXIBLE ESOPHAGOSCOPY, DOBHOFF PLACEMENT  96429 - WI ESOPHAGOSCP RIG TRANSORAL HYPOPHARYNX CRV ESOPH  24392 - WI ESOPHAGOSCOPY FLEXIBLE TRANSORAL DIAGNOSTIC - ESOPHAGOSCOPY    Surgeons      * Marcelle White - Primary    Resident/Fellow/Other Assistant:  Surgeons and Role:     * Anastasia Amaya MD - Resident - Assisting    Staff:   Circulator: Fadia Rose Person: Any    Anesthesia Staff: Anesthesiologist: Ekta Lopes MD  CRNA: CHEY Lopez-CRNA  SRNA: Ryland Tate RN    Procedure Summary  Anesthesia: General  ASA: III  Estimated Blood Loss: 5mL  Intra-op Medications:   Administrations occurring from 0655 to 0930 on 25:   Medication Name Total Dose   sodium chloride 0.9 % irrigation solution 1,000 mL   EPINEPHrine HCl (PF) (Adrenalin) injection 4 mg   fluorescein 1 mg ophthalmic strip 1 strip   surgical lubricant gel 1 Application   ampicillin-sulbactam (Unasyn) vial 3 g 3 g   dexAMETHasone (Decadron) 4 mg/mL IV Syringe 2 mL 4 mg   dexMEDETOMidine 4 mcg/mL in NS syringe 8 mcg   fentaNYL (Sublimaze) injection 50 mcg/mL 100 mcg   glycopyrrolate (Robinul) injection 0.2 mg   LR bolus Cannot be calculated   lidocaine (Xylocaine) injection 2 % 100 mg   midazolam PF (Versed) injection 1 mg/mL 2 mg   propofol (Diprivan) injection 10 mg/mL 200 mg   remifentanil (Ultiva) 1,000 mcg in sodium chloride 0.9% 50 mL (20 mcg/mL) infusion 0.48 mg   remifentanil (Ultiva) injection 40 mcg   rocuronium (ZeMuron) 50 mg/5 mL injection 70 mg   succinylcholine (Anectine) 20 mg/mL injection 120  mg              Anesthesia Record               Intraprocedure I/O Totals          Intake    LR bolus 1000.00 mL    Remifentanil Drip 0.00 mL    The total shown is the total volume documented since Anesthesia Start was filed.    Total Intake 1000 mL          Specimen:   ID Type Source Tests Collected by Time   1 : CRICOPHARYNGEUS MUSCLE Tissue SOFT TISSUE RESECTION SURGICAL PATHOLOGY EXAM Marcelle White MD 5/22/2025 0853   2 : ZENKER'S DIVERTICULUM Tissue SOFT TISSUE RESECTION SURGICAL PATHOLOGY EXAM Marcelle White MD 5/22/2025 0946        Drains and/or Catheters:   NG/OG/Feeding Tube  12 Fr Right nostril (Active)   Present on Admission to Healthcare Facility N 05/22/25 1045   Tube Status Continuous tube feeding 05/22/25 2200   Placement Verification X-ray 05/22/25 2200   Site Assessment Clean;Dry;Intact 05/22/25 2200   Irrigant Tap water 05/22/25 2200   Response To Intervention No resistance met 05/22/25 2200   Tube Securement Nasal bridle 05/22/25 2200   Intake (mL) 40 mL 05/22/25 2200   Intake - Flush (mL) 60 mL 05/22/25 2200       Findings:   Flexible esophagoscopy performed to rule out additional anatomic abrnomalities or mucosal lesions given history of dysphagia  Exposure with Weerda limited by mandibular anatomy, ultimately larger [facility] diverticuloscope used for exposure  2cm hypopharyngeal diverticulum with impacted food, endoscopic myectomy and partial diverticulectomy performed    Indications: Rosendo Delacruz is an 59 y.o. male who is having surgery for Zenker's diverticulum [K22.5].     The patient was seen in the preoperative area. The risks, benefits, complications, treatment options, non-operative alternatives, expected recovery and outcomes were discussed with the patient. The possibilities of reaction to medication, pulmonary aspiration, injury to surrounding structures, bleeding, recurrent infection, the need for additional procedures, failure to diagnose a condition, and creating a complication  requiring transfusion or operation were discussed with the patient. The patient concurred with the proposed plan, giving informed consent.  The site of surgery was properly noted/marked if necessary per policy. The patient has been actively warmed in preoperative area. Preoperative antibiotics have been ordered and given within 1 hours of incision. Venous thrombosis prophylaxis have been ordered including bilateral sequential compression devices    Procedure Details:   The patient was taken to the operative suite and transferred to the operating table and laid supine. A pre-operative timeout was performed with all participating parties. General anesthesia was induced, and the patient was intubated with a 6-0 microlaryngeal tube. A dental guard was used to protect the patient's teeth. A final timeout was completed and we began with our procedure.     We first began with the flexible esophagogastroscopy. The gastroscope was placed through the oral cavity and passed through the upper esophageal sphincter into the cervical esophagus. The following findings were noted:    1) Laryngopharynx: ETT in place  2) Pharyngoesophageal segment: narrowed with prominent CP and pouch   3) Cervical esophagus: normal  4) Gastroesophageal junction: normal  5) Gastric cardia and fundus: normal  6) Gastric body and antrum: normal    The gastroscope was removed. We then proceeded with the endoscopic Zenker's diverticulectomy. First the Weerda was attempted but exposure was limited due to patient anatomy. Next the Dohlman diverticuloscope was then introduced to the mouth and the cricopharyngeal bar was exposed. The diverticuloscope was placed in suspension. Photos were taken with a 0 degree telescope. The microscope was then brought in. Laser safety time-out was performed.  We ensured that all personnel had appropriate eye protection, the patient had wet towels and eye protection over the patient's face, FiO2 was below 30%, and there was a  syringe full of saline in the room.  We then proceeded to use the CO2 laser to longitudinally incise the mucosa over the cricopharyngeus muscle on at setting of 6W superpulse. The muscle was exposed and mucosal flaps were elevated above the muscle on the esophageal side and below along the diverticular side.    Next a wedge of the cricopharyngeus muscle was excised using a combination of electrocautery and the CO2 laser. The buccopharyngeal fascia was identified at the deep aspect and preserved. Hemostasis was achieved using the CO2 laser, epinephrine pledgets, and suction monopolar cautery. Additional muscle adherent to the submucosa was further ablated and excised.     At this point in the procedure the muscle was determined to be divided significantly enough so that the pouch was well-integrated with the native esophagus. Excess mucosa on the diverticular side was trimmed with the CO2 laser to reduce the size of the pouch. Moderate bleeding was encountered due to inflamed pouch mucosa. Next the mucosal edges were reapproximated with barbeed suture. The suture line was then reinforced with fibrin glue. Additional photos were taken with a 0 degree telescope. An NGFT was placed through the naris and visualized as it passed into the esophagus. The scope was then removed as well as the moldable tooth guard. The patient was then returned to the care of our anesthesia colleagues for an uncomplicated wakeup.    Rationale for 22 modifier: addition of flexible esophagoscopy for full esophageal evaluation prior to Zenker's repair increasing services. Added complexity of procedure with partial endoscopic myectomy and endoscopic diverticulectomy.     Complications:  None; patient tolerated the procedure well.    Disposition: PACU - hemodynamically stable.  Condition: stable     Attending Attestation: I was present and scrubbed for the entirety of the procedure.    Pre/Post      Marcelle White MD, MAEd  Assistant    [facility] [employer] [facility] [employer]  [facility]  Department of Otolaryngology - Head and Neck Surgery  [facility]

## 2025-05-22 NOTE — CARE PLAN
The patient's goals for the shift include      The clinical goals for the shift include pain free      Problem: Pain - Adult  Goal: Verbalizes/displays adequate comfort level or baseline comfort level  Outcome: Progressing     Problem: Safety - Adult  Goal: Free from fall injury  Outcome: Progressing     Problem: Discharge Planning  Goal: Discharge to home or other facility with appropriate resources  Outcome: Progressing     Problem: Chronic Conditions and Co-morbidities  Goal: Patient's chronic conditions and co-morbidity symptoms are monitored and maintained or improved  Outcome: Progressing     Problem: Nutrition  Goal: Nutrient intake appropriate for maintaining nutritional needs  Outcome: Progressing     Problem: Fall/Injury  Goal: Not fall by end of shift  Outcome: Progressing  Goal: Be free from injury by end of the shift  Outcome: Progressing  Goal: Verbalize understanding of personal risk factors for fall in the hospital  Outcome: Progressing  Goal: Verbalize understanding of risk factor reduction measures to prevent injury from fall in the home  Outcome: Progressing  Goal: Pace activities to prevent fatigue by end of the shift  Outcome: Progressing     Problem: Pain  Goal: Takes deep breaths with improved pain control throughout the shift  Outcome: Progressing  Goal: Turns in bed with improved pain control throughout the shift  Outcome: Progressing  Goal: Walks with improved pain control throughout the shift  Outcome: Progressing  Goal: Performs ADL's with improved pain control throughout shift  Outcome: Progressing  Goal: Free from opioid side effects throughout the shift  Outcome: Progressing  Goal: Free from acute confusion related to pain meds throughout the shift  Outcome: Progressing

## 2025-05-22 NOTE — ANESTHESIA PREPROCEDURE EVALUATION
Patient: Rosendo Delacruz    Procedure Information       Date/Time: 05/22/25 0655    Procedure: ENDOSCOPIC ZENKER'S DIVERTICULOTOMY WITH CO2 LASER, POSSIBLE FLEXIBLE ESOPHAGOSCOPY, POSSIBLE DILATION AND BOTOX INJECTION    Location: [facility] OR 05 / [facility] OR    Surgeons: Marcelle White MD            Relevant Problems   Anesthesia (within normal limits)      Cardiac  Denies formal diagnosis of  hypertension but does state his blood pressure has been steadily increasing and will follow up with PCP    A.fib s/p ablation    Denies CP  METS >4   (+) HTN (hypertension)   (+) Persistent atrial fibrillation (Multi)   (-) Chest pain      GI   (+) Other dysphagia      Musculoskeletal   (+) Chronic bilateral low back pain without sciatica   (+) Generalized osteoarthritis       Clinical information reviewed:   Tobacco  Allergies  Meds   Med Hx  Surg Hx   Fam Hx  Soc Hx        NPO Detail:  NPO/Void Status  Carbohydrate Drink Given Prior to Surgery? : N  Date of Last Liquid: 05/21/25  Time of Last Liquid: 1700  Date of Last Solid: 05/21/25  Time of Last Solid: 1900  Last Intake Type: Food         Physical Exam    Airway  Mallampati: II  TM distance: >3 FB  Neck ROM: full  Mouth opening: 3 or more finger widths     Cardiovascular   Rhythm: regular  Rate: normal     Dental    Pulmonary Breath sounds clear to auscultation     Abdominal        Results for orders placed during the hospital encounter of 05/19/25    Transthoracic Echo (TTE) Complete    [facility] [place], [address] [place] [address]  Tel [phone] and Fax [phone]    TRANSTHORACIC ECHOCARDIOGRAM REPORT      Patient Name:       ROSENDO DELACRUZ     Reading Physician:    [id number] Roly Miramontes MD  Study Date:         5/19/2025           Ordering Provider:    [id number] ADELINE ANDERSON  MRN/PID:            [id number]            Fellow:  Accession#:         [id number]        Nurse:  Date of Birth/Age:  1965 / 59 years  Sonographer:          ISABEL Bañuelos RDCS  Gender assigned at  M                   Additional Staff:  Birth:  Height:             182.88 cm           Admit Date:  Weight:             89.36 kg            Admission Status:     Outpatient  BSA / BMI:          2.12 m2 / 26.72     Encounter#:           [id number]  kg/m2  Blood Pressure:     161/86 mmHg         Department Location:  [facility]    Study Type:    TRANSTHORACIC ECHO (TTE) COMPLETE  Diagnosis/ICD: Encounter for other preprocedural examination-Z01.818  Indication:    Pre-Op, history of global hypokinesis  CPT Code:      Echo Complete w Full Doppler-01352    Patient History:  Pertinent History: Chest Pain and HTN. ETOH, A-fib s/p ablation 2018, [facility].    Study Detail: The following Echo studies were performed: 2D, M-Mode, Doppler and  color flow.      PHYSICIAN INTERPRETATION:  Left Ventricle: The left ventricular systolic function is normal, with a visually estimated ejection fraction of 55-60%. There are no regional left ventricular wall motion abnormalities. The left ventricular cavity size is normal. There is mildly increased septal and mildly increased posterior left ventricular wall thickness. There is left ventricular concentric remodeling. Spectral Doppler shows a normal pattern of left ventricular diastolic filling.  Left Atrium: The left atrial size is normal.  Right Ventricle: The right ventricle is upper limits of normal in size. There is normal right ventricular global systolic function.  Right Atrium: The right atrium is upper limits of normal in size.  Aortic Valve: The aortic valve is trileaflet. There is no evidence of aortic valve regurgitation. The peak instantaneous gradient of the aortic valve is 6 mmHg.  Mitral Valve: The mitral valve is normal in structure. There is trace mitral valve regurgitation.  Tricuspid Valve: The tricuspid valve is structurally normal. There is trace tricuspid regurgitation. The right ventricular  systolic pressure is unable to be estimated.  Pulmonic Valve: The pulmonic valve is structurally normal. There is mild pulmonic valve regurgitation.  Pericardium: Trivial pericardial effusion.  Aorta: The aortic root is normal.  Pulmonary Artery: The pulmonary artery is not well visualized.  Systemic Veins: The inferior vena cava appears normal in size, with IVC inspiratory collapse less than 50%.  In comparison to the previous echocardiogram(s): Compared with study dated 11/20/2018,. LV EF improved to 55-60% from prior study 50%. LV diastolic function has normalized.      CONCLUSIONS:  1. The left ventricular systolic function is normal, with a visually estimated ejection fraction of 55-60%.  2. There is normal right ventricular global systolic function.  3. The pulmonary artery is not well visualized.    QUANTITATIVE DATA SUMMARY:    2D MEASUREMENTS:          Normal Ranges:  Ao Root d:       3.50 cm  (2.0-3.7cm)  LAs:             3.67 cm  (2.7-4.0cm)  IVSd:            1.06 cm  (0.6-1.1cm)  LVPWd:           1.05 cm  (0.6-1.1cm)  LVIDd:           4.87 cm  (3.9-5.9cm)  LVIDs:           3.36 cm  LV Mass Index:   89 g/m2  LVEDV Index:     60 ml/m2  LV % FS          31.0 %      LEFT ATRIUM:                  Normal Ranges:  LA Vol A4C:        50.9 ml    (22+/-6mL/m2)  LA Vol A2C:        55.1 ml  LA Vol BP:         52.9 ml  LA Vol Index A4C:  24.0ml/m2  LA Vol Index A2C:  26.0 ml/m2  LA Vol Index BP:   25.0 ml/m2  LA Area A4C:       17.3 cm2  LA Area A2C:       18.0 cm2  LA Major Axis A4C: 5.0 cm  LA Major Axis A2C: 5.0 cm  LA Volume Index:   25.0 ml/m2  LA Vol A4C:        47.3 ml  LA Vol A2C:        54.1 ml  LA Vol Index BSA:  23.9 ml/m2      RIGHT ATRIUM:                 Normal Ranges:  RA Vol A4C:        49.5 ml    (8.3-19.5ml)  RA Vol Index A4C:  23.4 ml/m2  RA Area A4C:       17.4 cm2  RA Major Axis A4C: 5.2 cm      M-MODE MEASUREMENTS:         Normal Ranges:  Ao Root:             3.50 cm (2.0-3.7cm)      AORTA  MEASUREMENTS:         Normal Ranges:  Asc Ao, d:          3.40 cm (2.1-3.4cm)      LV SYSTOLIC FUNCTION:  Normal Ranges:  EF-A4C View:    51 % (>=55%)  EF-A2C View:    64 %  EF-Biplane:     57 %  EF-Visual:      58 %  LV EF Reported: 58 %      LV DIASTOLIC FUNCTION:             Normal Ranges:  MV Peak E:             0.86 m/s    (0.7-1.2 m/s)  MV Peak A:             0.47 m/s    (0.42-0.7 m/s)  E/A Ratio:             1.82        (1.0-2.2)  MV e'                  0.090 m/s   (>8.0)  MV lateral e'          0.10 m/s  MV medial e'           0.08 m/s  MV A Dur:              133.79 msec  E/e' Ratio:            9.57        (<8.0)  MV DT:                 216 msec    (150-240 msec)      MITRAL VALVE:          Normal Ranges:  MV DT:        216 msec (150-240msec)      AORTIC VALVE:           Normal Ranges:  AoV Vmax:      1.26 m/s (<=1.7m/s)  AoV Peak P.4 mmHg (<20mmHg)  LVOT Max Delroy:  1.18 m/s (<=1.1m/s)  LVOT VTI:      22.97 cm  LVOT Diameter: 2.53 cm  (1.8-2.4cm)  AoV Area,Vmax: 4.69 cm2 (2.5-4.5cm2)      RIGHT VENTRICLE:  RV Basal 5.00 cm  RV Mid   3.20 cm  RV Major 7.5 cm  TAPSE:   25.0 mm  RV s'    0.14 m/s      TRICUSPID VALVE/RVSP:         Normal Ranges:  Est. RA Pressure:     8 mmHg  IVC Diam:             2.00 cm      PULMONIC VALVE:          Normal Ranges:  PV Max Delroy:     1.5 m/s  (0.6-0.9m/s)  PV Max P.2 mmHg  PV Mean P.3 mmHg  PV VTI:         27.95 cm    Anesthesia Plan    History of general anesthesia?: yes  History of complications of general anesthesia?: no    ASA 3     general   (GA ETT (6.0 MLT per surgeon))  intravenous induction   Anesthetic plan and risks discussed with patient and spouse.  Use of blood products discussed with patient who consented to blood products.

## 2025-05-22 NOTE — ANESTHESIA POSTPROCEDURE EVALUATION
Patient: Rosendo Delacruz    Procedure Summary       Date: 05/22/25 Room / Location: [facility] OR 05 / [facility] OR    Anesthesia Start: 0718 Anesthesia Stop: 1044    Procedure: ENDOSCOPIC ZENKER'S DIVERTICULOTOMY WITH CO2 LASER, FLEXIBLE ESOPHAGOSCOPY, DOBHOFF PLACEMENT (Throat) Diagnosis:       Zenker's diverticulum      (Zenker's diverticulum [K22.5])    Surgeons: Marcelle White MD Responsible Provider: Ekta Lopes MD    Anesthesia Type: general ASA Status: 3            Anesthesia Type: general    Vitals Value Taken Time   /93 05/22/25 11:15   Temp 36.3 °C (97.3 °F) 05/22/25 10:45   Pulse 54 05/22/25 11:15   Resp 11 05/22/25 11:15   SpO2 94 % 05/22/25 11:15       Anesthesia Post Evaluation    Patient location during evaluation: PACU  Patient participation: complete - patient participated  Level of consciousness: awake and alert  Pain management: satisfactory to patient  Airway patency: patent  Cardiovascular status: acceptable  Respiratory status: acceptable  Hydration status: acceptable  Postoperative Nausea and Vomiting: none        No notable events documented.

## 2025-05-22 NOTE — ANESTHESIA PROCEDURE NOTES
Airway  Date/Time: 5/22/2025 7:33 AM  Reason: elective    Airway not difficult    Staffing  Performed: SRNA   Authorized by: Ekta Lopes MD    Performed by: JEFFREY Lopez  Patient location during procedure: OR    Patient Condition  Indications for airway management: anesthesia  Patient position: sniffing  Sedation level: deep     Final Airway Details   Preoxygenated: yes  Final airway type: endotracheal airway  Successful airway: ETT  Cuffed: yes   Successful intubation technique: video laryngoscopy  Endotracheal tube insertion site: oral  Blade size: #4  ETT size (mm): 6.0  Cormack-Lehane Classification: grade I - full view of glottis  Placement verified by: chest auscultation and capnometry   Measured from: lips  Number of attempts at approach: 1    Additional Comments  RSI

## 2025-05-23 ENCOUNTER — APPOINTMENT (OUTPATIENT)
Dept: RADIOLOGY | Facility: HOSPITAL | Age: 60
DRG: 982 | End: 2025-05-23
Payer: COMMERCIAL

## 2025-05-23 LAB
ALBUMIN SERPL BCP-MCNC: 3.8 G/DL (ref 3.4–5)
ANION GAP SERPL CALC-SCNC: 12 MMOL/L (ref 10–20)
BUN SERPL-MCNC: 12 MG/DL (ref 6–23)
CALCIUM SERPL-MCNC: 8.7 MG/DL (ref 8.6–10.6)
CHLORIDE SERPL-SCNC: 104 MMOL/L (ref 98–107)
CO2 SERPL-SCNC: 27 MMOL/L (ref 21–32)
CREAT SERPL-MCNC: 0.91 MG/DL (ref 0.5–1.3)
EGFRCR SERPLBLD CKD-EPI 2021: >90 ML/MIN/1.73M*2
ERYTHROCYTE [DISTWIDTH] IN BLOOD BY AUTOMATED COUNT: 12.8 % (ref 11.5–14.5)
GLUCOSE SERPL-MCNC: 130 MG/DL (ref 74–99)
HCT VFR BLD AUTO: 39.5 % (ref 41–52)
HGB BLD-MCNC: 13.5 G/DL (ref 13.5–17.5)
MAGNESIUM SERPL-MCNC: 1.78 MG/DL (ref 1.6–2.4)
MCH RBC QN AUTO: 30.5 PG (ref 26–34)
MCHC RBC AUTO-ENTMCNC: 34.2 G/DL (ref 32–36)
MCV RBC AUTO: 89 FL (ref 80–100)
NRBC BLD-RTO: 0 /100 WBCS (ref 0–0)
PHOSPHATE SERPL-MCNC: 3.2 MG/DL (ref 2.5–4.9)
PLATELET # BLD AUTO: 255 X10*3/UL (ref 150–450)
POTASSIUM SERPL-SCNC: 3.5 MMOL/L (ref 3.5–5.3)
RBC # BLD AUTO: 4.42 X10*6/UL (ref 4.5–5.9)
SODIUM SERPL-SCNC: 139 MMOL/L (ref 136–145)
WBC # BLD AUTO: 17.2 X10*3/UL (ref 4.4–11.3)

## 2025-05-23 PROCEDURE — 83735 ASSAY OF MAGNESIUM: CPT | Performed by: STUDENT IN AN ORGANIZED HEALTH CARE EDUCATION/TRAINING PROGRAM

## 2025-05-23 PROCEDURE — 36415 COLL VENOUS BLD VENIPUNCTURE: CPT | Performed by: STUDENT IN AN ORGANIZED HEALTH CARE EDUCATION/TRAINING PROGRAM

## 2025-05-23 PROCEDURE — 96372 THER/PROPH/DIAG INJ SC/IM: CPT | Performed by: STUDENT IN AN ORGANIZED HEALTH CARE EDUCATION/TRAINING PROGRAM

## 2025-05-23 PROCEDURE — 85027 COMPLETE CBC AUTOMATED: CPT | Performed by: STUDENT IN AN ORGANIZED HEALTH CARE EDUCATION/TRAINING PROGRAM

## 2025-05-23 PROCEDURE — 71045 X-RAY EXAM CHEST 1 VIEW: CPT

## 2025-05-23 PROCEDURE — 2500000004 HC RX 250 GENERAL PHARMACY W/ HCPCS (ALT 636 FOR OP/ED): Mod: JZ

## 2025-05-23 PROCEDURE — 7100000011 HC EXTENDED STAY RECOVERY HOURLY - NURSING UNIT

## 2025-05-23 PROCEDURE — 1170000001 HC PRIVATE ONCOLOGY ROOM DAILY

## 2025-05-23 PROCEDURE — 2500000004 HC RX 250 GENERAL PHARMACY W/ HCPCS (ALT 636 FOR OP/ED): Mod: JZ | Performed by: STUDENT IN AN ORGANIZED HEALTH CARE EDUCATION/TRAINING PROGRAM

## 2025-05-23 PROCEDURE — 2500000001 HC RX 250 WO HCPCS SELF ADMINISTERED DRUGS (ALT 637 FOR MEDICARE OP): Performed by: STUDENT IN AN ORGANIZED HEALTH CARE EDUCATION/TRAINING PROGRAM

## 2025-05-23 PROCEDURE — 82565 ASSAY OF CREATININE: CPT | Performed by: STUDENT IN AN ORGANIZED HEALTH CARE EDUCATION/TRAINING PROGRAM

## 2025-05-23 PROCEDURE — 2500000001 HC RX 250 WO HCPCS SELF ADMINISTERED DRUGS (ALT 637 FOR MEDICARE OP)

## 2025-05-23 PROCEDURE — 71045 X-RAY EXAM CHEST 1 VIEW: CPT | Performed by: RADIOLOGY

## 2025-05-23 PROCEDURE — 2500000004 HC RX 250 GENERAL PHARMACY W/ HCPCS (ALT 636 FOR OP/ED)

## 2025-05-23 RX ORDER — HYDROMORPHONE HYDROCHLORIDE 1 MG/ML
0.2 INJECTION, SOLUTION INTRAMUSCULAR; INTRAVENOUS; SUBCUTANEOUS
Status: DISCONTINUED | OUTPATIENT
Start: 2025-05-23 | End: 2025-05-28 | Stop reason: HOSPADM

## 2025-05-23 RX ORDER — OXYCODONE HCL 5 MG/5 ML
10 SOLUTION, ORAL ORAL EVERY 4 HOURS PRN
Refills: 0 | Status: DISCONTINUED | OUTPATIENT
Start: 2025-05-23 | End: 2025-05-28 | Stop reason: HOSPADM

## 2025-05-23 RX ORDER — SODIUM CHLORIDE, SODIUM LACTATE, POTASSIUM CHLORIDE, CALCIUM CHLORIDE 600; 310; 30; 20 MG/100ML; MG/100ML; MG/100ML; MG/100ML
100 INJECTION, SOLUTION INTRAVENOUS CONTINUOUS
Status: DISCONTINUED | OUTPATIENT
Start: 2025-05-23 | End: 2025-05-24

## 2025-05-23 RX ADMIN — OXYCODONE HYDROCHLORIDE 10 MG: 5 SOLUTION ORAL at 21:39

## 2025-05-23 RX ADMIN — ENOXAPARIN SODIUM 40 MG: 100 INJECTION SUBCUTANEOUS at 20:05

## 2025-05-23 RX ADMIN — HYDROMORPHONE HYDROCHLORIDE 0.2 MG: 1 INJECTION, SOLUTION INTRAMUSCULAR; INTRAVENOUS; SUBCUTANEOUS at 20:06

## 2025-05-23 RX ADMIN — SODIUM CHLORIDE, SODIUM LACTATE, POTASSIUM CHLORIDE, AND CALCIUM CHLORIDE 100 ML/HR: .6; .31; .03; .02 INJECTION, SOLUTION INTRAVENOUS at 16:30

## 2025-05-23 RX ADMIN — AMPICILLIN SODIUM AND SULBACTAM SODIUM 3 G: 2; 1 INJECTION, POWDER, FOR SOLUTION INTRAMUSCULAR; INTRAVENOUS at 20:06

## 2025-05-23 RX ADMIN — DOCUSATE SODIUM 100 MG: 50 LIQUID ORAL at 08:29

## 2025-05-23 RX ADMIN — ACETAMINOPHEN 1000 MG: 160 SOLUTION ORAL at 21:39

## 2025-05-23 RX ADMIN — ESOMEPRAZOLE MAGNESIUM 40 MG: 40 FOR SUSPENSION ORAL at 08:30

## 2025-05-23 RX ADMIN — ACETAMINOPHEN 1000 MG: 160 SOLUTION ORAL at 04:03

## 2025-05-23 RX ADMIN — ACETAMINOPHEN 1000 MG: 160 SOLUTION ORAL at 14:24

## 2025-05-23 RX ADMIN — OXYCODONE HYDROCHLORIDE 5 MG: 5 SOLUTION ORAL at 08:28

## 2025-05-23 RX ADMIN — OXYCODONE HYDROCHLORIDE 10 MG: 5 SOLUTION ORAL at 04:04

## 2025-05-23 RX ADMIN — SODIUM CHLORIDE, SODIUM LACTATE, POTASSIUM CHLORIDE, AND CALCIUM CHLORIDE 100 ML/HR: .6; .31; .03; .02 INJECTION, SOLUTION INTRAVENOUS at 11:20

## 2025-05-23 RX ADMIN — AMPICILLIN SODIUM AND SULBACTAM SODIUM 3 G: 2; 1 INJECTION, POWDER, FOR SOLUTION INTRAMUSCULAR; INTRAVENOUS at 01:27

## 2025-05-23 RX ADMIN — AMPICILLIN SODIUM AND SULBACTAM SODIUM 3 G: 2; 1 INJECTION, POWDER, FOR SOLUTION INTRAMUSCULAR; INTRAVENOUS at 14:25

## 2025-05-23 RX ADMIN — HYDROMORPHONE HYDROCHLORIDE 0.2 MG: 1 INJECTION, SOLUTION INTRAMUSCULAR; INTRAVENOUS; SUBCUTANEOUS at 01:24

## 2025-05-23 RX ADMIN — OXYCODONE HYDROCHLORIDE 5 MG: 5 SOLUTION ORAL at 12:58

## 2025-05-23 RX ADMIN — AMPICILLIN SODIUM AND SULBACTAM SODIUM 3 G: 2; 1 INJECTION, POWDER, FOR SOLUTION INTRAMUSCULAR; INTRAVENOUS at 08:29

## 2025-05-23 ASSESSMENT — COGNITIVE AND FUNCTIONAL STATUS - GENERAL
DAILY ACTIVITIY SCORE: 24
MOBILITY SCORE: 24
MOBILITY SCORE: 24
DAILY ACTIVITIY SCORE: 24

## 2025-05-23 ASSESSMENT — ACTIVITIES OF DAILY LIVING (ADL): LACK_OF_TRANSPORTATION: NO

## 2025-05-23 ASSESSMENT — PAIN DESCRIPTION - LOCATION
LOCATION: THROAT

## 2025-05-23 ASSESSMENT — PAIN SCALES - GENERAL
PAINLEVEL_OUTOF10: 3
PAINLEVEL_OUTOF10: 5 - MODERATE PAIN
PAINLEVEL_OUTOF10: 5 - MODERATE PAIN

## 2025-05-23 ASSESSMENT — PAIN - FUNCTIONAL ASSESSMENT
PAIN_FUNCTIONAL_ASSESSMENT: 0-10

## 2025-05-23 ASSESSMENT — PAIN DESCRIPTION - DESCRIPTORS: DESCRIPTORS: ACHING

## 2025-05-23 NOTE — PROGRESS NOTES
ENT DAILY PROGRESS NOTE  Name: Rosendo Carmona  MRN: [id number]  : 1965     Identification Statement  The patient is a 59 y.o. male who underwent MicroDirect laryngoscopy, esophagoscopy, endoscopic Zenker's diverticulectomy, CO2 laser and Dobbhoff tube placement on 25 with Dr. White.     Subjective  - Pain not well-controlled overnight; he was evaluated at bedside by overnight resident and there was crepitus in his neck.  Repeat chest x-ray confirmed finding.  Pain medications were increased and the patient was started on supplemental oxygen and antibiotics.  Tube feeds are being held.  - Pain better controlled on new regimen  - Tube feeds being held     Objective  Temp:  [36.1 °C (97 °F)-37.6 °C (99.7 °F)] 36.9 °C (98.4 °F)  Heart Rate:  [69-83] 73  Resp:  [16-20] 16  BP: (115-169)/(66-93) 141/82  Gen: Alert, oriented, no acute distress  Resp: Breathing comfortably on room air, no stridor  Head: Atraumatic, normocephalic  Oral Cavity: MMM  Ears: Normal external ears  Nose: External nose midline   Neck: Crepitus upon palpation          Intake/Output Summary (Last 24 hours) at 2025 1629  Last data filed at 2025 1500      Gross per 24 hour   Intake 3678.33 ml   Output 1125 ml   Net 2553.33 ml         Labs     Assessment  The patient is a 59 y.o. male who underwent microDirect laryngoscopy, esophagoscopy, endoscopic Zenker's diverticulectomy, CO2 laser and Dobbhoff tube placement on 25 with Dr. White.     Gastrografin esophagram scheduled for 2025.     Active Problems:  - Zenker's diverticulum status post surgical repair     Plan:  - Analgesia: Scheduled acetaminophen q8h,  Oxycodone 5mg & 10 mg q6h prn, Dilaudid 0.2 q3h prn  - ID: Unasyn 3g q6h  - Resp: wean O2 as able  - CV: monitor; Losartan 100 QD  - GI: Bowel regimen, PPI and PRN Anti-emetic   - FEN: NPO until Gastrografin esophagram scheduled for 2025;  mL/hr continuous  - : Voiding spontaneously   - Heme: Trend CBC and RFP  (WBC 17.2 on 5/23)  - Endo: SSI   - Embolic PPx: SQH, SCDs while in bed  - Dispo: continued care on [facility]; Discharge planning for home 5/24 vs 5/25      Lawrence Nguyen DO PGY-3  I am the day resident. I can only be contacted from 6 AM to 5 PM. Page on weekends or off hours.   Otolaryngology - Head & Neck Surgery  ENT Consult pager: [phone]  Peds pager: [phone]  Adult Head & Neck Phone: [phone]  ENT subspecialty team: [place] individual resident who wrote today's note  Please page if urgent

## 2025-05-23 NOTE — PROGRESS NOTES
05/23/25 1508   Discharge Planning   Living Arrangements Spouse/significant other   Support Systems Spouse/significant other   Assistance Needed none   Type of Residence Private residence   Home or Post Acute Services None   Expected Discharge Disposition Home   Does the patient need discharge transport arranged? No   Financial Resource Strain   How hard is it for you to pay for the very basics like food, housing, medical care, and heating? Not very   Housing Stability   In the last 12 months, was there a time when you were not able to pay the mortgage or rent on time? N   In the past 12 months, how many times have you moved where you were living? 0   At any time in the past 12 months, were you homeless or living in a shelter (including now)? N   Transportation Needs   In the past 12 months, has lack of transportation kept you from medical appointments or from getting medications? no   In the past 12 months, has lack of transportation kept you from meetings, work, or from getting things needed for daily living? No     Care Transitions Note    Plan per Medical/Surgical Team: Pt with Zenker's diverticulum is s/p an endoscopic Zenker's diverticulotomy   Status: IP  Payor Source: [employer]  Discharge disposition: Home  Expected date of discharge: 5/25 or 5/25  Barriers: none  PCP / Primary Oncologist: PCP: Dr. Tariq Dawson, ENT: Dr. Marcelle White  Preferred Pharmacy: [facility] [address] [facility]  Preferred home care agency: N/A    This TCC met with the pt and his wife to discuss the discharge plan/verify & updated demographics.  The pt will be returning to home with his wife.  He states he is independent and will not require any DME or home care.  Will continue to follow for any home going needs that may arise.  Shavon Manning RN, TCC

## 2025-05-23 NOTE — NURSING NOTE
0100: patient having difficulty breathing and uncontrollable pain in the throat s/p zenkers procedure earlier today. MD janeen coelho called to bedside.     0110: MD assessed patient and d/c'd tube feeds and ordered IV atb's and a STAT cxr. Patient placed on continuous pulse-ox.    0130: patient desat to 79% on RA. MD made aware, patient placed on 2L NC and improved to 92%.

## 2025-05-23 NOTE — CARE PLAN
The patient's goals for the shift include      The clinical goals for the shift include Patient will remain safe    Problem: Pain - Adult  Goal: Verbalizes/displays adequate comfort level or baseline comfort level  Outcome: Progressing     Problem: Safety - Adult  Goal: Free from fall injury  Outcome: Progressing     Problem: Discharge Planning  Goal: Discharge to home or other facility with appropriate resources  Outcome: Progressing     Problem: Chronic Conditions and Co-morbidities  Goal: Patient's chronic conditions and co-morbidity symptoms are monitored and maintained or improved  Outcome: Progressing     Problem: Nutrition  Goal: Nutrient intake appropriate for maintaining nutritional needs  Outcome: Progressing     Problem: Fall/Injury  Goal: Not fall by end of shift  Outcome: Progressing  Goal: Be free from injury by end of the shift  Outcome: Progressing  Goal: Verbalize understanding of personal risk factors for fall in the hospital  Outcome: Progressing  Goal: Verbalize understanding of risk factor reduction measures to prevent injury from fall in the home  Outcome: Progressing  Goal: Pace activities to prevent fatigue by end of the shift  Outcome: Progressing     Problem: Pain  Goal: Takes deep breaths with improved pain control throughout the shift  Outcome: Progressing  Goal: Turns in bed with improved pain control throughout the shift  Outcome: Progressing  Goal: Walks with improved pain control throughout the shift  Outcome: Progressing  Goal: Performs ADL's with improved pain control throughout shift  Outcome: Progressing  Goal: Free from opioid side effects throughout the shift  Outcome: Progressing  Goal: Free from acute confusion related to pain meds throughout the shift  Outcome: Progressing

## 2025-05-23 NOTE — HOSPITAL COURSE
Rosendo Delacruz is a 59 y.o. male with Zenker's diverticulum, who presented for microDirect laryngoscopy, esophagoscopy, endoscopic Zenker's diverticulectomy, CO2 laser and Dobbhoff tube placement on 5/22/25 with Dr. White . Patient had an uncomplicated surgical course. Patient recovered in PACU and was transferred to [address] for post-operative care.    The patient was noted to have worsened pain, difficulty swallowing, and crepitus. He remained on NG feeds and strict NPO. Antibiotics were started. Esophagram with concern for possible small contained leak on POD3. Continued with NPO diet for 2 additional days prior to repeating the esophagram which showed no concern for leakage. His diet was slowly advanced from CLD to purees. His feeding tube was removed once he was tolerating the diet. On day of discharge, post-operative pain was well controlled with enteral pain medication, breathing on room air, voiding spontaneously ambulating well, and was tolerating a diet. Follow-up arranged.

## 2025-05-23 NOTE — CONSULTS
Nutrition Initial Assessment:   Nutrition Assessment    Reason for Assessment: Provider consult order, Enteral assessment/recommendation (TF)    Patient is a 59 y.o. male on day 2 of admission with a PMHX of Atrial fibrillation h/o cardioversion 2018, cardiomyopathy, arthritis and dysphagia presenting with Zenker's diverticulum   5/22- s/p Co2 laser, esophagoscopy, and dobhoff placement      Nutrition History:  Energy Intake: Fair 50-75 %  Food and Nutrient History: Met with pt at bedside and stated that prior to admission his appetite has decreased for the past 6 months. Pt stated that he could only eat a little at a time d/t difficulty swallowing. Pt stated that he would have ~2 meals/day and would have snacks sometimes and would drink water throughout the day. Pt stated that he has noticed a little bit of weight loss in the past couple months and his UBW is ~205lb. Pt denied any nausea, vomiting, or diarrhea, but has constipation at baseline and will sometimes go 3 days without a BM. Per flow sheet, Isosource 1.5 was running at 40ml/hr on 5/22 but TF was off in room. Pt stated that they turned off the TF d/t acid reflux, but pt stated he does not have acid reflux at baseline. Informed pt that when TF is started that it would be at a low rate and increase slowly to help manage reflux.  Vitamin/Herbal Supplement Use: none  Food Allergy:  (none)       Anthropometrics:  Height: 182.9 cm (6')   Weight: 88.3 kg (194 lb 10.7 oz)   BMI (Calculated): 26.4  IBW/kg (Dietitian Calculated): 81 kg  Percent of IBW: 109 %                      Weight History:   Wt Readings per review of EMR    05/22/25 88.3 kg (194 lb 10.7 oz)   05/07/25 89.4 kg (197 lb 3.2 oz)   05/05/25 87.9 kg (193 lb 12.8 oz)   04/01/25 89.2 kg (196 lb 9.6 oz)   02/05/25 88.5 kg (195 lb)   01/31/25 86.2 kg (190 lb)   01/23/25 89.3 kg (196 lb 14.4 oz)-->3% wt loss from this date to 1/31/25 (significant)    01/15/25 91.2 kg (201 lb)       Weight Change  %:  Weight History / % Weight Change: Per chart review pt had significant wt loss in January, but wt has remained stable since then  Significant Weight Loss: No    Nutrition Focused Physical Exam Findings:    Subcutaneous Fat Loss:   Orbital Fat Pads: Mild-Moderate (slight dark circles and slight hollowing)  Buccal Fat Pads: Mild-Moderate (flat cheeks, minimal bounce)  Triceps: Mild-Moderate (less than ample fat tissue)  Muscle Wasting:  Temporalis: Mild-Moderate (slight depression)  Pectoralis (Clavicular Region): Mild-Moderate (some protrusion of clavicle)  Deltoid/Trapezius: Mild-Moderate (slight protrusion of acromion process)  Interosseous: Mild-Moderate (slightly depressed area between thumb and forefinger)  Gastrocnemius: Mild-Moderate (not well developed muscle)  Edema:  Edema: none  Physical Findings:  Hair: Negative  Eyes: Negative  Nails: Negative  Skin: Negative    Nutrition Significant Labs:  CBC Trend:   Results from last 7 days   Lab Units 05/23/25  0521   WBC AUTO x10*3/uL 17.2*   RBC AUTO x10*6/uL 4.42*   HEMOGLOBIN g/dL 13.5   HEMATOCRIT % 39.5*   MCV fL 89   PLATELETS AUTO x10*3/uL 255   BMP Trend:   Results from last 7 days   Lab Units 05/23/25  0521 05/22/25  1532   GLUCOSE mg/dL 130* 118*   CALCIUM mg/dL 8.7 9.1   SODIUM mmol/L 139 138   POTASSIUM mmol/L 3.5 3.9   CO2 mmol/L 27 27   CHLORIDE mmol/L 104 105   BUN mg/dL 12 13   CREATININE mg/dL 0.91 0.78   PHOSPHORUS mg/dL 3.2 3.5     Nutrition Specific Medications:  Scheduled medications  ampicillin-sulbactam, 3 g, intravenous, q6h  docusate sodium, 100 mg, nasogastric tube, BID  esomeprazole, 40 mg, nasogastric tube, Daily before breakfast    I/O:    ;      Dietary Orders (From admission, onward)       Start     Ordered    05/23/25 0136  May Participate in Room Service With Assistance  ( ROOM SERVICE MAY PARTICIPATE WITH ASSISTANCE)  Once        Question:  .  Answer:  Yes    05/23/25 0135 05/23/25 0119  NPO Diet; Effective now  Diet  effective now         05/23/25 0118                     Estimated Needs:   Total Energy Estimated Needs in 24 hours (kCal): 2100 kCal  Method for Estimating Needs: MSJx 1.2  Total Protein Estimated Needs in 24 Hours (g): 97 g  Method for Estimating 24 Hour Protein Needs: 1.2g/kg IBW  Total Fluid Estimated Needs in 24 Hours (mL): 2100 mL  Method for Estimating 24 Hour Fluid Needs: 1ml/kcal or per MD team        Nutrition Diagnosis   Malnutrition Diagnosis  Patient has Malnutrition Diagnosis: Yes  Diagnosis Status: New  Malnutrition Diagnosis: Moderate malnutrition related to chronic disease or condition  Related to: decreased appetite  As Evidenced by: pt self report of consuming <75% of estimated energy requirements for >1 month, and mild/moderate fat and muscle losses            Nutrition Interventions/Recommendations   Nutrition prescription for enteral nutrition    Nutrition Recommendations:  Individualized Nutrition Prescription Provided for :   1. Please provide Isosource 1.5@ 20ml/hr and increase q8h until goal rate of 60ml/hrx 24 hours.   -FWF: 250mls four times daily   -This provides 1440mls, 2160kcals, 97g PRO, and 2100mls H2O.     2. Once pt tolerates continous TF for 24 hours transition to bolus feeds.   -Isosource 1.5 @ 360mls four times daily (~6 cartons/day)   -FWF: 60ml pre/post feed with additional 260mls BID.     3. If Isosource is not covered by insurance for home going  -Recommend Boost VHC @ 237ml four times daily (4 cartons/day) -FWF: 60ml pre/post feed with additional 246ml four times daily   -This provides 948mls, 2120kcals, 88g PRO, and 2100mls H2O    Nutrition Interventions/Goals:   Enteral Intake: Management of composition of enteral nutrition      Education Documentation  No documentation found.            Nutrition Monitoring and Evaluation   Food/Nutrient Related History Monitoring  Monitoring and Evaluation Plan: Enteral and parenteral nutrition intake determination  Enteral and  Parenteral Nutrition Intake Determination: Enteral nutrition intake - Tolerate TF at goal rate    Anthropometric Measurements  Monitoring and Evaluation Plan: Body weight  Body Weight: Body weight - Maintain stable weight    Biochemical Data, Medical Tests and Procedures  Monitoring and Evaluation Plan: Electrolyte/renal panel  Electrolyte and Renal Panel: Electrolytes within normal limits         Goal Status: New goal(s) identified    Time Spent (min): 60 minutes

## 2025-05-23 NOTE — PROGRESS NOTES
Pharmacy Medication History Review    Rosendo Delacruz is a 59 y.o. male admitted for Zenker's diverticulum. Pharmacy reviewed the patient's [id number] medications and allergies for accuracy.    The list below reflects the updated PTA list.   None        The list below reflects the updated allergy list. Please review each documented allergy for additional clarification and justification.  Allergies  Reviewed by Myriam Plummer RN on 5/22/2025   No Known Allergies         Patient accepts M2B at discharge. Pharmacy has been updated to [facility].    Sources used to complete the med history include:    [facility]  Pharmacy dispense history  Patient Interview Good historian  Chart Review  Care Everywhere   Pre-admission testing note 5/5/25     Below are additional concerns with the patient's PTA list.  Patient is currently taking no prescription or over the counter medications at home.      Medications ADDED:  None   Medications CHANGED:  None   Medications REMOVED:   None     Eben Campbell [facility].   Transitions of Care Pharmacist    Please reach out via Secure Chat for questions, or if no response call [employer] or [facility]

## 2025-05-24 LAB
ALBUMIN SERPL BCP-MCNC: 3.7 G/DL (ref 3.4–5)
ANION GAP SERPL CALC-SCNC: 11 MMOL/L (ref 10–20)
BUN SERPL-MCNC: 9 MG/DL (ref 6–23)
CALCIUM SERPL-MCNC: 8.7 MG/DL (ref 8.6–10.6)
CHLORIDE SERPL-SCNC: 104 MMOL/L (ref 98–107)
CO2 SERPL-SCNC: 30 MMOL/L (ref 21–32)
CREAT SERPL-MCNC: 0.85 MG/DL (ref 0.5–1.3)
EGFRCR SERPLBLD CKD-EPI 2021: >90 ML/MIN/1.73M*2
ERYTHROCYTE [DISTWIDTH] IN BLOOD BY AUTOMATED COUNT: 13 % (ref 11.5–14.5)
GLUCOSE SERPL-MCNC: 94 MG/DL (ref 74–99)
HCT VFR BLD AUTO: 37.7 % (ref 41–52)
HGB BLD-MCNC: 12.4 G/DL (ref 13.5–17.5)
MCH RBC QN AUTO: 31.2 PG (ref 26–34)
MCHC RBC AUTO-ENTMCNC: 32.9 G/DL (ref 32–36)
MCV RBC AUTO: 95 FL (ref 80–100)
NRBC BLD-RTO: 0 /100 WBCS (ref 0–0)
PHOSPHATE SERPL-MCNC: 2.2 MG/DL (ref 2.5–4.9)
PLATELET # BLD AUTO: 204 X10*3/UL (ref 150–450)
POTASSIUM SERPL-SCNC: 3.5 MMOL/L (ref 3.5–5.3)
RBC # BLD AUTO: 3.98 X10*6/UL (ref 4.5–5.9)
SODIUM SERPL-SCNC: 141 MMOL/L (ref 136–145)
WBC # BLD AUTO: 11.1 X10*3/UL (ref 4.4–11.3)

## 2025-05-24 PROCEDURE — 2500000004 HC RX 250 GENERAL PHARMACY W/ HCPCS (ALT 636 FOR OP/ED): Mod: JZ | Performed by: STUDENT IN AN ORGANIZED HEALTH CARE EDUCATION/TRAINING PROGRAM

## 2025-05-24 PROCEDURE — 85027 COMPLETE CBC AUTOMATED: CPT | Performed by: STUDENT IN AN ORGANIZED HEALTH CARE EDUCATION/TRAINING PROGRAM

## 2025-05-24 PROCEDURE — 36415 COLL VENOUS BLD VENIPUNCTURE: CPT | Performed by: STUDENT IN AN ORGANIZED HEALTH CARE EDUCATION/TRAINING PROGRAM

## 2025-05-24 PROCEDURE — 2500000004 HC RX 250 GENERAL PHARMACY W/ HCPCS (ALT 636 FOR OP/ED): Mod: JZ

## 2025-05-24 PROCEDURE — 80069 RENAL FUNCTION PANEL: CPT | Performed by: STUDENT IN AN ORGANIZED HEALTH CARE EDUCATION/TRAINING PROGRAM

## 2025-05-24 PROCEDURE — 1170000001 HC PRIVATE ONCOLOGY ROOM DAILY

## 2025-05-24 PROCEDURE — 2500000001 HC RX 250 WO HCPCS SELF ADMINISTERED DRUGS (ALT 637 FOR MEDICARE OP)

## 2025-05-24 PROCEDURE — 2500000001 HC RX 250 WO HCPCS SELF ADMINISTERED DRUGS (ALT 637 FOR MEDICARE OP): Performed by: STUDENT IN AN ORGANIZED HEALTH CARE EDUCATION/TRAINING PROGRAM

## 2025-05-24 PROCEDURE — 99024 POSTOP FOLLOW-UP VISIT: CPT | Performed by: OTOLARYNGOLOGY

## 2025-05-24 RX ORDER — HYDRALAZINE HYDROCHLORIDE 20 MG/ML
10 INJECTION INTRAMUSCULAR; INTRAVENOUS ONCE
Status: COMPLETED | OUTPATIENT
Start: 2025-05-24 | End: 2025-05-24

## 2025-05-24 RX ORDER — SODIUM CHLORIDE, SODIUM LACTATE, POTASSIUM CHLORIDE, CALCIUM CHLORIDE 600; 310; 30; 20 MG/100ML; MG/100ML; MG/100ML; MG/100ML
100 INJECTION, SOLUTION INTRAVENOUS CONTINUOUS
Status: ACTIVE | OUTPATIENT
Start: 2025-05-24 | End: 2025-05-25

## 2025-05-24 RX ADMIN — ACETAMINOPHEN 1000 MG: 160 SOLUTION ORAL at 18:24

## 2025-05-24 RX ADMIN — OXYCODONE HYDROCHLORIDE 10 MG: 5 SOLUTION ORAL at 12:45

## 2025-05-24 RX ADMIN — SODIUM CHLORIDE, SODIUM LACTATE, POTASSIUM CHLORIDE, AND CALCIUM CHLORIDE 100 ML/HR: .6; .31; .03; .02 INJECTION, SOLUTION INTRAVENOUS at 11:15

## 2025-05-24 RX ADMIN — AMPICILLIN SODIUM AND SULBACTAM SODIUM 3 G: 2; 1 INJECTION, POWDER, FOR SOLUTION INTRAMUSCULAR; INTRAVENOUS at 02:31

## 2025-05-24 RX ADMIN — HYDROMORPHONE HYDROCHLORIDE 0.2 MG: 1 INJECTION, SOLUTION INTRAMUSCULAR; INTRAVENOUS; SUBCUTANEOUS at 03:32

## 2025-05-24 RX ADMIN — AMPICILLIN SODIUM AND SULBACTAM SODIUM 3 G: 2; 1 INJECTION, POWDER, FOR SOLUTION INTRAMUSCULAR; INTRAVENOUS at 21:24

## 2025-05-24 RX ADMIN — HYDROMORPHONE HYDROCHLORIDE 0.2 MG: 1 INJECTION, SOLUTION INTRAMUSCULAR; INTRAVENOUS; SUBCUTANEOUS at 16:01

## 2025-05-24 RX ADMIN — HYDROMORPHONE HYDROCHLORIDE 0.2 MG: 1 INJECTION, SOLUTION INTRAMUSCULAR; INTRAVENOUS; SUBCUTANEOUS at 08:08

## 2025-05-24 RX ADMIN — ESOMEPRAZOLE MAGNESIUM 40 MG: 40 FOR SUSPENSION ORAL at 09:15

## 2025-05-24 RX ADMIN — AMPICILLIN SODIUM AND SULBACTAM SODIUM 3 G: 2; 1 INJECTION, POWDER, FOR SOLUTION INTRAMUSCULAR; INTRAVENOUS at 08:09

## 2025-05-24 RX ADMIN — AMPICILLIN SODIUM AND SULBACTAM SODIUM 3 G: 2; 1 INJECTION, POWDER, FOR SOLUTION INTRAMUSCULAR; INTRAVENOUS at 13:53

## 2025-05-24 RX ADMIN — HYDRALAZINE HYDROCHLORIDE 10 MG: 20 INJECTION INTRAMUSCULAR; INTRAVENOUS at 11:08

## 2025-05-24 RX ADMIN — SODIUM CHLORIDE, POTASSIUM CHLORIDE, SODIUM LACTATE AND CALCIUM CHLORIDE 100 ML/HR: 600; 310; 30; 20 INJECTION, SOLUTION INTRAVENOUS at 11:41

## 2025-05-24 RX ADMIN — ACETAMINOPHEN 1000 MG: 160 SOLUTION ORAL at 09:15

## 2025-05-24 RX ADMIN — OXYCODONE HYDROCHLORIDE 10 MG: 5 SOLUTION ORAL at 19:30

## 2025-05-24 RX ADMIN — ENOXAPARIN SODIUM 40 MG: 100 INJECTION SUBCUTANEOUS at 21:24

## 2025-05-24 ASSESSMENT — PAIN - FUNCTIONAL ASSESSMENT
PAIN_FUNCTIONAL_ASSESSMENT: 0-10

## 2025-05-24 ASSESSMENT — PAIN SCALES - GENERAL
PAINLEVEL_OUTOF10: 4
PAINLEVEL_OUTOF10: 7
PAINLEVEL_OUTOF10: 8
PAINLEVEL_OUTOF10: 4
PAINLEVEL_OUTOF10: 7
PAINLEVEL_OUTOF10: 4

## 2025-05-24 ASSESSMENT — PAIN DESCRIPTION - LOCATION: LOCATION: THROAT

## 2025-05-24 ASSESSMENT — PAIN DESCRIPTION - DESCRIPTORS: DESCRIPTORS: ACHING

## 2025-05-24 NOTE — DISCHARGE INSTRUCTIONS
Postoperative Instructions  Throat Surgery  Throat surgery is usually performed on an outpatient basis, but some patients require an overnight hospital stay.    What to expect:  Significant throat pain and ear discomfort, particularly with swallowing.  This can often get worse on day 4-5 before getting better.  Phlegm/mucous/drainage in the back of the throat  Bad breath  Temporary change in voice and taste, temporary tongue numbness   Return to office for post-op visit, typically 2-3 weeks after surgery    What NOT to expect:  Coughing/spitting up blood.  Occasional blood-tinged mucous or saliva can occur, but brisk, bright red bleeding is NOT expected.  This risk of bleeding is present for a full 2 weeks after surgery.  If this happens, call 911 or go to your nearest emergency room.   Fevers, chills, excessive swelling in the neck, severe neck pain or pain that radiations to your chest or back. Call the office if you experience this or go to your nearest emergency room.  Post Op Care:  Diet: pureed diet  Avoid foods that are hard/scratchy (like chips, crust, and meat) and foods that are too acidic/spicy/salty (like orange juice, tomato sauce, soda, and alcohol)  Drink plenty of fluids!  Keeping the throat moist helps with discomfort and improves healing.  No strenuous activity, heavy lifting, bending over, or exercise for 2 weeks after surgery  Return to work can vary but is typically 1-2 weeks.  Sleep with the head elevated.  Take medications as directed.    Medications:  Narcotic pain medication is usually given as needed for more severe pain.  Potential side effects include nausea, upset stomach, constipation, headache, and itchiness.  If you are having side effects from the medicines or if the pain is more mild, use plain acetaminophen (Tylenol).  Anti-inflammatory medication (Prednisone or Medrol) may be given - take as directed with food starting  on the day after surgery.   Don't take aspirin, fish oil, or herbal supplements unless directed to do so.

## 2025-05-24 NOTE — CARE PLAN
Problem: Pain - Adult  Goal: Verbalizes/displays adequate comfort level or baseline comfort level  Outcome: Progressing     Problem: Safety - Adult  Goal: Free from fall injury  Outcome: Progressing     Problem: Discharge Planning  Goal: Discharge to home or other facility with appropriate resources  Outcome: Progressing     Problem: Chronic Conditions and Co-morbidities  Goal: Patient's chronic conditions and co-morbidity symptoms are monitored and maintained or improved  Outcome: Progressing     Problem: Nutrition  Goal: Nutrient intake appropriate for maintaining nutritional needs  Outcome: Progressing     Problem: Fall/Injury  Goal: Not fall by end of shift  Outcome: Progressing  Goal: Be free from injury by end of the shift  Outcome: Progressing  Goal: Verbalize understanding of personal risk factors for fall in the hospital  Outcome: Progressing  Goal: Verbalize understanding of risk factor reduction measures to prevent injury from fall in the home  Outcome: Progressing  Goal: Pace activities to prevent fatigue by end of the shift  Outcome: Progressing   The patient's goals for the shift include      The clinical goals for the shift include Patient will remain safe

## 2025-05-24 NOTE — PROGRESS NOTES
ENT DAILY PROGRESS NOTE  Name: Rosendo Carmona  MRN: [id number]  : 1965     Identification Statement  The patient is a 59 y.o. male who underwent MicroDirect laryngoscopy, esophagoscopy, endoscopic Zenker's diverticulectomy, CO2 laser and Dobbhoff tube placement on 25 with Dr. White.     Subjective  - Patient continuing to have some neck pain which does improve with medication. Some subjective swallow discomfort and voice change. No stridor or oxygen requirement. Tube feeds are being held.  - No other new concern or complaint       Objective  Temp:  [36.1 °C (97 °F)-37.6 °C (99.7 °F)] 36.9 °C (98.4 °F)  Heart Rate:  [69-83] 73  Resp:  [16-20] 16  BP: (115-169)/(66-93) 141/82  Gen: Alert, oriented, no acute distress  Resp: Breathing comfortably on room air, no stridor  Head: Atraumatic, normocephalic  Oral Cavity: MMM  Ears: Normal external ears  Nose: External nose midline   Neck: Crepitus upon palpation, mostly over right clavicle         Intake/Output Summary (Last 24 hours) at 2025 1629  Last data filed at 2025 1500      Gross per 24 hour   Intake 3678.33 ml   Output 1125 ml   Net 2553.33 ml         Labs     Assessment  The patient is a 59 y.o. male who underwent microDirect laryngoscopy, esophagoscopy, endoscopic Zenker's diverticulectomy, CO2 laser and Dobbhoff tube placement on 25 with Dr. White.     : Will hold off for today on esophagram. Will closely monitor neck swelling and crepitus. Keep NPO except for meds     Active Problems:  - Zenker's diverticulum status post surgical repair     Plan:  - Analgesia: Scheduled acetaminophen q8h,  Oxycodone 5mg & 10 mg q6h prn, Dilaudid 0.2 q3h prn  - ID: Unasyn 3g q6h  - Resp: wean O2 as able  - CV: monitor; Losartan 100 QD  - GI: Bowel regimen, PPI and PRN Anti-emetic   - FEN: NPO until Gastrografin esophagram;  mL/hr continuous  - : Voiding spontaneously   - Heme: Trend CBC and RFP (WBC 17.2 on ) (decreased to 11 on )  -  Endo: SSI   - Embolic PPx: SQH, SCDs while in bed  - Dispo: continued care on SCC5;      Spencer Jeffries MD PGY4  Otolaryngology - Head & Neck Surgery  ENT Consult pager: [phone]  Peds pager: [phone]  Adult Head & Neck Phone: [phone]  ENT subspecialty team: [place] individual resident who wrote today's note  Please page if urgent     I saw and evaluated the patient. I personally obtained the key and critical portions of the history and physical exam or was physically present for key and critical portions performed by the resident/fellow. I reviewed the resident/fellow's documentation and discussed the patient with the resident/fellow. I agree with the resident/fellow's medical decision making as documented in the note.    Claudia Hutton MD

## 2025-05-24 NOTE — CARE PLAN
The patient's goals for the shift include  pain control, rest, OOB walking in [place]    The clinical goals for the shift include pain control    Over the shift, the patient did not make progress toward the following goals. Barriers to progression include recent surgical procedure, new environment. Recommendations to address these barriers include walk with pt in halls, frequent pain assessment.

## 2025-05-25 ENCOUNTER — APPOINTMENT (OUTPATIENT)
Dept: RADIOLOGY | Facility: HOSPITAL | Age: 60
DRG: 982 | End: 2025-05-25
Payer: COMMERCIAL

## 2025-05-25 LAB
ALBUMIN SERPL BCP-MCNC: 3.6 G/DL (ref 3.4–5)
ANION GAP SERPL CALC-SCNC: 10 MMOL/L (ref 10–20)
BUN SERPL-MCNC: 9 MG/DL (ref 6–23)
CALCIUM SERPL-MCNC: 8.8 MG/DL (ref 8.6–10.6)
CHLORIDE SERPL-SCNC: 102 MMOL/L (ref 98–107)
CO2 SERPL-SCNC: 31 MMOL/L (ref 21–32)
CREAT SERPL-MCNC: 0.68 MG/DL (ref 0.5–1.3)
EGFRCR SERPLBLD CKD-EPI 2021: >90 ML/MIN/1.73M*2
ERYTHROCYTE [DISTWIDTH] IN BLOOD BY AUTOMATED COUNT: 12.7 % (ref 11.5–14.5)
GLUCOSE SERPL-MCNC: 85 MG/DL (ref 74–99)
HCT VFR BLD AUTO: 38 % (ref 41–52)
HGB BLD-MCNC: 12.4 G/DL (ref 13.5–17.5)
MAGNESIUM SERPL-MCNC: 1.97 MG/DL (ref 1.6–2.4)
MCH RBC QN AUTO: 31 PG (ref 26–34)
MCHC RBC AUTO-ENTMCNC: 32.6 G/DL (ref 32–36)
MCV RBC AUTO: 95 FL (ref 80–100)
NRBC BLD-RTO: 0 /100 WBCS (ref 0–0)
PHOSPHATE SERPL-MCNC: 3.1 MG/DL (ref 2.5–4.9)
PLATELET # BLD AUTO: 221 X10*3/UL (ref 150–450)
POTASSIUM SERPL-SCNC: 3.7 MMOL/L (ref 3.5–5.3)
RBC # BLD AUTO: 4 X10*6/UL (ref 4.5–5.9)
SODIUM SERPL-SCNC: 139 MMOL/L (ref 136–145)
WBC # BLD AUTO: 8.5 X10*3/UL (ref 4.4–11.3)

## 2025-05-25 PROCEDURE — 1170000001 HC PRIVATE ONCOLOGY ROOM DAILY

## 2025-05-25 PROCEDURE — 85027 COMPLETE CBC AUTOMATED: CPT | Performed by: STUDENT IN AN ORGANIZED HEALTH CARE EDUCATION/TRAINING PROGRAM

## 2025-05-25 PROCEDURE — 2500000004 HC RX 250 GENERAL PHARMACY W/ HCPCS (ALT 636 FOR OP/ED): Mod: JZ | Performed by: STUDENT IN AN ORGANIZED HEALTH CARE EDUCATION/TRAINING PROGRAM

## 2025-05-25 PROCEDURE — 80069 RENAL FUNCTION PANEL: CPT | Performed by: STUDENT IN AN ORGANIZED HEALTH CARE EDUCATION/TRAINING PROGRAM

## 2025-05-25 PROCEDURE — 99024 POSTOP FOLLOW-UP VISIT: CPT | Performed by: OTOLARYNGOLOGY

## 2025-05-25 PROCEDURE — 2550000001 HC RX 255 CONTRASTS: Performed by: OTOLARYNGOLOGY

## 2025-05-25 PROCEDURE — 2500000001 HC RX 250 WO HCPCS SELF ADMINISTERED DRUGS (ALT 637 FOR MEDICARE OP)

## 2025-05-25 PROCEDURE — 2500000001 HC RX 250 WO HCPCS SELF ADMINISTERED DRUGS (ALT 637 FOR MEDICARE OP): Performed by: STUDENT IN AN ORGANIZED HEALTH CARE EDUCATION/TRAINING PROGRAM

## 2025-05-25 PROCEDURE — 2500000004 HC RX 250 GENERAL PHARMACY W/ HCPCS (ALT 636 FOR OP/ED): Mod: JZ

## 2025-05-25 PROCEDURE — 36415 COLL VENOUS BLD VENIPUNCTURE: CPT | Performed by: STUDENT IN AN ORGANIZED HEALTH CARE EDUCATION/TRAINING PROGRAM

## 2025-05-25 PROCEDURE — 74220 X-RAY XM ESOPHAGUS 1CNTRST: CPT | Performed by: RADIOLOGY

## 2025-05-25 PROCEDURE — 74220 X-RAY XM ESOPHAGUS 1CNTRST: CPT

## 2025-05-25 PROCEDURE — 83735 ASSAY OF MAGNESIUM: CPT | Performed by: STUDENT IN AN ORGANIZED HEALTH CARE EDUCATION/TRAINING PROGRAM

## 2025-05-25 RX ORDER — SODIUM CHLORIDE, SODIUM LACTATE, POTASSIUM CHLORIDE, CALCIUM CHLORIDE 600; 310; 30; 20 MG/100ML; MG/100ML; MG/100ML; MG/100ML
100 INJECTION, SOLUTION INTRAVENOUS CONTINUOUS
Status: ACTIVE | OUTPATIENT
Start: 2025-05-25 | End: 2025-05-26

## 2025-05-25 RX ORDER — DIATRIZOATE MEGLUMINE AND DIATRIZOATE SODIUM 660; 100 MG/ML; MG/ML
60 SOLUTION ORAL; RECTAL ONCE
Status: COMPLETED | OUTPATIENT
Start: 2025-05-25 | End: 2025-05-25

## 2025-05-25 RX ADMIN — AMPICILLIN SODIUM AND SULBACTAM SODIUM 3 G: 2; 1 INJECTION, POWDER, FOR SOLUTION INTRAMUSCULAR; INTRAVENOUS at 03:19

## 2025-05-25 RX ADMIN — IOHEXOL 45 ML: 240 INJECTION, SOLUTION INTRATHECAL; INTRAVASCULAR; INTRAVENOUS; ORAL at 11:28

## 2025-05-25 RX ADMIN — OXYCODONE HYDROCHLORIDE 10 MG: 5 SOLUTION ORAL at 06:43

## 2025-05-25 RX ADMIN — AMPICILLIN SODIUM AND SULBACTAM SODIUM 3 G: 2; 1 INJECTION, POWDER, FOR SOLUTION INTRAMUSCULAR; INTRAVENOUS at 15:42

## 2025-05-25 RX ADMIN — AMPICILLIN SODIUM AND SULBACTAM SODIUM 3 G: 2; 1 INJECTION, POWDER, FOR SOLUTION INTRAMUSCULAR; INTRAVENOUS at 09:50

## 2025-05-25 RX ADMIN — OXYCODONE HYDROCHLORIDE 10 MG: 5 SOLUTION ORAL at 00:59

## 2025-05-25 RX ADMIN — AMPICILLIN SODIUM AND SULBACTAM SODIUM 3 G: 2; 1 INJECTION, POWDER, FOR SOLUTION INTRAMUSCULAR; INTRAVENOUS at 19:51

## 2025-05-25 RX ADMIN — OXYCODONE HYDROCHLORIDE 10 MG: 5 SOLUTION ORAL at 13:51

## 2025-05-25 RX ADMIN — SODIUM CHLORIDE, POTASSIUM CHLORIDE, SODIUM LACTATE AND CALCIUM CHLORIDE 100 ML/HR: 600; 310; 30; 20 INJECTION, SOLUTION INTRAVENOUS at 12:31

## 2025-05-25 RX ADMIN — ACETAMINOPHEN 1000 MG: 160 SOLUTION ORAL at 09:50

## 2025-05-25 RX ADMIN — ENOXAPARIN SODIUM 40 MG: 100 INJECTION SUBCUTANEOUS at 19:51

## 2025-05-25 RX ADMIN — DOCUSATE SODIUM 100 MG: 50 LIQUID ORAL at 19:51

## 2025-05-25 RX ADMIN — DIATRIZOATE MEGLUMINE AND DIATRIZOATE SODIUM 20 ML: 660; 100 LIQUID ORAL; RECTAL at 11:30

## 2025-05-25 RX ADMIN — ESOMEPRAZOLE MAGNESIUM 40 MG: 40 FOR SUSPENSION ORAL at 06:43

## 2025-05-25 RX ADMIN — ACETAMINOPHEN 1000 MG: 160 SOLUTION ORAL at 17:21

## 2025-05-25 RX ADMIN — ACETAMINOPHEN 1000 MG: 160 SOLUTION ORAL at 00:59

## 2025-05-25 RX ADMIN — OXYCODONE HYDROCHLORIDE 10 MG: 5 SOLUTION ORAL at 19:51

## 2025-05-25 ASSESSMENT — PAIN SCALES - GENERAL
PAINLEVEL_OUTOF10: 8
PAINLEVEL_OUTOF10: 2
PAINLEVEL_OUTOF10: 7
PAINLEVEL_OUTOF10: 7
PAINLEVEL_OUTOF10: 4

## 2025-05-25 ASSESSMENT — COGNITIVE AND FUNCTIONAL STATUS - GENERAL
EATING MEALS: A LITTLE
DAILY ACTIVITIY SCORE: 23
MOBILITY SCORE: 24

## 2025-05-25 ASSESSMENT — PAIN - FUNCTIONAL ASSESSMENT
PAIN_FUNCTIONAL_ASSESSMENT: 0-10

## 2025-05-25 NOTE — CARE PLAN
Problem: Pain - Adult  Goal: Verbalizes/displays adequate comfort level or baseline comfort level  Outcome: Progressing     Problem: Safety - Adult  Goal: Free from fall injury  Outcome: Progressing     Problem: Discharge Planning  Goal: Discharge to home or other facility with appropriate resources  Outcome: Progressing     Problem: Chronic Conditions and Co-morbidities  Goal: Patient's chronic conditions and co-morbidity symptoms are monitored and maintained or improved  Outcome: Progressing     Problem: Nutrition  Goal: Nutrient intake appropriate for maintaining nutritional needs  Outcome: Progressing     Problem: Fall/Injury  Goal: Not fall by end of shift  Outcome: Progressing  Goal: Be free from injury by end of the shift  Outcome: Progressing  Goal: Verbalize understanding of personal risk factors for fall in the hospital  Outcome: Progressing  Goal: Verbalize understanding of risk factor reduction measures to prevent injury from fall in the home  Outcome: Progressing  Goal: Pace activities to prevent fatigue by end of the shift  Outcome: Progressing     Problem: Pain  Goal: Takes deep breaths with improved pain control throughout the shift  Outcome: Progressing  Goal: Turns in bed with improved pain control throughout the shift  Outcome: Progressing  Goal: Walks with improved pain control throughout the shift  Outcome: Progressing  Goal: Performs ADL's with improved pain control throughout shift  Outcome: Progressing  Goal: Free from opioid side effects throughout the shift  Outcome: Progressing  Goal: Free from acute confusion related to pain meds throughout the shift  Outcome: Progressing       The clinical goals for the shift include Patient will remain HDS and VSS throughout shift

## 2025-05-25 NOTE — CARE PLAN
Problem: Pain - Adult  Goal: Verbalizes/displays adequate comfort level or baseline comfort level  Outcome: Progressing     Problem: Safety - Adult  Goal: Free from fall injury  Outcome: Progressing     Problem: Discharge Planning  Goal: Discharge to home or other facility with appropriate resources  Outcome: Progressing     Problem: Chronic Conditions and Co-morbidities  Goal: Patient's chronic conditions and co-morbidity symptoms are monitored and maintained or improved  Outcome: Progressing     Problem: Nutrition  Goal: Nutrient intake appropriate for maintaining nutritional needs  Outcome: Progressing     Problem: Fall/Injury  Goal: Not fall by end of shift  Outcome: Progressing  Goal: Be free from injury by end of the shift  Outcome: Progressing  Goal: Verbalize understanding of personal risk factors for fall in the hospital  Outcome: Progressing  Goal: Verbalize understanding of risk factor reduction measures to prevent injury from fall in the home  Outcome: Progressing  Goal: Pace activities to prevent fatigue by end of the shift  Outcome: Progressing     Problem: Pain  Goal: Takes deep breaths with improved pain control throughout the shift  Outcome: Progressing  Goal: Turns in bed with improved pain control throughout the shift  Outcome: Progressing  Goal: Walks with improved pain control throughout the shift  Outcome: Progressing  Goal: Performs ADL's with improved pain control throughout shift  Outcome: Progressing  Goal: Free from opioid side effects throughout the shift  Outcome: Progressing  Goal: Free from acute confusion related to pain meds throughout the shift  Outcome: Progressing   The patient's goals for the shift include  rest and comfort    The clinical goals for the shift include Patient's pain will be controlled and rate pain 5 out of 10 or less throughout shift    Over the shift, the patient did not make progress toward the following goals. Barriers to progression include pain.  Recommendations to address these barriers include prn meds.

## 2025-05-25 NOTE — PROGRESS NOTES
ENT DAILY PROGRESS NOTE  Name: Rosendo Carmona  MRN: [id number]  : 1965     Identification Statement  The patient is a 59 y.o. male who underwent MicroDirect laryngoscopy, esophagoscopy, endoscopic Zenker's diverticulectomy, CO2 laser and Dobbhoff tube placement on 25 with Dr. White.     Subjective  - Secretions and pain improved from yesterday. Some subjective improvement in swelling. Voice more normal as well. He remains NPO. No fever or chills.       Objective  Temp:  [36.1 °C (97 °F)-37.6 °C (99.7 °F)] 36.9 °C (98.4 °F)  Heart Rate:  [69-83] 73  Resp:  [16-20] 16  BP: (115-169)/(66-93) 141/82  Gen: Alert, oriented, no acute distress  Resp: Breathing comfortably on room air, no stridor  Head: Atraumatic, normocephalic  Oral Cavity: MMM  Ears: Normal external ears  Nose: External nose midline   Neck: Crepitus upon palpation, mostly over right clavicle. Slightly improved from          Intake/Output Summary (Last 24 hours) at 2025 1629  Last data filed at 2025 1500      Gross per 24 hour   Intake 3678.33 ml   Output 1125 ml   Net 2553.33 ml         Labs     Assessment  The patient is a 59 y.o. male who underwent microDirect laryngoscopy, esophagoscopy, endoscopic Zenker's diverticulectomy, CO2 laser and Dobbhoff tube placement on 25 with Dr. White.     : Will hold off for today on esophagram. Will closely monitor neck swelling and crepitus. Keep NPO except for meds  : Clinical improvement and exam also improved. Will plan to check CBC this AM and consider esophagram today     Active Problems:  - Zenker's diverticulum status post surgical repair     Plan:  - Analgesia: Scheduled acetaminophen q8h,  Oxycodone 5mg & 10 mg q6h prn, Dilaudid 0.2 q3h prn  - ID: Unasyn 3g q6h  - Resp: wean O2 as able  - CV: monitor; Losartan 100 QD  - GI: Bowel regimen, PPI and PRN Anti-emetic   - FEN: NPO until Gastrografin esophagram;  mL/hr continuous  - : Voiding spontaneously   - Heme: Trend  CBC and RFP (WBC 17.2 on 5/23) (decreased to 11 on 5/24)  - Endo: SSI   - Embolic PPx: SQH, SCDs while in bed  - Dispo: continued care on SCC5;      Spencer Jeffries MD PGY4  Otolaryngology - Head & Neck Surgery  ENT Consult pager: [phone]  Peds pager: [phone]  Adult Head & Neck Phone: [phone]  ENT subspecialty team: [place] individual resident who wrote today's note  Please page if urgent

## 2025-05-26 LAB
ALBUMIN SERPL BCP-MCNC: 3.6 G/DL (ref 3.4–5)
ANION GAP SERPL CALC-SCNC: 10 MMOL/L (ref 10–20)
BUN SERPL-MCNC: 9 MG/DL (ref 6–23)
CALCIUM SERPL-MCNC: 9 MG/DL (ref 8.6–10.6)
CHLORIDE SERPL-SCNC: 103 MMOL/L (ref 98–107)
CO2 SERPL-SCNC: 30 MMOL/L (ref 21–32)
CREAT SERPL-MCNC: 0.73 MG/DL (ref 0.5–1.3)
EGFRCR SERPLBLD CKD-EPI 2021: >90 ML/MIN/1.73M*2
ERYTHROCYTE [DISTWIDTH] IN BLOOD BY AUTOMATED COUNT: 12.5 % (ref 11.5–14.5)
GLUCOSE SERPL-MCNC: 106 MG/DL (ref 74–99)
HCT VFR BLD AUTO: 35.2 % (ref 41–52)
HGB BLD-MCNC: 11.9 G/DL (ref 13.5–17.5)
MAGNESIUM SERPL-MCNC: 1.99 MG/DL (ref 1.6–2.4)
MCH RBC QN AUTO: 31 PG (ref 26–34)
MCHC RBC AUTO-ENTMCNC: 33.8 G/DL (ref 32–36)
MCV RBC AUTO: 92 FL (ref 80–100)
NRBC BLD-RTO: 0 /100 WBCS (ref 0–0)
PHOSPHATE SERPL-MCNC: 3.8 MG/DL (ref 2.5–4.9)
PLATELET # BLD AUTO: 235 X10*3/UL (ref 150–450)
POTASSIUM SERPL-SCNC: 3.5 MMOL/L (ref 3.5–5.3)
RBC # BLD AUTO: 3.84 X10*6/UL (ref 4.5–5.9)
SODIUM SERPL-SCNC: 139 MMOL/L (ref 136–145)
WBC # BLD AUTO: 7.2 X10*3/UL (ref 4.4–11.3)

## 2025-05-26 PROCEDURE — 36415 COLL VENOUS BLD VENIPUNCTURE: CPT | Performed by: STUDENT IN AN ORGANIZED HEALTH CARE EDUCATION/TRAINING PROGRAM

## 2025-05-26 PROCEDURE — 2500000002 HC RX 250 W HCPCS SELF ADMINISTERED DRUGS (ALT 637 FOR MEDICARE OP, ALT 636 FOR OP/ED)

## 2025-05-26 PROCEDURE — 1170000001 HC PRIVATE ONCOLOGY ROOM DAILY

## 2025-05-26 PROCEDURE — 2500000001 HC RX 250 WO HCPCS SELF ADMINISTERED DRUGS (ALT 637 FOR MEDICARE OP)

## 2025-05-26 PROCEDURE — 99024 POSTOP FOLLOW-UP VISIT: CPT | Performed by: OTOLARYNGOLOGY

## 2025-05-26 PROCEDURE — 99222 1ST HOSP IP/OBS MODERATE 55: CPT

## 2025-05-26 PROCEDURE — 87801 DETECT AGNT MULT DNA AMPLI: CPT

## 2025-05-26 PROCEDURE — 2500000004 HC RX 250 GENERAL PHARMACY W/ HCPCS (ALT 636 FOR OP/ED): Mod: JZ

## 2025-05-26 PROCEDURE — 85027 COMPLETE CBC AUTOMATED: CPT | Performed by: STUDENT IN AN ORGANIZED HEALTH CARE EDUCATION/TRAINING PROGRAM

## 2025-05-26 PROCEDURE — 84100 ASSAY OF PHOSPHORUS: CPT | Performed by: STUDENT IN AN ORGANIZED HEALTH CARE EDUCATION/TRAINING PROGRAM

## 2025-05-26 PROCEDURE — 2500000001 HC RX 250 WO HCPCS SELF ADMINISTERED DRUGS (ALT 637 FOR MEDICARE OP): Performed by: STUDENT IN AN ORGANIZED HEALTH CARE EDUCATION/TRAINING PROGRAM

## 2025-05-26 PROCEDURE — 99221 1ST HOSP IP/OBS SF/LOW 40: CPT | Performed by: DERMATOLOGY

## 2025-05-26 PROCEDURE — 83735 ASSAY OF MAGNESIUM: CPT | Performed by: STUDENT IN AN ORGANIZED HEALTH CARE EDUCATION/TRAINING PROGRAM

## 2025-05-26 PROCEDURE — 2500000004 HC RX 250 GENERAL PHARMACY W/ HCPCS (ALT 636 FOR OP/ED): Mod: JZ | Performed by: STUDENT IN AN ORGANIZED HEALTH CARE EDUCATION/TRAINING PROGRAM

## 2025-05-26 PROCEDURE — 2500000005 HC RX 250 GENERAL PHARMACY W/O HCPCS

## 2025-05-26 RX ORDER — ACETAMINOPHEN 160 MG/5ML
650 SOLUTION ORAL EVERY 6 HOURS SCHEDULED
Status: DISCONTINUED | OUTPATIENT
Start: 2025-05-27 | End: 2025-05-28 | Stop reason: HOSPADM

## 2025-05-26 RX ORDER — VALACYCLOVIR HYDROCHLORIDE 500 MG/1
1000 TABLET, FILM COATED ORAL EVERY 8 HOURS SCHEDULED
Status: DISCONTINUED | OUTPATIENT
Start: 2025-05-26 | End: 2025-05-28 | Stop reason: HOSPADM

## 2025-05-26 RX ORDER — LISINOPRIL 20 MG/1
10 TABLET ORAL DAILY
Status: DISCONTINUED | OUTPATIENT
Start: 2025-05-26 | End: 2025-05-27

## 2025-05-26 RX ORDER — HYDRALAZINE HYDROCHLORIDE 10 MG/1
10 TABLET, FILM COATED ORAL 3 TIMES DAILY PRN
Status: DISCONTINUED | OUTPATIENT
Start: 2025-05-26 | End: 2025-05-28 | Stop reason: HOSPADM

## 2025-05-26 RX ORDER — TRIPROLIDINE/PSEUDOEPHEDRINE 2.5MG-60MG
600 TABLET ORAL EVERY 6 HOURS SCHEDULED
Status: DISCONTINUED | OUTPATIENT
Start: 2025-05-26 | End: 2025-05-28 | Stop reason: HOSPADM

## 2025-05-26 RX ORDER — MUPIROCIN 20 MG/G
OINTMENT TOPICAL 3 TIMES DAILY
Status: DISCONTINUED | OUTPATIENT
Start: 2025-05-26 | End: 2025-05-28 | Stop reason: HOSPADM

## 2025-05-26 RX ADMIN — OXYCODONE HYDROCHLORIDE 5 MG: 5 SOLUTION ORAL at 22:53

## 2025-05-26 RX ADMIN — AMPICILLIN SODIUM AND SULBACTAM SODIUM 3 G: 2; 1 INJECTION, POWDER, FOR SOLUTION INTRAMUSCULAR; INTRAVENOUS at 09:24

## 2025-05-26 RX ADMIN — DOCUSATE SODIUM 100 MG: 50 LIQUID ORAL at 21:34

## 2025-05-26 RX ADMIN — ACETAMINOPHEN 1000 MG: 160 SOLUTION ORAL at 00:40

## 2025-05-26 RX ADMIN — LISINOPRIL 10 MG: 20 TABLET ORAL at 15:54

## 2025-05-26 RX ADMIN — VALACYCLOVIR 1000 MG: 500 TABLET, FILM COATED ORAL at 21:34

## 2025-05-26 RX ADMIN — OXYCODONE HYDROCHLORIDE 10 MG: 5 SOLUTION ORAL at 15:53

## 2025-05-26 RX ADMIN — IBUPROFEN 600 MG: 200 SUSPENSION ORAL at 18:16

## 2025-05-26 RX ADMIN — ACETAMINOPHEN 1000 MG: 160 SOLUTION ORAL at 16:01

## 2025-05-26 RX ADMIN — AMPICILLIN SODIUM AND SULBACTAM SODIUM 3 G: 2; 1 INJECTION, POWDER, FOR SOLUTION INTRAMUSCULAR; INTRAVENOUS at 03:20

## 2025-05-26 RX ADMIN — AMPICILLIN SODIUM AND SULBACTAM SODIUM 3 G: 2; 1 INJECTION, POWDER, FOR SOLUTION INTRAMUSCULAR; INTRAVENOUS at 15:53

## 2025-05-26 RX ADMIN — MUPIROCIN: 20 OINTMENT TOPICAL at 21:34

## 2025-05-26 RX ADMIN — AMPICILLIN SODIUM AND SULBACTAM SODIUM 3 G: 2; 1 INJECTION, POWDER, FOR SOLUTION INTRAMUSCULAR; INTRAVENOUS at 21:35

## 2025-05-26 RX ADMIN — ACETAMINOPHEN 1000 MG: 160 SOLUTION ORAL at 09:24

## 2025-05-26 RX ADMIN — ESOMEPRAZOLE MAGNESIUM 40 MG: 40 FOR SUSPENSION ORAL at 06:19

## 2025-05-26 RX ADMIN — ENOXAPARIN SODIUM 40 MG: 100 INJECTION SUBCUTANEOUS at 21:34

## 2025-05-26 RX ADMIN — DOCUSATE SODIUM 100 MG: 50 LIQUID ORAL at 09:24

## 2025-05-26 RX ADMIN — OXYCODONE HYDROCHLORIDE 10 MG: 5 SOLUTION ORAL at 06:50

## 2025-05-26 RX ADMIN — OXYCODONE HYDROCHLORIDE 10 MG: 5 SOLUTION ORAL at 00:40

## 2025-05-26 ASSESSMENT — PAIN SCALES - GENERAL
PAINLEVEL_OUTOF10: 7
PAINLEVEL_OUTOF10: 2
PAINLEVEL_OUTOF10: 5 - MODERATE PAIN
PAINLEVEL_OUTOF10: 3
PAINLEVEL_OUTOF10: 7
PAINLEVEL_OUTOF10: 2

## 2025-05-26 ASSESSMENT — PAIN - FUNCTIONAL ASSESSMENT
PAIN_FUNCTIONAL_ASSESSMENT: 0-10

## 2025-05-26 ASSESSMENT — COGNITIVE AND FUNCTIONAL STATUS - GENERAL
WALKING IN HOSPITAL ROOM: A LITTLE
DAILY ACTIVITIY SCORE: 21
EATING MEALS: A LITTLE
MOBILITY SCORE: 23
PERSONAL GROOMING: A LITTLE
DRESSING REGULAR UPPER BODY CLOTHING: A LITTLE

## 2025-05-26 ASSESSMENT — ENCOUNTER SYMPTOMS
FEVER: 0
COLOR CHANGE: 1
CHILLS: 0

## 2025-05-26 NOTE — PROGRESS NOTES
ENT DAILY PROGRESS NOTE  Name: Rosendo Carmona  MRN: [id number]  : 1965     Identification Statement  The patient is a 59 y.o. male who underwent MicroDirect laryngoscopy, esophagoscopy, endoscopic Zenker's diverticulectomy, CO2 laser and Dobbhoff tube placement on 25 with Dr. White.     Subjective  Secretions and pain improved from yesterday. Some subjective improvement in swelling. Voice more normal as well. He remains NPO. No fever or chills.    Noted rash on his back. Bps persistently elevated.     Objective  Temp:  [36.1 °C (97 °F)-37.6 °C (99.7 °F)] 36.9 °C (98.4 °F)  Heart Rate:  [69-83] 73  Resp:  [16-20] 16  BP: (115-169)/(66-93) 141/82  Gen: Alert, oriented, no acute distress  Resp: Breathing comfortably on room air, no stridor  Head: Atraumatic, normocephalic  Oral Cavity: MMM  Ears: Normal external ears  Nose: External nose midline   Neck: Crepitus upon palpation, mostly over right clavicle which is stable    Intake/Output Summary (Last 24 hours) at 2025 1629  Last data filed at 2025 1500      Gross per 24 hour   Intake 3678.33 ml   Output 1125 ml   Net 2553.33 ml         Labs     Assessment  The patient is a 59 y.o. male who underwent microDirect laryngoscopy, esophagoscopy, endoscopic Zenker's diverticulectomy, CO2 laser and Dobbhoff tube placement on 25 with Dr. White. Patient with post op course complicated by pneumomediastinum and pain. Some concern for possible contained leak on esophagram from .     Active Problems:  - Zenker's diverticulum status post surgical repair     Plan:  - new onset vesicular rash, consult derm  - Analgesia: Scheduled acetaminophen q8h,  Oxycodone 5mg & 10 mg q6h prn, Dilaudid 0.2 q3h prn  - ID: Unasyn 3g q6h  - Resp: wean O2 as able  - CV: monitor vitals; hypertensive, medicine consult  - GI: Bowel regimen, PPI and PRN Anti-emetic   - FEN: NPO until repeat Gastrografin esophagram on ;  mL/hr continuous  - : Voiding spontaneously   -  Heme: Trend CBC and RFP  - Endo: SSI   - Embolic PPx: SQH, SCDs while in bed  - Dispo: continued care on SCC5;      Anastasia Amaya MD PGY4  Otolaryngology - Head & Neck Surgery  ENT Consult pager: [phone]  Peds pager: [phone]  Adult Head & Neck Phone: [phone]  ENT subspecialty team: [place] individual resident who wrote today's note  Please page if urgent

## 2025-05-26 NOTE — CONSULTS
Inpatient consult to Dermatology  Consult performed by: Kell Cronin DO  Consult ordered by: Marcelle White MD      DERMATOLOGY DEPARTMENT CONSULTATION NOTE  Name: Rosendo Delacruz  MRN: [id number]  : 1965    Reason for consultation: Nonpainful vesicular rash on back    History of Present Illness  Rosendo Delacruz is a 59 y.o. male with a past medical history of zenker's diverticulum presented on 2025 as direct admission for microDirect laryngoscopy, esophagoscopy, endoscopic Zenker's diverticulectomy, CO2 laser and Dobbhoff tube placement on 25 with Dr. White. Post-operative course is c/b esophageal leak, hypertension, and a rash. Dermatology was consulted for a nonpainful vesicular rash on his back.    Patient reports rash started yesterday on his back. He also notes one lesion on his right groin which he thinks may have been present for longer. Rash is mildly itchy but not painful. He notes a history of chicken pox as a child. He does not have a history of herpes zoster (shingles) and has not been previously vaccinated.     Review of Systems  Review of Systems   Constitutional:  Negative for chills and fever.   Skin:  Positive for color change and rash.        Past Medical History  Medical History[1]    Past Surgical History   has a past surgical history that includes Other surgical history (2020); Hernia repair; Tonsillectomy; Colonoscopy; and Esophagogastroduodenoscopy.     Allergies  Allergies[2]    Medications  Scheduled Meds: Scheduled Medications[3]   Continuous Infusions: Continuous Medications[4]   PRN Meds: PRN Medications[5]     Family History  Family History[6]    Social History   reports that he has quit smoking. His smoking use included cigars. He has never used smokeless tobacco. He reports current alcohol use. He reports current drug use. Drug: Marijuana.     Objective    Vitals:    25 0031 25 0140 25 0323 25 0750   BP: (!) 188/96 169/90 153/85 (!)  177/93   BP Location: Right arm  Right arm Right arm   Patient Position: Lying  Lying Lying   Pulse: 67  65 64   Resp: 16  16 16   Temp: 36.8 °C (98.2 °F)  36.7 °C (98.1 °F) 36.6 °C (97.9 °F)   TempSrc: Temporal  Temporal Temporal   SpO2: 98%  99% 100%   Weight:       Height:            Exam    GEN: no acute distress  NEURO: moving all extremities   EYES: conjunctiva and eyelids normal.   ENT:   - Lips: normal  NECK: normal and symmetric.   CV: no varicosities, warmth or tenderness of extremities.  GI: Flat abdomen.   EXTREMITIES: no distal digital clubbing, cyanosis, petechiae   SKIN: A full body skin exam including scalp, face, eyes, ears, neck, trunk, bilateral upper & lower extremities were examined with the following findings:  -On the left upper back, there is a grouping of erythematous papulovesicles with surrounding erythema in a somewhat linear distribution. One papulovesicle also present on the right abdomen.   -On the right superior medial thigh, , there is a firm red nodule.       Laboratory and Data  Results for orders placed or performed during the hospital encounter of 05/22/25 (from the past 24 hours)   CBC   Result Value Ref Range    WBC 7.2 4.4 - 11.3 x10*3/uL    nRBC 0.0 0.0 - 0.0 /100 WBCs    RBC 3.84 (L) 4.50 - 5.90 x10*6/uL    Hemoglobin 11.9 (L) 13.5 - 17.5 g/dL    Hematocrit 35.2 (L) 41.0 - 52.0 %    MCV 92 80 - 100 fL    MCH 31.0 26.0 - 34.0 pg    MCHC 33.8 32.0 - 36.0 g/dL    RDW 12.5 11.5 - 14.5 %    Platelets 235 150 - 450 x10*3/uL   Renal function panel   Result Value Ref Range    Glucose 106 (H) 74 - 99 mg/dL    Sodium 139 136 - 145 mmol/L    Potassium 3.5 3.5 - 5.3 mmol/L    Chloride 103 98 - 107 mmol/L    Bicarbonate 30 21 - 32 mmol/L    Anion Gap 10 10 - 20 mmol/L    Urea Nitrogen 9 6 - 23 mg/dL    Creatinine 0.73 0.50 - 1.30 mg/dL    eGFR >90 >60 mL/min/1.73m*2    Calcium 9.0 8.6 - 10.6 mg/dL    Phosphorus 3.8 2.5 - 4.9 mg/dL    Albumin 3.6 3.4 - 5.0 g/dL   Magnesium   Result Value  Ref Range    Magnesium 1.99 1.60 - 2.40 mg/dL        Assessment/Plan   Rosendo Delacruz is a 59 y.o. male with a past medical history of zenker's diverticulum presented on 5/22/2025 as direct admission for microDirect laryngoscopy, esophagoscopy, endoscopic Zenker's diverticulectomy, CO2 laser and Dobbhoff tube placement on 5/22/25 with Dr. White. Dermatology was consulted for a nonpainful vesicular rash on his back.    Differential diagnoses:   Favor herpes zoster  Furuncle    Impression:     Herpes zoster (shingles) is reactivation of a latent infection with the varicella-zoster virus (VZV). After primary infection (chickenpox), the virus lays dormant in dorsal root ganglia for life. Reactivation may be triggered by immunosuppression, certain medications, other infections, or physical or emotional stress.     Lesions typically present as grouped or confluent vesicles with scalloped borders on an erythematous base typically confined to a distinct dermatome and not crossing the midline. Early lesions may be grouped urticarial papules and plaques. Patient's lesions on his left upper back are consistent with early lesions seen in VZV. Additionally, patient reports history of chickenpox as a child and has not received the shingles vaccine. Viral swabs for VZV and HSV submitted. Recommend starting empiric treatment.     The lesion on patient's right superior medial thigh is consistent with a furuncle which is an abscess associated with a hair follicle. Recommend topical antibiotic treatment as below.     Recommendations:  -VZV/HSV swab performed.  -Recommend starting valacyclovir 1g TID x 7 days empirically until viral swab results.   -For furuncle on patient's right superior medial thigh, start mupirocin three times per day x 7-10 days.     The patient was seen and discussed with attending physician Dr. Hoyos. The assessment and plan was communicated to the care team.    Thank you for the consultation and for the  opportunity to contribute to the care of this patient.      Kell Cronin DO   PGY-3, Dermatology  Epic chat (preferred)  Team pager [phone]     Attestation:  I saw and evaluated the patient. I personally obtained the key and critical portions of the history and physical exam or was physically present for key and critical portions performed by the resident/fellow. I reviewed the resident/fellow's documentation and discussed the patient with the resident/fellow. I agree with the resident/fellow's medical decision making as documented in the note.    The patient's presentation is most clinically consistent with VZV affecting the C7/C8 dermatome. The broader differential diagnosis includes a contact dermatitis versus miliaria. Given suspicion for VZV, I recommend empiric treatment for VZV as outlined above. We will continue to follow along at this time. Thank you for the consultation.    Heather Mccall MD  5/26/2025             [1]   Past Medical History:  Diagnosis Date    Arrhythmia     Afib h/o Cardioversion 2018    Arthritis     Cardiomyopathy, nonischemic (Multi)     Dysphagia     Dysphagia in setting of Zenker's diverticulum    Irregular heart beat     Personal history of other diseases of the musculoskeletal system and connective tissue 07/29/2020    History of tendinitis    Personal history of other infectious and parasitic diseases 07/29/2020    History of dermatophytosis    Strain of unspecified muscle(s) and tendon(s) at lower leg level, right leg, initial encounter 09/02/2020    Strain of right knee, initial encounter    Tinnitus     Vision loss     corrective lens   [2] No Known Allergies  [3] acetaminophen, 1,000 mg, nasogastric tube, q8h SHERITA  ampicillin-sulbactam, 3 g, intravenous, q6h  docusate sodium, 100 mg, nasogastric tube, BID  enoxaparin, 40 mg, subcutaneous, q24h  esomeprazole, 40 mg, nasogastric tube, Daily before breakfast  [4] lactated Ringer's, 100 mL/hr, Last Rate: 100 mL/hr  (05/25/25 1235)  [5] PRN medications: HYDROmorphone, naloxone, ondansetron ODT **OR** ondansetron, oxyCODONE, oxyCODONE, polyethylene glycol  [6]   Family History  Problem Relation Name Age of Onset    Hypertension Mother      Heart attack Father      Cushing syndrome Brother      Cancer Brother Ike

## 2025-05-26 NOTE — CARE PLAN
The patient's goals for the shift include      The clinical goals for the shift include Pt will  have improve skin integrity    Problem: Chronic Conditions and Co-morbidities  Goal: Patient's chronic conditions and co-morbidity symptoms are monitored and maintained or improved  Outcome: Progressing     Problem: Pain - Adult  Goal: Verbalizes/displays adequate comfort level or baseline comfort level  Outcome: Progressing     Problem: Fall/Injury  Goal: Be free from injury by end of the shift  Outcome: Progressing     Problem: Fall/Injury  Goal: Verbalize understanding of risk factor reduction measures to prevent injury from fall in the home  Outcome: Progressing

## 2025-05-26 NOTE — CONSULTS
Consults    Reason For Consult  Hypertension    History Of Present Illness  Rosendo Delacruz is a 59 y.o. male with PMHx afib (s/p ablation 2018) and dysphagia 2/2 zenker diverticulum s/p endoscopic laser and repair with Dr. White on 5/22, with course complicated by c/f esophageal leak, possible shingles, and post-op hypertension. Medicine consulted for post-op hypertension.    Patient reports intermittent headache. He states that most of his pain is located in his neck near the procedure and is typically around a 4/10, but can increase at times. He denies chest pain, SOB, n/v, abdominal pain, LE edema.         Past Medical History  He has a past medical history of Arrhythmia, Arthritis, Cardiomyopathy, nonischemic (Multi), Dysphagia, Irregular heart beat, Personal history of other diseases of the musculoskeletal system and connective tissue (07/29/2020), Personal history of other infectious and parasitic diseases (07/29/2020), Strain of unspecified muscle(s) and tendon(s) at lower leg level, right leg, initial encounter (09/02/2020), Tinnitus, and Vision loss.    Surgical History  He has a past surgical history that includes Other surgical history (07/29/2020); Hernia repair; Tonsillectomy; Colonoscopy; and Esophagogastroduodenoscopy.     Social History  He reports that he has quit smoking. His smoking use included cigars. He has never used smokeless tobacco. He reports current alcohol use. He reports current drug use. Drug: Marijuana.    Family History  Family History[1]     Allergies  Patient has no known allergies.    Review of Systems     Physical Exam     Last Recorded Vitals  /87 (BP Location: Right arm, Patient Position: Lying)   Pulse 66   Temp 37.4 °C (99.3 °F) (Temporal)   Resp 16   Wt 88.3 kg (194 lb 10.7 oz)   SpO2 95%     Relevant Results  Results for orders placed or performed during the hospital encounter of 05/22/25 (from the past 24 hours)   CBC   Result Value Ref Range    WBC 7.2 4.4 -  11.3 x10*3/uL    nRBC 0.0 0.0 - 0.0 /100 WBCs    RBC 3.84 (L) 4.50 - 5.90 x10*6/uL    Hemoglobin 11.9 (L) 13.5 - 17.5 g/dL    Hematocrit 35.2 (L) 41.0 - 52.0 %    MCV 92 80 - 100 fL    MCH 31.0 26.0 - 34.0 pg    MCHC 33.8 32.0 - 36.0 g/dL    RDW 12.5 11.5 - 14.5 %    Platelets 235 150 - 450 x10*3/uL   Renal function panel   Result Value Ref Range    Glucose 106 (H) 74 - 99 mg/dL    Sodium 139 136 - 145 mmol/L    Potassium 3.5 3.5 - 5.3 mmol/L    Chloride 103 98 - 107 mmol/L    Bicarbonate 30 21 - 32 mmol/L    Anion Gap 10 10 - 20 mmol/L    Urea Nitrogen 9 6 - 23 mg/dL    Creatinine 0.73 0.50 - 1.30 mg/dL    eGFR >90 >60 mL/min/1.73m*2    Calcium 9.0 8.6 - 10.6 mg/dL    Phosphorus 3.8 2.5 - 4.9 mg/dL    Albumin 3.6 3.4 - 5.0 g/dL   Magnesium   Result Value Ref Range    Magnesium 1.99 1.60 - 2.40 mg/dL     FL GI esophagram   Final Result   Exam limitation due to limited pre and post  images because the   x-ray machine in the fluoroscopy suite was not working.        1. Contrast filling of the posterior upper 3rd of the esophagus at   the patient's previously known site of Zenker's diverticulum   suggestive of a residual diverticulum. No evidence of extraluminal   contrast extravasation to suggest a postoperative leak.   2. Tracheal aspiration of the oral contrast. Recommend swallow   evaluation with speech pathology.        I personally reviewed the images/study and I agree with the findings   as stated by Maite Esparza DO, PGY-3. This study was interpreted   at [facility], [place], [place].        MACRO:   Maite Esparza discussed the significance and urgency of this   critical finding by Epic Chat with  SANDRA TEJADA on 5/25/2025 at 1:29   pm.  (**-RCF-**) Findings:  See findings.             Signed by: Westley Jay 5/25/2025 3:38 PM   Dictation workstation:   [id number]      XR chest 1 view   Final Result   1. Slightly increased interstitial pulmonary edema in  comparison to   previous study   2. Increased subsegmental atelectasis right lower lobe and to a   lesser degree left lower lobe. Small left pleural effusion may be   present.   3. Increased subcutaneous emphysema within the neck right greater   than left   4. Pneumomediastinum                  I personally reviewed the images/study and I agree with the findings   as stated by Dr. Mayito Johnson. This study was interpreted at [place], [place].        MACRO:   Mayito Johnson discussed the significance and urgency of this critical   finding by epic secure chat with  SANDRA TEJADA on 5/23/2025 at 5:06 am.   (**-RCF-**) Findings:  See findings.        Signed by: Rosendo Rosas 5/23/2025 1:21 PM   Dictation workstation:   [id number]      XR abdomen 1 view   Final Result   Chest:   No evidence of pneumomediastinum. No acute cardiopulmonary process.   Subcutaneous air at the neck likely related to recent surgery        Abdomen:   Interval placement of Dobbhoff tube which terminates in the gastric   body        I personally reviewed the images/study and I agree with the findings   as stated by Modesto Villarreal MD, PGY-2 this study was interpreted at   [place], [place].        MACRO:   None        Signed by: Rosendo Rosas 5/22/2025 11:56 AM   Dictation workstation:   [id number]      XR chest 1 view   Final Result   Chest:   No evidence of pneumomediastinum. No acute cardiopulmonary process.   Subcutaneous air at the neck likely related to recent surgery        Abdomen:   Interval placement of Dobbhoff tube which terminates in the gastric   body        I personally reviewed the images/study and I agree with the findings   as stated by Modesto Villarreal MD, PGY-2 this study was interpreted at   [place], [place].        MACRO:   None        Signed by: Rosendo Rosas 5/22/2025 11:56 AM   Dictation workstation:    [id number]             Assessment/Plan     Rosendo Delacruz is a 59 y.o. male with PMHx afib (s/p ablation 2018) and dysphagia 2/2 zenker diverticulum s/p endoscopic laser and repair with Dr. White on 5/22, with course complicated by c/f esophageal leak, possible shingles, and post-op hypertension. Medicine consulted for post-op hypertension.    #Hypertension   :: Per outpatient record/Patient verbal report, his BP has been ranging in 140s/90s most recently  :: TTE (5/19) - normal biventricular function, mild LVH, no valvulopathies   Plan:  - Recommend starting lisinopril 10 mg daily  - Recommend hydralazine 10 mg PO q8h prn for SBP>180  - Ensure adequate pain control - defer pain regimen to primary team      Medicine will continue to follow. Please reach out with questions.     Plan discussed and patient seen by attending physician Dr. Garsia.       Jonathan Holly DO             [1]   Family History  Problem Relation Name Age of Onset    Hypertension Mother      Heart attack Father      Cushing syndrome Brother      Cancer Brother Ike

## 2025-05-26 NOTE — CARE PLAN
Problem: Pain - Adult  Goal: Verbalizes/displays adequate comfort level or baseline comfort level  Outcome: Progressing     Problem: Safety - Adult  Goal: Free from fall injury  Outcome: Progressing     Problem: Discharge Planning  Goal: Discharge to home or other facility with appropriate resources  Outcome: Progressing     Problem: Chronic Conditions and Co-morbidities  Goal: Patient's chronic conditions and co-morbidity symptoms are monitored and maintained or improved  Outcome: Progressing     Problem: Nutrition  Goal: Nutrient intake appropriate for maintaining nutritional needs  Outcome: Progressing     Problem: Fall/Injury  Goal: Not fall by end of shift  Outcome: Progressing  Goal: Be free from injury by end of the shift  Outcome: Progressing  Goal: Verbalize understanding of personal risk factors for fall in the hospital  Outcome: Progressing  Goal: Verbalize understanding of risk factor reduction measures to prevent injury from fall in the home  Outcome: Progressing  Goal: Pace activities to prevent fatigue by end of the shift  Outcome: Progressing   The patient's goals for the shift include      The clinical goals for the shift include Patient will remain HDS and VSS throughout shift

## 2025-05-27 ENCOUNTER — APPOINTMENT (OUTPATIENT)
Dept: RADIOLOGY | Facility: HOSPITAL | Age: 60
DRG: 982 | End: 2025-05-27
Payer: COMMERCIAL

## 2025-05-27 LAB
ALBUMIN SERPL BCP-MCNC: 3.7 G/DL (ref 3.4–5)
ANION GAP SERPL CALC-SCNC: 10 MMOL/L (ref 10–20)
BUN SERPL-MCNC: 11 MG/DL (ref 6–23)
CALCIUM SERPL-MCNC: 9.3 MG/DL (ref 8.6–10.6)
CHLORIDE SERPL-SCNC: 104 MMOL/L (ref 98–107)
CO2 SERPL-SCNC: 29 MMOL/L (ref 21–32)
CREAT SERPL-MCNC: 0.7 MG/DL (ref 0.5–1.3)
EGFRCR SERPLBLD CKD-EPI 2021: >90 ML/MIN/1.73M*2
ERYTHROCYTE [DISTWIDTH] IN BLOOD BY AUTOMATED COUNT: 12.5 % (ref 11.5–14.5)
GLUCOSE SERPL-MCNC: 99 MG/DL (ref 74–99)
HCT VFR BLD AUTO: 35.9 % (ref 41–52)
HGB BLD-MCNC: 12.3 G/DL (ref 13.5–17.5)
HSV1 DNA SKIN QL NAA+PROBE: NOT DETECTED
HSV2 DNA SKIN QL NAA+PROBE: NOT DETECTED
MAGNESIUM SERPL-MCNC: 2.07 MG/DL (ref 1.6–2.4)
MCH RBC QN AUTO: 31.3 PG (ref 26–34)
MCHC RBC AUTO-ENTMCNC: 34.3 G/DL (ref 32–36)
MCV RBC AUTO: 91 FL (ref 80–100)
NRBC BLD-RTO: 0 /100 WBCS (ref 0–0)
PHOSPHATE SERPL-MCNC: 3.5 MG/DL (ref 2.5–4.9)
PLATELET # BLD AUTO: 267 X10*3/UL (ref 150–450)
POTASSIUM SERPL-SCNC: 3.4 MMOL/L (ref 3.5–5.3)
RBC # BLD AUTO: 3.93 X10*6/UL (ref 4.5–5.9)
SODIUM SERPL-SCNC: 140 MMOL/L (ref 136–145)
VZV DNA SPEC QL NAA+PROBE: NOT DETECTED
WBC # BLD AUTO: 5 X10*3/UL (ref 4.4–11.3)

## 2025-05-27 PROCEDURE — 2500000001 HC RX 250 WO HCPCS SELF ADMINISTERED DRUGS (ALT 637 FOR MEDICARE OP)

## 2025-05-27 PROCEDURE — 74220 X-RAY XM ESOPHAGUS 1CNTRST: CPT

## 2025-05-27 PROCEDURE — 99024 POSTOP FOLLOW-UP VISIT: CPT | Performed by: OTOLARYNGOLOGY

## 2025-05-27 PROCEDURE — 83735 ASSAY OF MAGNESIUM: CPT | Performed by: STUDENT IN AN ORGANIZED HEALTH CARE EDUCATION/TRAINING PROGRAM

## 2025-05-27 PROCEDURE — 2500000004 HC RX 250 GENERAL PHARMACY W/ HCPCS (ALT 636 FOR OP/ED): Mod: JZ | Performed by: STUDENT IN AN ORGANIZED HEALTH CARE EDUCATION/TRAINING PROGRAM

## 2025-05-27 PROCEDURE — 2500000001 HC RX 250 WO HCPCS SELF ADMINISTERED DRUGS (ALT 637 FOR MEDICARE OP): Performed by: STUDENT IN AN ORGANIZED HEALTH CARE EDUCATION/TRAINING PROGRAM

## 2025-05-27 PROCEDURE — 36415 COLL VENOUS BLD VENIPUNCTURE: CPT | Performed by: STUDENT IN AN ORGANIZED HEALTH CARE EDUCATION/TRAINING PROGRAM

## 2025-05-27 PROCEDURE — 2500000004 HC RX 250 GENERAL PHARMACY W/ HCPCS (ALT 636 FOR OP/ED): Mod: JZ

## 2025-05-27 PROCEDURE — 1170000001 HC PRIVATE ONCOLOGY ROOM DAILY

## 2025-05-27 PROCEDURE — 2500000002 HC RX 250 W HCPCS SELF ADMINISTERED DRUGS (ALT 637 FOR MEDICARE OP, ALT 636 FOR OP/ED)

## 2025-05-27 PROCEDURE — 2550000001 HC RX 255 CONTRASTS: Mod: JZ | Performed by: OTOLARYNGOLOGY

## 2025-05-27 PROCEDURE — 80069 RENAL FUNCTION PANEL: CPT | Performed by: STUDENT IN AN ORGANIZED HEALTH CARE EDUCATION/TRAINING PROGRAM

## 2025-05-27 PROCEDURE — 74220 X-RAY XM ESOPHAGUS 1CNTRST: CPT | Performed by: STUDENT IN AN ORGANIZED HEALTH CARE EDUCATION/TRAINING PROGRAM

## 2025-05-27 PROCEDURE — 85027 COMPLETE CBC AUTOMATED: CPT | Performed by: STUDENT IN AN ORGANIZED HEALTH CARE EDUCATION/TRAINING PROGRAM

## 2025-05-27 PROCEDURE — 99232 SBSQ HOSP IP/OBS MODERATE 35: CPT

## 2025-05-27 RX ORDER — LISINOPRIL 20 MG/1
10 TABLET ORAL ONCE
Status: COMPLETED | OUTPATIENT
Start: 2025-05-27 | End: 2025-05-27

## 2025-05-27 RX ORDER — LISINOPRIL 20 MG/1
20 TABLET ORAL DAILY
Status: DISCONTINUED | OUTPATIENT
Start: 2025-05-28 | End: 2025-05-28 | Stop reason: HOSPADM

## 2025-05-27 RX ADMIN — ACETAMINOPHEN 650 MG: 160 SOLUTION ORAL at 12:14

## 2025-05-27 RX ADMIN — ACETAMINOPHEN 650 MG: 160 SOLUTION ORAL at 00:41

## 2025-05-27 RX ADMIN — ENOXAPARIN SODIUM 40 MG: 100 INJECTION SUBCUTANEOUS at 22:23

## 2025-05-27 RX ADMIN — AMPICILLIN SODIUM AND SULBACTAM SODIUM 3 G: 2; 1 INJECTION, POWDER, FOR SOLUTION INTRAMUSCULAR; INTRAVENOUS at 08:04

## 2025-05-27 RX ADMIN — AMPICILLIN SODIUM AND SULBACTAM SODIUM 3 G: 2; 1 INJECTION, POWDER, FOR SOLUTION INTRAMUSCULAR; INTRAVENOUS at 22:23

## 2025-05-27 RX ADMIN — IBUPROFEN 600 MG: 200 SUSPENSION ORAL at 12:15

## 2025-05-27 RX ADMIN — ACETAMINOPHEN 650 MG: 160 SOLUTION ORAL at 17:45

## 2025-05-27 RX ADMIN — IBUPROFEN 600 MG: 200 SUSPENSION ORAL at 17:45

## 2025-05-27 RX ADMIN — ESOMEPRAZOLE MAGNESIUM 40 MG: 40 FOR SUSPENSION ORAL at 06:51

## 2025-05-27 RX ADMIN — VALACYCLOVIR 1000 MG: 500 TABLET, FILM COATED ORAL at 22:29

## 2025-05-27 RX ADMIN — VALACYCLOVIR 1000 MG: 500 TABLET, FILM COATED ORAL at 06:51

## 2025-05-27 RX ADMIN — DOCUSATE SODIUM 100 MG: 50 LIQUID ORAL at 22:23

## 2025-05-27 RX ADMIN — ACETAMINOPHEN 650 MG: 160 SOLUTION ORAL at 23:33

## 2025-05-27 RX ADMIN — IOHEXOL 60 ML: 240 INJECTION, SOLUTION INTRATHECAL; INTRAVASCULAR; INTRAVENOUS; ORAL at 11:37

## 2025-05-27 RX ADMIN — MUPIROCIN: 20 OINTMENT TOPICAL at 14:47

## 2025-05-27 RX ADMIN — VALACYCLOVIR 1000 MG: 500 TABLET, FILM COATED ORAL at 14:48

## 2025-05-27 RX ADMIN — AMPICILLIN SODIUM AND SULBACTAM SODIUM 3 G: 2; 1 INJECTION, POWDER, FOR SOLUTION INTRAMUSCULAR; INTRAVENOUS at 14:47

## 2025-05-27 RX ADMIN — AMPICILLIN SODIUM AND SULBACTAM SODIUM 3 G: 2; 1 INJECTION, POWDER, FOR SOLUTION INTRAMUSCULAR; INTRAVENOUS at 03:22

## 2025-05-27 RX ADMIN — IBUPROFEN 600 MG: 200 SUSPENSION ORAL at 00:41

## 2025-05-27 RX ADMIN — IBUPROFEN 600 MG: 200 SUSPENSION ORAL at 06:51

## 2025-05-27 RX ADMIN — MUPIROCIN: 20 OINTMENT TOPICAL at 08:04

## 2025-05-27 RX ADMIN — IBUPROFEN 600 MG: 200 SUSPENSION ORAL at 23:34

## 2025-05-27 RX ADMIN — LISINOPRIL 10 MG: 20 TABLET ORAL at 08:04

## 2025-05-27 RX ADMIN — MUPIROCIN: 20 OINTMENT TOPICAL at 22:31

## 2025-05-27 RX ADMIN — LISINOPRIL 10 MG: 20 TABLET ORAL at 17:45

## 2025-05-27 RX ADMIN — ACETAMINOPHEN 650 MG: 160 SOLUTION ORAL at 06:51

## 2025-05-27 ASSESSMENT — COGNITIVE AND FUNCTIONAL STATUS - GENERAL
DRESSING REGULAR UPPER BODY CLOTHING: A LITTLE
EATING MEALS: A LITTLE
DAILY ACTIVITIY SCORE: 21
PERSONAL GROOMING: A LITTLE
MOBILITY SCORE: 23
WALKING IN HOSPITAL ROOM: A LITTLE

## 2025-05-27 ASSESSMENT — PAIN - FUNCTIONAL ASSESSMENT: PAIN_FUNCTIONAL_ASSESSMENT: 0-10

## 2025-05-27 ASSESSMENT — PAIN SCALES - GENERAL
PAINLEVEL_OUTOF10: 0 - NO PAIN
PAINLEVEL_OUTOF10: 3

## 2025-05-27 NOTE — CONSULTS
Nutrition Note:   ---->Nutrition Re-Consult for TF    This service originally completed full nutrition assessment with continuous and bolus TF recs on 05/23. Please see that note for additional information. Noted plans for repeat Esophagram today.     Will continue to follow.        Time Spent (min): 15 minutes

## 2025-05-27 NOTE — PROGRESS NOTES
ENT DAILY PROGRESS NOTE  Name: Rosendo Carmona  MRN: [id number]  : 1965     Identification Statement  The patient is a 59 y.o. male who underwent MicroDirect laryngoscopy, esophagoscopy, endoscopic Zenker's diverticulectomy, CO2 laser and Dobbhoff tube placement on 25 with Dr. White.     Subjective  Some subjective improvement in swelling. Voice more normal as well. He remains NPO. No fever or chills.    Bps persistently elevated still.      Objective  Temp:  [36.1 °C (97 °F)-37.6 °C (99.7 °F)] 36.9 °C (98.4 °F)  Heart Rate:  [69-83] 73  Resp:  [16-20] 16  BP: (115-169)/(66-93) 141/82  Gen: Alert, oriented, no acute distress  Resp: Breathing comfortably on room air, no stridor  Head: Atraumatic, normocephalic  Oral Cavity: MMM  Ears: Normal external ears  Nose: External nose midline   Neck: Crepitus upon palpation, mostly over right clavicle which is stable    Intake/Output Summary (Last 24 hours) at 2025 1629  Last data filed at 2025 1500      Gross per 24 hour   Intake 3678.33 ml   Output 1125 ml   Net 2553.33 ml         Labs     Assessment  The patient is a 59 y.o. male who underwent microDirect laryngoscopy, esophagoscopy, endoscopic Zenker's diverticulectomy, CO2 laser and Dobbhoff tube placement on 25 with Dr. White. Patient with post op course complicated by pneumomediastinum and pain. Some concern for possible contained leak on esophagram from .     Active Problems:  - Zenker's diverticulum status post surgical repair     Plan:  - new onset vesicular rash, VZV negative   - Analgesia: Scheduled acetaminophen q8h,  Oxycodone 5mg & 10 mg q6h prn, Dilaudid 0.2 q3h prn  - ID: Unasyn 3g q6h  - Resp: wean O2 as able  - CV: monitor vitals; hypertensive, medicine consult  - GI: Bowel regimen, PPI and PRN Anti-emetic   - FEN: + CLD  - : Voiding spontaneously   - Heme: Trend CBC and RFP  - Endo: SSI   - Embolic PPx: SQH, SCDs while in bed  - Dispo: continued care on SCC5;      Ute GUPTA  MD Grupo

## 2025-05-27 NOTE — CARE PLAN
The clinical goals for the shift include remain safe/free from falls.  Pt ambulates steadily in his room, to the bathroom, and in hallways. Safety precautions maintained.

## 2025-05-27 NOTE — PROGRESS NOTES
Rosendo Delacruz is a 59 y.o. male on day 4 of admission presenting with Zenker's diverticulum.    Subjective   He states that most of his pain is located in his neck near the procedure and is typically around a 4/10, but can increase at times. He denies HA, chest pain, SOB, n/v, abdominal pain, LE edema.         Objective     Physical Exam  Constitutional:       General: He is not in acute distress.  HENT:      Head: Normocephalic and atraumatic.      Mouth/Throat:      Mouth: Mucous membranes are moist.      Pharynx: Oropharynx is clear.   Cardiovascular:      Rate and Rhythm: Normal rate and regular rhythm.      Pulses: Normal pulses.      Heart sounds: Normal heart sounds. No murmur heard.     No gallop.   Pulmonary:      Effort: Pulmonary effort is normal. No respiratory distress.      Breath sounds: Normal breath sounds. No wheezing or rales.   Abdominal:      General: Abdomen is flat.      Palpations: Abdomen is soft.      Tenderness: There is no abdominal tenderness.   Musculoskeletal:      Right lower leg: No edema.      Left lower leg: No edema.   Skin:     General: Skin is warm and dry.   Neurological:      Mental Status: He is alert.         Last Recorded Vitals  Blood pressure (!) 170/95, pulse 64, temperature 36.7 °C (98.1 °F), temperature source Temporal, resp. rate 16, height 1.829 m (6'), weight 88.3 kg (194 lb 10.7 oz), SpO2 94%.  Intake/Output last 3 Shifts:  I/O last 3 completed shifts:  In: 2328.3 (26.4 mL/kg) [I.V.:533.3 (6 mL/kg); NG/GT:1195; IV Piggyback:600]  Out: - (0 mL/kg)   Weight: 88.3 kg     Relevant Results  Results for orders placed or performed during the hospital encounter of 05/22/25 (from the past 24 hours)   CBC   Result Value Ref Range    WBC 5.0 4.4 - 11.3 x10*3/uL    nRBC 0.0 0.0 - 0.0 /100 WBCs    RBC 3.93 (L) 4.50 - 5.90 x10*6/uL    Hemoglobin 12.3 (L) 13.5 - 17.5 g/dL    Hematocrit 35.9 (L) 41.0 - 52.0 %    MCV 91 80 - 100 fL    MCH 31.3 26.0 - 34.0 pg    MCHC 34.3 32.0 -  36.0 g/dL    RDW 12.5 11.5 - 14.5 %    Platelets 267 150 - 450 x10*3/uL   Renal Function Panel   Result Value Ref Range    Glucose 99 74 - 99 mg/dL    Sodium 140 136 - 145 mmol/L    Potassium 3.4 (L) 3.5 - 5.3 mmol/L    Chloride 104 98 - 107 mmol/L    Bicarbonate 29 21 - 32 mmol/L    Anion Gap 10 10 - 20 mmol/L    Urea Nitrogen 11 6 - 23 mg/dL    Creatinine 0.70 0.50 - 1.30 mg/dL    eGFR >90 >60 mL/min/1.73m*2    Calcium 9.3 8.6 - 10.6 mg/dL    Phosphorus 3.5 2.5 - 4.9 mg/dL    Albumin 3.7 3.4 - 5.0 g/dL   Magnesium   Result Value Ref Range    Magnesium 2.07 1.60 - 2.40 mg/dL     FL GI esophagram   Final Result   Exam limitation due to limited pre and post  images because the   x-ray machine in the fluoroscopy suite was not working.        1. Contrast filling of the posterior upper 3rd of the esophagus at   the patient's previously known site of Zenker's diverticulum   suggestive of a residual diverticulum. No evidence of extraluminal   contrast extravasation to suggest a postoperative leak.   2. Tracheal aspiration of the oral contrast. Recommend swallow   evaluation with speech pathology.        I personally reviewed the images/study and I agree with the findings   as stated by Maite Esparza DO, PGY-3. This study was interpreted   at [facility], [place], [place].        MACRO:   Maite Esparza discussed the significance and urgency of this   critical finding by Epic Chat with  SANDRA TEJADA on 5/25/2025 at 1:29   pm.  (**-RCF-**) Findings:  See findings.             Signed by: Westley Jay 5/25/2025 3:38 PM   Dictation workstation:   [id number]      XR chest 1 view   Final Result   1. Slightly increased interstitial pulmonary edema in comparison to   previous study   2. Increased subsegmental atelectasis right lower lobe and to a   lesser degree left lower lobe. Small left pleural effusion may be   present.   3. Increased subcutaneous emphysema within the neck  right greater   than left   4. Pneumomediastinum                  I personally reviewed the images/study and I agree with the findings   as stated by Dr. Mayito Johnson. This study was interpreted at [place], [place].        MACRO:   Mayito Johnson discussed the significance and urgency of this critical   finding by epic secure chat with  SANDRA TEJADA on 5/23/2025 at 5:06 am.   (**-RCF-**) Findings:  See findings.        Signed by: Rosendo Rosas 5/23/2025 1:21 PM   Dictation workstation:   [id number]      XR abdomen 1 view   Final Result   Chest:   No evidence of pneumomediastinum. No acute cardiopulmonary process.   Subcutaneous air at the neck likely related to recent surgery        Abdomen:   Interval placement of Dobbhoff tube which terminates in the gastric   body        I personally reviewed the images/study and I agree with the findings   as stated by Modesto Villarreal MD, PGY-2 this study was interpreted at   [place], [place].        MACRO:   None        Signed by: Rosendo Rosas 5/22/2025 11:56 AM   Dictation workstation:   [id number]      XR chest 1 view   Final Result   Chest:   No evidence of pneumomediastinum. No acute cardiopulmonary process.   Subcutaneous air at the neck likely related to recent surgery        Abdomen:   Interval placement of Dobbhoff tube which terminates in the gastric   body        I personally reviewed the images/study and I agree with the findings   as stated by Modesto Villarreal MD, PGY-2 this study was interpreted at   [place], [place].        MACRO:   None        Signed by: Rosendo Rosas 5/22/2025 11:56 AM   Dictation workstation:   [id number]      FL GI esophagram    (Results Pending)                          Assessment & Plan  Zenker's diverticulum    Rosendo Delacruz is a 59 y.o. male with PMHx afib (s/p ablation 2018) and dysphagia 2/2 zenker diverticulum s/p  endoscopic laser and repair with Dr. White on 5/22, with course complicated by c/f esophageal leak, possible shingles, and post-op hypertension. Medicine consulted for post-op hypertension.     #Hypertension   :: Per outpatient record/Patient verbal report, his BP has been ranging in 140s/90s most recently  :: TTE (5/19) - normal biventricular function, mild LVH, no valvulopathies   Plan:  - Recommend increasing to lisinopril 20 mg daily (can give 1 time dose of 10 mg today, since he already received 10 mg earlier)  - Continue hydralazine 10 mg PO q8h prn for SBP>180  - Ensure adequate pain control - defer pain regimen to primary team        Medicine will continue to follow. Please reach out with questions.      Plan discussed and patient seen by attending physician Dr. Garsia.           Jonathan Holly,

## 2025-05-27 NOTE — CARE PLAN
Problem: Pain - Adult  Goal: Verbalizes/displays adequate comfort level or baseline comfort level  Outcome: Progressing     Problem: Safety - Adult  Goal: Free from fall injury  Outcome: Progressing     Problem: Discharge Planning  Goal: Discharge to home or other facility with appropriate resources  Outcome: Progressing     Problem: Nutrition  Goal: Nutrient intake appropriate for maintaining nutritional needs  Outcome: Progressing     Problem: Fall/Injury  Goal: Not fall by end of shift  Outcome: Progressing  Goal: Be free from injury by end of the shift  Outcome: Progressing  Goal: Verbalize understanding of personal risk factors for fall in the hospital  Outcome: Progressing  Goal: Verbalize understanding of risk factor reduction measures to prevent injury from fall in the home  Outcome: Progressing  Goal: Pace activities to prevent fatigue by end of the shift  Outcome: Progressing     Problem: Pain  Goal: Takes deep breaths with improved pain control throughout the shift  Outcome: Progressing  Goal: Turns in bed with improved pain control throughout the shift  Outcome: Progressing  Goal: Walks with improved pain control throughout the shift  Outcome: Progressing  Goal: Performs ADL's with improved pain control throughout shift  Outcome: Progressing  Goal: Free from opioid side effects throughout the shift  Outcome: Progressing  Goal: Free from acute confusion related to pain meds throughout the shift  Outcome: Progressing   The patient's goals for the shift include  rest and comfort    The clinical goals for the shift include Pt will  have improve skin integrity    Over the shift, the patient did not make progress toward the following goals. Barriers to progression include safety. Recommendations to address these barriers include orient to room.

## 2025-05-28 ENCOUNTER — PHARMACY VISIT (OUTPATIENT)
Dept: PHARMACY | Facility: CLINIC | Age: 60
End: 2025-05-28
Payer: COMMERCIAL

## 2025-05-28 VITALS
HEART RATE: 57 BPM | SYSTOLIC BLOOD PRESSURE: 158 MMHG | DIASTOLIC BLOOD PRESSURE: 93 MMHG | OXYGEN SATURATION: 98 % | BODY MASS INDEX: 26.37 KG/M2 | TEMPERATURE: 97.2 F | HEIGHT: 72 IN | RESPIRATION RATE: 16 BRPM | WEIGHT: 194.67 LBS

## 2025-05-28 LAB
ALBUMIN SERPL BCP-MCNC: 3.6 G/DL (ref 3.4–5)
ANION GAP SERPL CALC-SCNC: 12 MMOL/L (ref 10–20)
BUN SERPL-MCNC: 10 MG/DL (ref 6–23)
CALCIUM SERPL-MCNC: 8.7 MG/DL (ref 8.6–10.6)
CHLORIDE SERPL-SCNC: 105 MMOL/L (ref 98–107)
CO2 SERPL-SCNC: 27 MMOL/L (ref 21–32)
CREAT SERPL-MCNC: 0.75 MG/DL (ref 0.5–1.3)
EGFRCR SERPLBLD CKD-EPI 2021: >90 ML/MIN/1.73M*2
ERYTHROCYTE [DISTWIDTH] IN BLOOD BY AUTOMATED COUNT: 12.5 % (ref 11.5–14.5)
GLUCOSE SERPL-MCNC: 84 MG/DL (ref 74–99)
HCT VFR BLD AUTO: 34.5 % (ref 41–52)
HGB BLD-MCNC: 11.9 G/DL (ref 13.5–17.5)
MAGNESIUM SERPL-MCNC: 2.16 MG/DL (ref 1.6–2.4)
MCH RBC QN AUTO: 31.2 PG (ref 26–34)
MCHC RBC AUTO-ENTMCNC: 34.5 G/DL (ref 32–36)
MCV RBC AUTO: 90 FL (ref 80–100)
NRBC BLD-RTO: 0 /100 WBCS (ref 0–0)
PHOSPHATE SERPL-MCNC: 3.4 MG/DL (ref 2.5–4.9)
PLATELET # BLD AUTO: 255 X10*3/UL (ref 150–450)
POTASSIUM SERPL-SCNC: 3.6 MMOL/L (ref 3.5–5.3)
RBC # BLD AUTO: 3.82 X10*6/UL (ref 4.5–5.9)
SODIUM SERPL-SCNC: 140 MMOL/L (ref 136–145)
WBC # BLD AUTO: 6 X10*3/UL (ref 4.4–11.3)

## 2025-05-28 PROCEDURE — 80069 RENAL FUNCTION PANEL: CPT | Performed by: STUDENT IN AN ORGANIZED HEALTH CARE EDUCATION/TRAINING PROGRAM

## 2025-05-28 PROCEDURE — 2500000001 HC RX 250 WO HCPCS SELF ADMINISTERED DRUGS (ALT 637 FOR MEDICARE OP): Performed by: STUDENT IN AN ORGANIZED HEALTH CARE EDUCATION/TRAINING PROGRAM

## 2025-05-28 PROCEDURE — 83735 ASSAY OF MAGNESIUM: CPT | Performed by: STUDENT IN AN ORGANIZED HEALTH CARE EDUCATION/TRAINING PROGRAM

## 2025-05-28 PROCEDURE — 36415 COLL VENOUS BLD VENIPUNCTURE: CPT | Performed by: STUDENT IN AN ORGANIZED HEALTH CARE EDUCATION/TRAINING PROGRAM

## 2025-05-28 PROCEDURE — 99024 POSTOP FOLLOW-UP VISIT: CPT | Performed by: OTOLARYNGOLOGY

## 2025-05-28 PROCEDURE — 2500000002 HC RX 250 W HCPCS SELF ADMINISTERED DRUGS (ALT 637 FOR MEDICARE OP, ALT 636 FOR OP/ED)

## 2025-05-28 PROCEDURE — RXMED WILLOW AMBULATORY MEDICATION CHARGE

## 2025-05-28 PROCEDURE — 85027 COMPLETE CBC AUTOMATED: CPT | Performed by: STUDENT IN AN ORGANIZED HEALTH CARE EDUCATION/TRAINING PROGRAM

## 2025-05-28 PROCEDURE — 2500000004 HC RX 250 GENERAL PHARMACY W/ HCPCS (ALT 636 FOR OP/ED): Mod: JZ

## 2025-05-28 PROCEDURE — 2500000001 HC RX 250 WO HCPCS SELF ADMINISTERED DRUGS (ALT 637 FOR MEDICARE OP)

## 2025-05-28 PROCEDURE — 99232 SBSQ HOSP IP/OBS MODERATE 35: CPT

## 2025-05-28 RX ORDER — MUPIROCIN 20 MG/G
OINTMENT TOPICAL 3 TIMES DAILY
Qty: 22 G | Refills: 0 | Status: SHIPPED | OUTPATIENT
Start: 2025-05-28 | End: 2025-06-06

## 2025-05-28 RX ORDER — VALACYCLOVIR HYDROCHLORIDE 1 G/1
1000 TABLET, FILM COATED ORAL EVERY 8 HOURS SCHEDULED
Qty: 16 TABLET | Refills: 0 | Status: SHIPPED | OUTPATIENT
Start: 2025-05-28 | End: 2025-06-06 | Stop reason: WASHOUT

## 2025-05-28 RX ORDER — LISINOPRIL AND HYDROCHLOROTHIAZIDE 12.5; 2 MG/1; MG/1
1 TABLET ORAL DAILY
Qty: 30 TABLET | Refills: 0 | Status: SHIPPED | OUTPATIENT
Start: 2025-05-28 | End: 2025-06-06 | Stop reason: SDUPTHER

## 2025-05-28 RX ORDER — AMOXICILLIN AND CLAVULANATE POTASSIUM 875; 125 MG/1; MG/1
875 TABLET, FILM COATED ORAL 2 TIMES DAILY
Qty: 14 TABLET | Refills: 0 | Status: SHIPPED | OUTPATIENT
Start: 2025-05-28 | End: 2025-06-06

## 2025-05-28 RX ORDER — OXYCODONE HYDROCHLORIDE 5 MG/1
5 TABLET ORAL EVERY 6 HOURS PRN
Qty: 12 TABLET | Refills: 0 | Status: SHIPPED | OUTPATIENT
Start: 2025-05-28 | End: 2025-06-06 | Stop reason: WASHOUT

## 2025-05-28 RX ADMIN — ACETAMINOPHEN 650 MG: 160 SOLUTION ORAL at 06:02

## 2025-05-28 RX ADMIN — AMPICILLIN SODIUM AND SULBACTAM SODIUM 3 G: 2; 1 INJECTION, POWDER, FOR SOLUTION INTRAMUSCULAR; INTRAVENOUS at 09:01

## 2025-05-28 RX ADMIN — LISINOPRIL 20 MG: 20 TABLET ORAL at 09:01

## 2025-05-28 RX ADMIN — ESOMEPRAZOLE MAGNESIUM 40 MG: 40 FOR SUSPENSION ORAL at 06:03

## 2025-05-28 RX ADMIN — DOCUSATE SODIUM 100 MG: 50 LIQUID ORAL at 09:01

## 2025-05-28 RX ADMIN — AMPICILLIN SODIUM AND SULBACTAM SODIUM 3 G: 2; 1 INJECTION, POWDER, FOR SOLUTION INTRAMUSCULAR; INTRAVENOUS at 14:28

## 2025-05-28 RX ADMIN — OXYCODONE HYDROCHLORIDE 10 MG: 5 SOLUTION ORAL at 01:20

## 2025-05-28 RX ADMIN — MUPIROCIN: 20 OINTMENT TOPICAL at 09:01

## 2025-05-28 RX ADMIN — ACETAMINOPHEN 650 MG: 160 SOLUTION ORAL at 14:28

## 2025-05-28 RX ADMIN — VALACYCLOVIR 1000 MG: 500 TABLET, FILM COATED ORAL at 14:28

## 2025-05-28 RX ADMIN — IBUPROFEN 600 MG: 200 SUSPENSION ORAL at 06:03

## 2025-05-28 RX ADMIN — AMPICILLIN SODIUM AND SULBACTAM SODIUM 3 G: 2; 1 INJECTION, POWDER, FOR SOLUTION INTRAMUSCULAR; INTRAVENOUS at 03:51

## 2025-05-28 RX ADMIN — VALACYCLOVIR 1000 MG: 500 TABLET, FILM COATED ORAL at 06:03

## 2025-05-28 ASSESSMENT — COGNITIVE AND FUNCTIONAL STATUS - GENERAL
HELP NEEDED FOR BATHING: A LITTLE
MOBILITY SCORE: 21
PERSONAL GROOMING: A LITTLE
DAILY ACTIVITIY SCORE: 21
WALKING IN HOSPITAL ROOM: A LITTLE
DRESSING REGULAR UPPER BODY CLOTHING: A LITTLE
CLIMB 3 TO 5 STEPS WITH RAILING: A LITTLE
MOBILITY SCORE: 24
DAILY ACTIVITIY SCORE: 22
EATING MEALS: A LITTLE
EATING MEALS: A LITTLE
MOVING TO AND FROM BED TO CHAIR: A LITTLE

## 2025-05-28 ASSESSMENT — PAIN - FUNCTIONAL ASSESSMENT
PAIN_FUNCTIONAL_ASSESSMENT: 0-10

## 2025-05-28 ASSESSMENT — PAIN SCALES - GENERAL
PAINLEVEL_OUTOF10: 3
PAINLEVEL_OUTOF10: 7
PAINLEVEL_OUTOF10: 3
PAINLEVEL_OUTOF10: 3

## 2025-05-28 ASSESSMENT — PAIN DESCRIPTION - DESCRIPTORS: DESCRIPTORS: ACHING

## 2025-05-28 NOTE — PROGRESS NOTES
ENT DAILY PROGRESS NOTE  Name: Rosendo Carmona  MRN: [id number]  : 1965     Identification Statement  The patient is a 59 y.o. male who underwent MicroDirect laryngoscopy, esophagoscopy, endoscopic Zenker's diverticulectomy, CO2 laser and Dobbhoff tube placement on 25 with Dr. White.     Subjective  Overall improved. No fevers. Hypertensive     Objective  Temp:  [36.1 °C (97 °F)-37.6 °C (99.7 °F)] 36.9 °C (98.4 °F)  Heart Rate:  [69-83] 73  Resp:  [16-20] 16  BP: (115-169)/(66-93) 141/82  Gen: Alert, oriented, no acute distress  Resp: Breathing comfortably on room air, no stridor  Head: Atraumatic, normocephalic  Oral Cavity: MMM  Ears: Normal external ears  Nose: External nose midline   Neck: no crepitus    Intake/Output Summary (Last 24 hours) at 2025 1629  Last data filed at 2025 1500      Gross per 24 hour   Intake 3678.33 ml   Output 1125 ml   Net 2553.33 ml         Labs     Assessment  The patient is a 59 y.o. male who underwent microDirect laryngoscopy, esophagoscopy, endoscopic Zenker's diverticulectomy, CO2 laser and Dobbhoff tube placement on 25 with Dr. White. Patient with post op course complicated by pneumomediastinum and pain. Some concern for possible contained leak on esophagram from .     Active Problems:  - Zenker's diverticulum status post surgical repair     Plan:  - new onset vesicular rash, VZV negative - derm recs continued valacyclovir for 7 days  - Analgesia: Scheduled acetaminophen q8h,  Oxycodone 5mg & 10 mg q6h prn, Dilaudid 0.2 q3h prn  - ID: Unasyn 3g q6h  - Resp: wean O2 as able  - CV: monitor vitals; hypertensive, added lisinopril, medicine consulted  - GI: Bowel regimen, PPI and PRN Anti-emetic   - FEN: fulls, will progress to purees if tolerating breakfast, will remove DHT if tolerating   - : Voiding spontaneously   - Heme: Trend CBC and RFP  - Endo: SSI   - Embolic PPx: SQH, SCDs while in bed  - Dispo: continued care on SCC5;      Anastasia Amaya MD

## 2025-05-28 NOTE — PROGRESS NOTES
Rosendo Delacruz is a 59 y.o. male on day 5 of admission presenting with Zenker's diverticulum.    Subjective   He states that most of his pain is located in his neck near the procedure and has been overalls. He denies HA, chest pain, SOB, n/v, abdominal pain, LE edema.         Objective     Physical Exam  Constitutional:       General: He is not in acute distress.  HENT:      Head: Normocephalic and atraumatic.      Mouth/Throat:      Mouth: Mucous membranes are moist.      Pharynx: Oropharynx is clear.   Cardiovascular:      Rate and Rhythm: Normal rate and regular rhythm.      Pulses: Normal pulses.      Heart sounds: Normal heart sounds. No murmur heard.     No gallop.   Pulmonary:      Effort: Pulmonary effort is normal. No respiratory distress.      Breath sounds: Normal breath sounds. No wheezing or rales.   Abdominal:      General: Abdomen is flat.      Palpations: Abdomen is soft.      Tenderness: There is no abdominal tenderness.   Musculoskeletal:      Right lower leg: No edema.      Left lower leg: No edema.   Skin:     General: Skin is warm and dry.   Neurological:      Mental Status: He is alert.         Last Recorded Vitals  Blood pressure (!) 158/93, pulse 57, temperature 36.2 °C (97.2 °F), temperature source Temporal, resp. rate 16, height 1.829 m (6'), weight 88.3 kg (194 lb 10.7 oz), SpO2 98%.  Intake/Output last 3 Shifts:  I/O last 3 completed shifts:  In: 2155 (24.4 mL/kg) [P.O.:240; NG/GT:1315; IV Piggyback:600]  Out: 0 (0 mL/kg)   Weight: 88.3 kg     Relevant Results  Results for orders placed or performed during the hospital encounter of 05/22/25 (from the past 24 hours)   CBC   Result Value Ref Range    WBC 6.0 4.4 - 11.3 x10*3/uL    nRBC 0.0 0.0 - 0.0 /100 WBCs    RBC 3.82 (L) 4.50 - 5.90 x10*6/uL    Hemoglobin 11.9 (L) 13.5 - 17.5 g/dL    Hematocrit 34.5 (L) 41.0 - 52.0 %    MCV 90 80 - 100 fL    MCH 31.2 26.0 - 34.0 pg    MCHC 34.5 32.0 - 36.0 g/dL    RDW 12.5 11.5 - 14.5 %    Platelets  255 150 - 450 x10*3/uL   Renal Function Panel   Result Value Ref Range    Glucose 84 74 - 99 mg/dL    Sodium 140 136 - 145 mmol/L    Potassium 3.6 3.5 - 5.3 mmol/L    Chloride 105 98 - 107 mmol/L    Bicarbonate 27 21 - 32 mmol/L    Anion Gap 12 10 - 20 mmol/L    Urea Nitrogen 10 6 - 23 mg/dL    Creatinine 0.75 0.50 - 1.30 mg/dL    eGFR >90 >60 mL/min/1.73m*2    Calcium 8.7 8.6 - 10.6 mg/dL    Phosphorus 3.4 2.5 - 4.9 mg/dL    Albumin 3.6 3.4 - 5.0 g/dL   Magnesium   Result Value Ref Range    Magnesium 2.16 1.60 - 2.40 mg/dL     FL GI esophagram   Preliminary Result   1. Previously demonstrated contrast filling into the posterior upper   esophagus over patient's previous known Zenker's diverticulum   suggestive of residual diverticulum is not demonstrated on today's   examination. There is no evidence of extraluminal contrast to suggest   postoperative leak of the upper esophageal surgical site or a   residual diverticulum.   2. Persistent but decreased aspiration into the proximal trachea when   compared to esophagram 05/25/2025. Recommend follow-up swallow   evaluation with speech pathology prior to initiation of oral intake.        I personally reviewed the images/study and I agree with the findings   as stated by Resident Dr. Merissa Sargent MD. This study was   interpreted at [place], [place].        MACRO:   None             Dictation workstation:   [id number]      FL GI esophagram   Final Result   Exam limitation due to limited pre and post  images because the   x-ray machine in the fluoroscopy suite was not working.        1. Contrast filling of the posterior upper 3rd of the esophagus at   the patient's previously known site of Zenker's diverticulum   suggestive of a residual diverticulum. No evidence of extraluminal   contrast extravasation to suggest a postoperative leak.   2. Tracheal aspiration of the oral contrast. Recommend swallow   evaluation with  speech pathology.        I personally reviewed the images/study and I agree with the findings   as stated by Maite Esparza DO, PGY-3. This study was interpreted   at [place], [place].        MACRO:   Maite Esparza discussed the significance and urgency of this   critical finding by Epic Chat with  SANDRA TEJADA on 5/25/2025 at 1:29   pm.  (**-RCF-**) Findings:  See findings.             Signed by: Westley Jay 5/25/2025 3:38 PM   Dictation workstation:   [id number]      XR chest 1 view   Final Result   1. Slightly increased interstitial pulmonary edema in comparison to   previous study   2. Increased subsegmental atelectasis right lower lobe and to a   lesser degree left lower lobe. Small left pleural effusion may be   present.   3. Increased subcutaneous emphysema within the neck right greater   than left   4. Pneumomediastinum                  I personally reviewed the images/study and I agree with the findings   as stated by Dr. Mayito Johnson. This study was interpreted at [place], [place].        MACRO:   Mayito Johnson discussed the significance and urgency of this critical   finding by epic secure chat with  SANDRA TEJADA on 5/23/2025 at 5:06 am.   (**-RCF-**) Findings:  See findings.        Signed by: Rosendo Rosas 5/23/2025 1:21 PM   Dictation workstation:   [id number]      XR abdomen 1 view   Final Result   Chest:   No evidence of pneumomediastinum. No acute cardiopulmonary process.   Subcutaneous air at the neck likely related to recent surgery        Abdomen:   Interval placement of Dobbhoff tube which terminates in the gastric   body        I personally reviewed the images/study and I agree with the findings   as stated by Modesto Villarreal MD, PGY-2 this study was interpreted at   [place], [place].        MACRO:   None        Signed by: Rosendo Rosas 5/22/2025 11:56 AM    Dictation workstation:   [id number]      XR chest 1 view   Final Result   Chest:   No evidence of pneumomediastinum. No acute cardiopulmonary process.   Subcutaneous air at the neck likely related to recent surgery        Abdomen:   Interval placement of Dobbhoff tube which terminates in the gastric   body        I personally reviewed the images/study and I agree with the findings   as stated by Modesto Villarreal MD, PGY-2 this study was interpreted at   [facility], [place], [place].        MACRO:   None        Signed by: Rosendo Rosas 5/22/2025 11:56 AM   Dictation workstation:   [id number]                             Assessment & Plan  Zenker's diverticulum    Rosendo Delacruz is a 59 y.o. male with PMHx afib (s/p ablation 2018) and dysphagia 2/2 zenker diverticulum s/p endoscopic laser and repair with Dr. White on 5/22, with course complicated by c/f esophageal leak, possible shingles, and post-op hypertension. Medicine consulted for post-op hypertension.     #Hypertension   :: Per outpatient record/Patient verbal report, his BP has been ranging in 140s/90s most recently  :: TTE (5/19) - normal biventricular function, mild LVH, no valvulopathies   Plan:  - Recommend transitioning to lisinopril 20 mg-hydrochlorothiazide 12.5 mg daily starting tomorrow   - Continue hydralazine 10 mg PO q8h prn for SBP>180  - Ensure adequate pain control - defer pain regimen to primary team  - If planning for discharge, please discharge on lisinopril-hydrochlorothiazide dose as above and arrange PCP follow up in 1 week with RFP in 1 week to monitor electrolytes and creatinine         Medicine will continue to follow. Please reach out with questions.      Plan discussed and patient seen by attending physician Dr. Moore.           Jonathan Holly DO

## 2025-05-28 NOTE — CARE PLAN
Problem: Pain - Adult  Goal: Verbalizes/displays adequate comfort level or baseline comfort level  Outcome: Progressing     Problem: Safety - Adult  Goal: Free from fall injury  Outcome: Progressing     Problem: Discharge Planning  Goal: Discharge to home or other facility with appropriate resources  Outcome: Progressing     Problem: Chronic Conditions and Co-morbidities  Goal: Patient's chronic conditions and co-morbidity symptoms are monitored and maintained or improved  Outcome: Progressing     Problem: Nutrition  Goal: Nutrient intake appropriate for maintaining nutritional needs  Outcome: Progressing     Problem: Fall/Injury  Goal: Not fall by end of shift  Outcome: Progressing  Goal: Be free from injury by end of the shift  Outcome: Progressing  Goal: Verbalize understanding of personal risk factors for fall in the hospital  Outcome: Progressing  Goal: Verbalize understanding of risk factor reduction measures to prevent injury from fall in the home  Outcome: Progressing  Goal: Pace activities to prevent fatigue by end of the shift  Outcome: Progressing

## 2025-05-28 NOTE — PROGRESS NOTES
Spiritual Care Visit  Spiritual Care Request    Reason for Visit:  Routine Visit: Introduction     Request Received From:  Referral From: Family    Focus of Care:  Visited With: Patient and family together       Refer to :  Referral To:        Spiritual Care Annotation    Annotation:  While rounding on the unit with facility dog, patient's spouse requested visit.  introduced self and role to patient Rosedno Delacruz and his spouse. Due to patient being on contact precautions,  and facility dog visited from patient's door. They shared how patient's admission was and their expectation of discharge before long. They were appreciative of care and did not have any needs at this time. Spiritual Care remains available as needed/requested.    Rev. Ирина Cano MDiv, BCC

## 2025-05-28 NOTE — CARE PLAN
The patient's goals for the shift include      The clinical goals for the shift include pt will tolerate nutrional intake    Problem: Discharge Planning  Goal: Discharge to home or other facility with appropriate resources  Outcome: Progressing     Problem: Safety - Adult  Goal: Free from fall injury  Outcome: Progressing     Problem: Fall/Injury  Goal: Not fall by end of shift  Outcome: Progressing     Problem: Safety - Adult  Goal: Free from fall injury  Outcome: Progressing     Problem: Fall/Injury  Goal: Be free from injury by end of the shift  Outcome: Progressing

## 2025-05-29 ENCOUNTER — PATIENT OUTREACH (OUTPATIENT)
Dept: PRIMARY CARE | Facility: CLINIC | Age: 60
End: 2025-05-29
Payer: COMMERCIAL

## 2025-05-29 ENCOUNTER — TELEPHONE (OUTPATIENT)
Dept: PRIMARY CARE | Facility: CLINIC | Age: 60
End: 2025-05-29
Payer: COMMERCIAL

## 2025-05-29 NOTE — DISCHARGE SUMMARY
Discharge Diagnosis  Zenker's diverticulum    Issues Requiring Follow-Up  Zenkers diverticulum    Test Results Pending At Discharge  Pending Labs       Order Current Status    Surgical Pathology Exam In process            Hospital Course  Rosendo Delacruz is a 59 y.o. male with Zenker's diverticulum, who presented for microDirect laryngoscopy, esophagoscopy, endoscopic Zenker's diverticulectomy, CO2 laser and Dobbhoff tube placement on 5/22/25 with Dr. White . Patient had an uncomplicated surgical course. Patient recovered in PACU and was transferred to [address] for post-operative care.    The patient was noted to have worsened pain, difficulty swallowing, and crepitus. He remained on NG feeds and strict NPO. Antibiotics were started. Esophagram with concern for possible small contained leak on POD3. Continued with NPO diet for 2 additional days prior to repeating the esophagram which showed no concern for leakage. His diet was slowly advanced from CLD to purees. His feeding tube was removed once he was tolerating the diet. On day of discharge, post-operative pain was well controlled with enteral pain medication, breathing on room air, voiding spontaneously ambulating well, and was tolerating a diet. Follow-up arranged.     Pertinent Physical Exam At Time of Discharge  Physical Exam  See same day progress note    Home Medications     Medication List      START taking these medications     amoxicillin-clavulanate 875-125 mg tablet; Commonly known as: Augmentin;   Take 1 tablet (875 mg) by mouth 2 times a day for 7 days.   lisinopriL-hydrochlorothiazide 20-12.5 mg tablet; Take 1 tablet by mouth   once daily.   mupirocin 2 % ointment; Commonly known as: Bactroban; Apply topically 3   times a day for 16 doses.   oxyCODONE 5 mg immediate release tablet; Commonly known as: Roxicodone;   Take 1 tablet (5 mg) by mouth every 6 hours if needed for severe pain (7 -   10) for up to 3 days.   valACYclovir 1 gram tablet;  Commonly known as: Valtrex; Take 1 tablet   (1,000 mg) by mouth every 8 hours for 16 doses.       Outpatient Follow-Up  Future Appointments   Date Time Provider Department Center   7/2/2025  7:30 AM [facility]       Anastasia Amaya MD

## 2025-05-29 NOTE — PROGRESS NOTES
Discharge Facility: [facility]  Discharge Diagnosis: Zenker's diverticulum post op HTN  Admission Date:  5/22/25  Discharge Date: 5/28/25    PCP Appointment Date: tasked office for follow up  Specialist Appointment Date: D  Hospital Encounter and Summary Linked: Yes  Admission (Discharged) with Marcelle White MD (05/22/2025)     See discharge assessment below for further details     Wrap Up  Wrap Up Additional Comments: This CM spoke with patient via phone. Successful transition of care outreach complete. Pt reports doing well at home since discharge. Pt remains on a pureeddiet. New meds reviewed. Pt denies any prescription medication questions. Patient denies any further discharge questions/needs at this time. Emphasized that Follow up is needed after discharge to review the hospital recommendations, assess your response to your treatment. Tasked PCP office to schedule follow up for HTN.  Pt aware of my availability for non-emergent concerns. Contact info provided to patient. (5/29/2025  9:38 AM)    Medications  Medications reviewed with patient/caregiver?: Yes (5/29/2025  9:38 AM)  Is the patient having any side effects they believe may be caused by any medication additions or changes?: No (5/29/2025  9:38 AM)  Does the patient have all medications ordered at discharge?: Yes (5/29/2025  9:38 AM)  Care Management Interventions: No intervention needed (5/29/2025  9:38 AM)  Prescription Comments: amoxicillin-clavulanate (Augmentin)   lisinopriL-hydrochlorothiazide  mupirocin (Bactroban)   oxyCODONE (Roxicodone)   valACYclovir (Valtrex) (5/29/2025  9:38 AM)  Is the patient taking all medications as directed (includes completed medication regime)?: Yes (5/29/2025  9:38 AM)  Medication Comments: no noted issues obtaining medications (5/29/2025  9:38 AM)    Appointments  Does the patient have a primary care provider?: Yes (5/29/2025  9:38 AM)  Care Management Interventions: Educated patient on importance of making  appointment; Advised patient to make appointment (5/29/2025  9:38 AM)  Has the patient kept scheduled appointments due by today?: Yes (5/29/2025  9:38 AM)  Care Management Interventions: Advised patient to keep appointment (5/29/2025  9:38 AM)    Self Management  What is the home health agency?: denies need (5/29/2025  9:38 AM)  What Durable Medical Equipment (DME) was ordered?: denies need (5/29/2025  9:38 AM)    Patient Teaching  Does the patient have access to their discharge instructions?: Yes (5/29/2025  9:38 AM)  Care Management Interventions: Reviewed instructions with patient (5/29/2025  9:38 AM)  What is the patient's perception of their health status since discharge?: Improving (5/29/2025  9:38 AM)  Is the patient/caregiver able to teach back the hierarchy of who to call/visit for symptoms/problems? PCP, Specialist, Home Health nurse, Urgent Care, ED, 911: Yes (5/29/2025  9:38 AM)

## 2025-05-29 NOTE — TELEPHONE ENCOUNTER
----- Message from Bere Velarde sent at 5/29/2025  9:44 AM EDT -----  Regarding: TCM  Hello,      Can you please contact pt to schedule hosp follow up within 13 days of discharge.  This patient was discharged from: [facility]  Discharge diagnosis:   Zenker's diverticulum and HTN  Date of discharge: 5/28/25         Thank you

## 2025-06-03 NOTE — DOCUMENTATION CLARIFICATION NOTE
"    PATIENT:               CACHORRO ARTEGAA  ACCT #:                  [id number]  MRN:                       [id number]  :                       1965  ADMIT DATE:       2025 5:41 AM  DISCH DATE:        2025 5:15 PM  RESPONDING PROVIDER #:        [id number]          PROVIDER RESPONSE TEXT:    I agree with dietician diagnosis of moderate malnutrition on 25.    CDI QUERY TEXT:    Clarification    Instruction:    Based on your assessment of the patient and the clinical information, please provide the requested documentation by clicking on the appropriate radio button and enter any additional information if prompted.    Question: Please further clarify this patient nutritional status as    When answering this query, please exercise your independent professional judgment. The fact that a question is being asked, does not imply that any particular answer is desired or expected.    The patient's clinical indicators include:  Clinical Information:  58 y/o male admitted for endoscopic zenker's diverticulotomy with C02 laser, possible flexible esophagoscopy.    Clinical Indicators:  -BMI: 26  -RD consult noted \"Moderate malnutrition related to chronic disease or condition related to: decreased appetite as Evidenced by: pt self report of consuming <75% of estimated energy requirements for >1 month, and mild/moderate fat and muscle losses    Treatment:  -provide Isosource 1.5@ 20ml/hr and increase q8h until goal rate of 60ml/hrx 24 hours, Once pt tolerates continuous TF for 24 hours transition to bolus feeds    Risk Factors: Zenker's diverticulum  Options provided:  -- I agree with dietician diagnosis of moderate malnutrition on 25.  -- Other - I will add my own diagnosis  -- Refer to Clinical Documentation Reviewer    Query created by: Jeanette Aguillon on 2025 2:18 PM      Electronically signed by:  SHEREEN MICHAEL DO 6/3/2025 12:54 PM          "

## 2025-06-05 LAB
LABORATORY COMMENT REPORT: NORMAL
PATH REPORT.FINAL DX SPEC: NORMAL
PATH REPORT.GROSS SPEC: NORMAL
PATH REPORT.RELEVANT HX SPEC: NORMAL
PATH REPORT.TOTAL CANCER: NORMAL

## 2025-06-06 ENCOUNTER — APPOINTMENT (OUTPATIENT)
Dept: PRIMARY CARE | Facility: CLINIC | Age: 60
End: 2025-06-06
Payer: COMMERCIAL

## 2025-06-06 VITALS
BODY MASS INDEX: 24.87 KG/M2 | WEIGHT: 183.6 LBS | SYSTOLIC BLOOD PRESSURE: 114 MMHG | DIASTOLIC BLOOD PRESSURE: 74 MMHG | HEART RATE: 65 BPM | HEIGHT: 72 IN | OXYGEN SATURATION: 98 %

## 2025-06-06 DIAGNOSIS — K22.5 ZENKER'S DIVERTICULUM: Primary | ICD-10-CM

## 2025-06-06 DIAGNOSIS — R97.20 ELEVATED PSA: ICD-10-CM

## 2025-06-06 DIAGNOSIS — R13.10 DYSPHAGIA, UNSPECIFIED TYPE: ICD-10-CM

## 2025-06-06 DIAGNOSIS — I10 HTN (HYPERTENSION), BENIGN: ICD-10-CM

## 2025-06-06 DIAGNOSIS — R13.19 OTHER DYSPHAGIA: ICD-10-CM

## 2025-06-06 PROCEDURE — 3074F SYST BP LT 130 MM HG: CPT | Performed by: FAMILY MEDICINE

## 2025-06-06 PROCEDURE — 3078F DIAST BP <80 MM HG: CPT | Performed by: FAMILY MEDICINE

## 2025-06-06 PROCEDURE — 99495 TRANSJ CARE MGMT MOD F2F 14D: CPT | Performed by: FAMILY MEDICINE

## 2025-06-06 PROCEDURE — 3008F BODY MASS INDEX DOCD: CPT | Performed by: FAMILY MEDICINE

## 2025-06-06 RX ORDER — LISINOPRIL AND HYDROCHLOROTHIAZIDE 12.5; 2 MG/1; MG/1
1 TABLET ORAL DAILY
Qty: 90 TABLET | Refills: 1 | Status: SHIPPED | OUTPATIENT
Start: 2025-06-06 | End: 2025-12-03

## 2025-06-06 NOTE — PROGRESS NOTES
"Subjective   Patient ID: Rosendo Delacruz is a 59 y.o. male who presents for Hospital Follow-up.  [facility]    Hospital follow up. YANE complete.   Admitted to [facility] . Admission dates 5/22/2025 - 5/28/2025 (6 days) . Admitted for Zenker's diverticulum .   Days since discharge 9 days.   Patient had BW, XR chest 1 view, XR abdomen 1 view, FL GI esophagram,   Patient advised to follow up with PCP.   Patient was prescribed amoxicillin-clavulanate 875-125 mg tablet,  lisinopriL-hydrochlorothiazide 20-12.5 mg tablet, mupirocin 2 % ointment, oxyCODONE 5 mg immediate release tablet,  valACYclovir 1 gram tablet.   Patient is scheduled on 6/17/25 to have FL modified barium swallow study done.       States he is feeling much better. Is sleeping better and eating. He is just eating slower.     Would like to discuss his BP medication. He states in the hospital his BP was \"all over the place\"   Admits to a cough with the medication     Taking current medications which were reviewed.  Problem list discussed.    Overall doing well.  Eating okay.  Staying active.    Has no other new problem /question.     ROS  Constitutional- No activity change. No appetite change.  Eyes- Denies vision changes.  Respiratory- No shortness of breath.  Cardiovascular- No palpitations. No chest pain.  GI- No nausea or vomiting. No diarrhea or constipation. Denies abdominal pain.  Musculoskeletal- Denies joint swelling.  Extremities- No edema.  Neurological- Denies headaches. Denies dizziness.  Skin- No rashes.  Psychiatric/Behavioral- Denies significant anxiety, or depressed mood.     Objective     /84   Pulse 65   Ht 1.829 m (6')   Wt 83.3 kg (183 lb 9.6 oz)   SpO2 98%   BMI 24.90 kg/m²     Allergies[1]    Constitutional-- Well-nourished.  No distress  Head- unremarkable.  Ears- TMs clear.  Eyes- PERRL.  Conjunctiva normal.  Nose- Normal.  No rhinorrhea noted.  Throat- Oropharynx is clear and moist.  Neck- Supple with no " thyromegaly.  No significant cervical adenopathy noted.  Pulmonary/Chest- Breath sounds normal with normal effort.  No wheezing.  Heart- Regular rate and rhythm.  No murmur.  Abdomen- Soft and non-tender.  No masses noted.  Musculoskeletal- Normal ROM.  No significant joint swelling  Extremities- No edema.   Neurological- Alert.  No noted deficits.  Skin- Warm.  No rashes.  Psychiatric/Behavioral- Mood and affect normal.  Behavior normal.     Assessment/Plan   No diagnosis found.       Long talk. Treatment options reviewed.    Continue and take your medications as prescribed.    Health Maintenance issues discussed.    Importance of healthy diet and regular exercise regimen discussed.    We will contact you with any test results ordered. If you do not hear from us, please contact.    Follow-up as instructed or sooner if any problems or symptoms do not resolve as expected.    All medical record entries made by the Scribe were at my direction and personally dictated by me.    I have reviewed the chart and agree that the record accurately reflects my personal performance of the history, physical exam, discussion, and plan.       [1] No Known Allergies

## 2025-06-06 NOTE — PROGRESS NOTES
"Subjective   Patient ID: Rosendo Delacruz is a 59 y.o. male who presents for Hospital Follow-up.  [facility]    Hospital follow up. YANE complete.   Admitted to [facility] . Admission dates 5/22/2025 - 5/28/2025 (6 days) . Admitted for Zenker's diverticulum .   Days since discharge 9 days.   Patient had BW, XR chest 1 view, XR abdomen 1 view, FL GI esophagram,   Patient advised to follow up with PCP.   Patient was prescribed amoxicillin-clavulanate 875-125 mg tablet,  lisinopriL-hydrochlorothiazide 20-12.5 mg tablet, mupirocin 2 % ointment, oxyCODONE 5 mg immediate release tablet,  valACYclovir 1 gram tablet.   Patient is scheduled on 6/17/25 to have FL modified barium swallow study done.     States he is feeling much better. Is sleeping better and eating. He is just eating slower.      Would like to discuss his BP medication. He states in the hospital his BP was \"all over the place\"   Admits to a cough with the medication           Taking current medications which were reviewed.  Problem list discussed.    Overall doing well.  Eating okay.  Staying active.    Has no other new problem /question.     ROS  Constitutional- No activity change. No appetite change.  Eyes- Denies vision changes.  Respiratory- No shortness of breath.  Cardiovascular- No palpitations. No chest pain.  GI- No nausea or vomiting. No diarrhea or constipation. Denies abdominal pain.  Musculoskeletal- Denies joint swelling.  Extremities- No edema.  Neurological- Denies headaches. Denies dizziness.  Skin- No rashes.  Psychiatric/Behavioral- Denies significant anxiety, or depressed mood.     Objective     /74   Pulse 65   Ht 1.829 m (6')   Wt 83.3 kg (183 lb 9.6 oz)   SpO2 98%   BMI 24.90 kg/m²     Allergies[1]    Constitutional-- Well-nourished.  No distress  Head- unremarkable.  Eyes- PERRL.  Conjunctiva normal.  Nose- Normal.  No rhinorrhea noted.  Throat- Oropharynx is clear and moist.  Neck- Supple with no thyromegaly.  No " significant cervical adenopathy noted.  Pulmonary/Chest- Breath sounds normal with normal effort.  No wheezing.  Heart- Regular rate and rhythm.  No murmur.  Abdomen- Soft and non-tender.  No masses noted.  Musculoskeletal- Normal ROM.  No significant joint swelling  Extremities- No edema.   Neurological- Alert.  No noted deficits.  Skin- Warm.  No rashes.  Psychiatric/Behavioral- Mood and affect normal.  Behavior normal.     Assessment/Plan   1. Zenker's diverticulum  lisinopriL-hydrochlorothiazide 20-12.5 mg tablet      2. Dysphagia, unspecified type  lisinopriL-hydrochlorothiazide 20-12.5 mg tablet      3. Other dysphagia  lisinopriL-hydrochlorothiazide 20-12.5 mg tablet      4. HTN (hypertension), benign  CBC and Auto Differential    Basic Metabolic Panel    CBC and Auto Differential    Basic Metabolic Panel      5. Elevated PSA  Prostate Specific Antigen    Prostate Specific Antigen             Long talk. Treatment options reviewed.    Reviewed most recent lab work with patient. Advised patient to remain up to date on routine maintenance and health screening.  Maintain appointments with specialists as scheduled.  Advised patient to remain up to date on immunizations.     Hypertension controlled.     Complete blood work as discussed. Advised patient to remain properly hydrated.     Discussed importance of natural sources of nutrition.  Advised patient to consume vegetables, salads, fruits, nuts, and proteins such as fish and chicken.  Discussed portion control.      Discussed the importance of routine stretching and exercise.     Continue and take your medications as prescribed.    Health Maintenance issues discussed.    Importance of healthy diet and regular exercise regimen discussed.    We will contact you with any test results ordered. If you do not hear from us, please contact.    Follow-up as instructed or sooner if any problems or symptoms do not resolve as expected.    All medical record entries made by  the Scribe were at my direction and personally dictated by me.    I have reviewed the chart and agree that the record accurately reflects my personal performance of the history, physical exam, discussion, and plan.      Scribe Attestation  By signing my name below, I, Cierra Huerta   attest that this documentation has been prepared under the direction and in the presence of Tariq Dawson MD.         [1] No Known Allergies

## 2025-06-09 ENCOUNTER — TELEPHONE (OUTPATIENT)
Dept: PRIMARY CARE | Facility: CLINIC | Age: 60
End: 2025-06-09
Payer: COMMERCIAL

## 2025-06-09 DIAGNOSIS — L23.7 POISON IVY DERMATITIS: Primary | ICD-10-CM

## 2025-06-09 RX ORDER — METHYLPREDNISOLONE 4 MG/1
TABLET ORAL
Qty: 21 TABLET | Refills: 0 | Status: SHIPPED | OUTPATIENT
Start: 2025-06-09

## 2025-06-09 RX ORDER — TRIAMCINOLONE ACETONIDE 1 MG/G
CREAM TOPICAL 2 TIMES DAILY
Qty: 30 G | Refills: 0 | Status: SHIPPED | OUTPATIENT
Start: 2025-06-09

## 2025-06-09 NOTE — TELEPHONE ENCOUNTER
PT CALLED IN STATING HIS ARMS ARE ABOUT 90% COVERED WITH POISON BOBBY, STATES THAT JUST WHEN HE THINKS ITS DRIED UP AND ISNT CONCERNING IT SPREADS FURTHER. WOULD LIKE SOMETHING SENT INTO [facility] 6/6/25  PLEASE ADVISE

## 2025-06-09 NOTE — TELEPHONE ENCOUNTER
Tariq Dawson MD to [facility] [id number] [employer] (Selected Message)        6/9/25  2:30 PM  Please let her know I sent in a pill and a prescription cream to be used as directed.  Follow-up if not resolving

## 2025-06-10 ENCOUNTER — APPOINTMENT (OUTPATIENT)
Dept: OTOLARYNGOLOGY | Facility: CLINIC | Age: 60
End: 2025-06-10
Payer: COMMERCIAL

## 2025-06-11 ENCOUNTER — PATIENT OUTREACH (OUTPATIENT)
Dept: PRIMARY CARE | Facility: CLINIC | Age: 60
End: 2025-06-11
Payer: COMMERCIAL

## 2025-06-17 ENCOUNTER — HOSPITAL ENCOUNTER (OUTPATIENT)
Dept: RADIOLOGY | Facility: HOSPITAL | Age: 60
Discharge: HOME | End: 2025-06-17
Payer: COMMERCIAL

## 2025-06-17 DIAGNOSIS — R13.19 OTHER DYSPHAGIA: ICD-10-CM

## 2025-06-17 DIAGNOSIS — R13.10 DYSPHAGIA, UNSPECIFIED TYPE: ICD-10-CM

## 2025-06-17 DIAGNOSIS — K22.5 ZENKER'S DIVERTICULUM: ICD-10-CM

## 2025-06-17 PROCEDURE — 74230 X-RAY XM SWLNG FUNCJ C+: CPT

## 2025-06-17 PROCEDURE — 74230 X-RAY XM SWLNG FUNCJ C+: CPT | Performed by: INTERNAL MEDICINE

## 2025-06-17 PROCEDURE — 2500000005 HC RX 250 GENERAL PHARMACY W/O HCPCS: Performed by: STUDENT IN AN ORGANIZED HEALTH CARE EDUCATION/TRAINING PROGRAM

## 2025-06-17 PROCEDURE — 92611 MOTION FLUOROSCOPY/SWALLOW: CPT | Mod: GN | Performed by: STUDENT IN AN ORGANIZED HEALTH CARE EDUCATION/TRAINING PROGRAM

## 2025-06-17 RX ADMIN — BARIUM SULFATE 25 ML: 400 SUSPENSION ORAL at 08:59

## 2025-06-17 RX ADMIN — BARIUM SULFATE 95 ML: 0.81 POWDER, FOR SUSPENSION ORAL at 08:59

## 2025-06-17 RX ADMIN — BARIUM SULFATE 700 MG: 700 TABLET ORAL at 08:57

## 2025-06-17 RX ADMIN — BARIUM SULFATE 15 ML: 400 PASTE ORAL at 08:59

## 2025-06-17 NOTE — PROCEDURES
Outpatient Modified Barium Swallow Study     Patient Name: Rosendo Delacruz  MRN: [id number]  : 1965  Today's Date: 25  Time Calculation  Start Time: 842  Stop Time: 852  Time Calculation (min): 10 min       Modified Barium Swallow Study completed after informed verbal consent. The study was completed per protocol with various liquid and solid consistencies and a 13mm barium tablet. A 1.9 cm or .75 inch (outer diameter) ring was placed on the chin in the lateral view and on the lateral, left side of the neck in the a-p view in order to complete objective measurements during swallowing. The anatomic structures and function of the oropharynx, larynx, hypopharynx and cervical esophagus were evaluated.    Danelle Valadez MA, CCC-SLP  Phone/Pager: Epic/Haiku secure chat    SPEECH FINDINGS:   Reason for referral: Assess swallow function post Zenker's diverticulectomy  Patient hx: 59 y.o. male who underwent [employer] laryngoscopy, esophagoscopy, endoscopic Zenker's diverticulectomy, CO2 laser and Dobbhoff tube placement on 25 with Dr. White.   Respiratory status: room air  Current diet: regular/thin    DIET RECOMMENDATIONS:   Regular solids/Thin liquids  STRATEGIES:   -Sit upright 90 degrees for all PO  -Remain upright 20-30 minutes after meals  -Feed/eat at a slow rate  -Small bite/sip  -Alternate liquids/solids  -Medications whole with liquid    SLP PLAN:  Skilled SLP Services: No further skilled services for dysphagia is warranted.  Patient/Caregiver Agreeable: Yes    Pain Scale: 0/10, 0-no pain     Education provided: ST provided education to Patient regarding MBSS impressions, recommendations and POC at this time. Verbal understanding and agreement given on all accounts.     Treatment Provided today: No    Additional Medical Consults Suggested: - No new disciplines indicated    Mechanics of the swallow summary:  ORAL PHASE:  Lip Closure - Escape progressing to mid-chin   Tongue Control During  Bolus Hold - Posterior escape of less than half of the bolus   Bolus prep/mastication - Timely and efficient mastication skills   Bolus transport/lingual motion - Brisk tongue motion for A-P movement of the bolus   Oral residue - Residue collection on oral structure     PHARYNGEAL PHASE:  Initiation of pharyngeal swallow - Bolus head at pit of pyriforms   Soft palate elevation - No bolus between soft palate/pharyngeal wall   Laryngeal elevation - Complete superior movement of thyroid cartilage with contact of arytenoids to epiglottic petiole   Anterior hyoid excursion - No anterior movement   Epiglottic movement - Complete inversion    Laryngeal vestibule closure - Complete - no air/contrast in laryngeal vestibule   Pharyngeal stripping wave - Complete  Pharyngeal contraction (A/P view) - Complete  Pharyngoesophageal segment opening - Complete distension and complete duration/no obstruction of flow of bolus   Tongue base retraction - Trace column of contrast or air between tongue base and pharyngeal wall   Pharyngeal residue - Collection of residue within or on the pharyngeal structures     ESOPHAGEAL PHASE:  Esophageal clearance - Complete clearance     Of note, the A-P bolus follow-through is not intended to be utilized as a diagnostic assessment of the esophagus, rather a tool to observe the biomechanical aspects of the swallow continuum and to inform the need for further evaluation by medical specialists, as applicable.     SLP Impressions with severity rating:   Patient presents with oropharyngeal swallow grossly within functional limits upon completion of modified barium swallow study. Swallowing impairments, identified above, were mild and without significant impact on swallowing safety/efficiency. Patient demonstrated transient penetration of thin liquids, which can be a variant of normal swallowing. Patient did not demonstrate penetration of any other consistency, and no aspiration was visualized during  study. Patient demonstrated trace-mild oral and pharyngeal residue that was reduced with spontaneous repeat swallows.     Esophageal screen was competed in AP view, and no gross abnormalities were visualized.     Per the results of this assessment, patient is appropriate to continue baseline diet, with standard swallow precautions as noted above. No further skilled ST needs are identified at this time;  ST to remain available as needed upon re-consultation should new concerns arise.     OUTCOME MEASURES:  Eating Assessment Tool (EAT-10)   0=No problem, 1=Mild problem, 2=Mild to moderate problem, 3=Moderate problem, 4=Severe problem    EAT 10  My swallowing problem has caused me to lose weight.: 1  My swallowing problem interferes with my ability to go out for meals.: 1  Swallowing liquids takes extra effort.: 0  Swallowing solids takes extra effort.: 1  Swallowing pills takes extra effort.: 2  Swallowing is painful: 1  The pleasure of eating is affected by my swallowing.: 1  When I swallow food sticks in my throat.: 0  I cough when I eat.: 1  Swallowing is stressful: 0  EAT-10 TOTAL SCORE:: 8 (previous score of 28 on 4/1/25)    A total score of 3 or above may indicate difficulty with swallowing safely and/or efficiently    Functional Oral Intake Scale  Functional Oral Intake Scale: Level 7        total oral diet with no restrictions    Rosenbek's Penetration Aspiration Scale  Thin Liquids: 1. NO ASPIRATION & NO PENETRATION - no aspiration, contrast does not enter airway  Gallup Thick Liquids: 1. NO ASPIRATION & NO PENETRATION - no aspiration, contrast does not enter airway  Puree: 1. NO ASPIRATION & NO PENETRATION - no aspiration, contrast does not enter airway  Solids: 1. NO ASPIRATION & NO PENETRATION - no aspiration, contrast does not enter airway

## 2025-06-18 NOTE — PROGRESS NOTES
Subjective   Patient ID: Rosendo Delacruz is a 59 y.o. male who presents for Poison Ivy and Dizziness.//Rash  HPI    Patient presents in office for a poison ivy flare up. Ongoing x 2 weeks ago. Started on the rib cage area. Patient states that he has been using mupirocin 2% but the rash has not seemed to go away. Mild relief. Located on the inner arms and thighs, stomach, and groin area. Patient states that it has gotten better but has not gone away completely.     Patient also complains of dizziness. Ongoing x 1 week. Patient believes it may be his BP medication that he started 1 month ago. He underwent endoscopic Zenker's diverticulectomy on 5/22/25 by Dr. White and stayed in the hospital for a week post surgery. At that time, he had one episode of high BP at 148/92 and then was prescribed the medication. He has been on liquid diet. He is losing weight.      Review of systems  ; Patient seen today for exam denies any problems with headaches or vision, denies any shortness of breath chest pain nausea or vomiting, no black stool no blood in the stool no heartburn type symptoms denies any problems with constipation or diarrhea, and no dysuria-type symptoms    The patient's allergies medications were reviewed with them today    The patient's social family and surgical history or also reviewed here today, along with her past medical history.     Objective     Alert and active in  no acute distress  HEENT TMs clear oropharynx negative nares clear no drainage noted neck supple  With no adenopathy   Heart regular rate and rhythm without murmur and no carotid bruits  Lungs- clear to auscultation bilaterally, no wheeze or rhonchi noted  Thyroid -negative masses or nodularity  Abdomen- soft times four quadrants , bowel sounds positive no masses or organomegaly, negative tenderness guarding or rebound    skin -hive-like areas on his arms legs and back no signs of contact dermatitis      /78 (BP Location: Right arm,  Patient Position: Sitting, BP Cuff Size: Adult long)   Pulse 81   Temp 36.6 °C (97.8 °F) (Temporal)   Ht 1.829 m (6')   Wt 82.6 kg (182 lb)   SpO2 99%   BMI 24.68 kg/m²         Assessment/Plan   Problem List Items Addressed This Visit       HTN (hypertension)    Relevant Medications    losartan (Cozaar) 50 mg tablet    Other dysphagia    Relevant Medications    lisinopriL-hydrochlorothiazide 20-12.5 mg tablet    Zenker's diverticulum    Relevant Medications    lisinopriL-hydrochlorothiazide 20-12.5 mg tablet     Other Visit Diagnoses         Orthostatic hypotension    -  Primary      BMI 24.0-24.9, adult          Dysphagia, unspecified type        Relevant Medications    lisinopriL-hydrochlorothiazide 20-12.5 mg tablet      Ongoing use of possibly toxic medication          Hives                Given that the patient has been on a liquid diet and losing weight since his diverticulectomy one month ago, and is also on blood pressure medication, the dizziness could be related to dehydration and low blood pressure due to the medication. We advised him to cut Lisinopril-Hydrochlorothiazide to half the current dose and add low dose Losartan to the regimen and see if the dizziness improves.     Refilled Lisinopril-Hydrochlorothiazide and instructed to take 1/2 tablet once daily.  Prescribed Losartan 50 mg once daily.    We also emphasized the importance of increasing water and fluid intake to stay hydrated.    For the rash, he will stop using mupirocin 2%, as it is intended for staph infections and not for rashes. He likely has hives as a side effect of lisinopril-HCTZ, and the symptoms should subside over the next week now that the medication has been reduced to half. He will continue to monitor.    He was asked to follow up with Dr. Dawson over the next 2 weeks for BP medication.    If anything worsens or changes please call us at once, follow up in the office as planned,       Scribe Attestation  By signing my name  below, I, Cierra Gilliam   attest that this documentation has been prepared under the direction and in the presence of Eben Moore DO.    All medical record entries made by the Scribe were at my direction and personally dictated by me.   I have reviewed the chart and agree that the record accurately reflects my personal performance of the history, physical exam, discussion, and plan.

## 2025-06-20 ENCOUNTER — OFFICE VISIT (OUTPATIENT)
Dept: PRIMARY CARE | Facility: CLINIC | Age: 60
End: 2025-06-20
Payer: COMMERCIAL

## 2025-06-20 VITALS
HEIGHT: 72 IN | HEART RATE: 81 BPM | SYSTOLIC BLOOD PRESSURE: 110 MMHG | OXYGEN SATURATION: 99 % | WEIGHT: 182 LBS | BODY MASS INDEX: 24.65 KG/M2 | TEMPERATURE: 97.8 F | DIASTOLIC BLOOD PRESSURE: 78 MMHG

## 2025-06-20 DIAGNOSIS — L50.9 HIVES: ICD-10-CM

## 2025-06-20 DIAGNOSIS — Z79.899 ONGOING USE OF POSSIBLY TOXIC MEDICATION: ICD-10-CM

## 2025-06-20 DIAGNOSIS — R13.10 DYSPHAGIA, UNSPECIFIED TYPE: ICD-10-CM

## 2025-06-20 DIAGNOSIS — K22.5 ZENKER'S DIVERTICULUM: ICD-10-CM

## 2025-06-20 DIAGNOSIS — I95.1 ORTHOSTATIC HYPOTENSION: Primary | ICD-10-CM

## 2025-06-20 DIAGNOSIS — R13.19 OTHER DYSPHAGIA: ICD-10-CM

## 2025-06-20 DIAGNOSIS — I10 PRIMARY HYPERTENSION: ICD-10-CM

## 2025-06-20 PROCEDURE — 99214 OFFICE O/P EST MOD 30 MIN: CPT | Performed by: FAMILY MEDICINE

## 2025-06-20 PROCEDURE — 3008F BODY MASS INDEX DOCD: CPT | Performed by: FAMILY MEDICINE

## 2025-06-20 PROCEDURE — 3078F DIAST BP <80 MM HG: CPT | Performed by: FAMILY MEDICINE

## 2025-06-20 PROCEDURE — 3074F SYST BP LT 130 MM HG: CPT | Performed by: FAMILY MEDICINE

## 2025-06-20 PROCEDURE — 1036F TOBACCO NON-USER: CPT | Performed by: FAMILY MEDICINE

## 2025-06-20 RX ORDER — LOSARTAN POTASSIUM 50 MG/1
50 TABLET ORAL DAILY
Qty: 30 TABLET | Refills: 1 | Status: SHIPPED | OUTPATIENT
Start: 2025-06-20 | End: 2026-07-25

## 2025-06-20 RX ORDER — LISINOPRIL AND HYDROCHLOROTHIAZIDE 12.5; 2 MG/1; MG/1
0.5 TABLET ORAL DAILY
Start: 2025-06-20 | End: 2025-12-17

## 2025-06-20 ASSESSMENT — PATIENT HEALTH QUESTIONNAIRE - PHQ9
2. FEELING DOWN, DEPRESSED OR HOPELESS: NOT AT ALL
SUM OF ALL RESPONSES TO PHQ9 QUESTIONS 1 AND 2: 0
1. LITTLE INTEREST OR PLEASURE IN DOING THINGS: NOT AT ALL

## 2025-07-02 ENCOUNTER — APPOINTMENT (OUTPATIENT)
Dept: PRIMARY CARE | Facility: CLINIC | Age: 60
End: 2025-07-02
Payer: COMMERCIAL

## 2025-07-15 ENCOUNTER — OFFICE VISIT (OUTPATIENT)
Dept: OTOLARYNGOLOGY | Facility: CLINIC | Age: 60
End: 2025-07-15
Payer: COMMERCIAL

## 2025-07-15 VITALS
SYSTOLIC BLOOD PRESSURE: 171 MMHG | DIASTOLIC BLOOD PRESSURE: 97 MMHG | BODY MASS INDEX: 25.97 KG/M2 | WEIGHT: 191.47 LBS

## 2025-07-15 DIAGNOSIS — R49.0 DYSPHONIA: ICD-10-CM

## 2025-07-15 DIAGNOSIS — K22.5 ZENKER'S DIVERTICULUM: ICD-10-CM

## 2025-07-15 DIAGNOSIS — I10 PRIMARY HYPERTENSION: ICD-10-CM

## 2025-07-15 DIAGNOSIS — R13.19 OTHER DYSPHAGIA: ICD-10-CM

## 2025-07-15 DIAGNOSIS — I10 HYPERTENSION, UNSPECIFIED TYPE: ICD-10-CM

## 2025-07-15 DIAGNOSIS — R13.10 DYSPHAGIA, UNSPECIFIED TYPE: Primary | ICD-10-CM

## 2025-07-15 PROCEDURE — 31575 DIAGNOSTIC LARYNGOSCOPY: CPT | Performed by: OTOLARYNGOLOGY

## 2025-07-15 PROCEDURE — 99214 OFFICE O/P EST MOD 30 MIN: CPT | Mod: 25 | Performed by: OTOLARYNGOLOGY

## 2025-07-15 NOTE — PROGRESS NOTES
Patient: Rosendo Delacruz   MRN: [id number] YOB: 1965   Sex: male Age: 59 y.o.  Date of Service: 7/15/2025       ASSESSMENT AND PLAN  I discussed the findings with Rosendo Delacruz and have recommended the followin. Dysphagia in setting of Zenker's diverticulum. Prior EGDs without significant esophageal issues. s/p endoscopic Zenker's diverticulectomy 25 with improvement  - Follow-up 1 year with repeat MBS    2. 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 Hypertension in office today, he has not taking his BP medication  - Start losartan as prescribed      CHIEF COMPLAINT  Chief Complaint   Patient presents with    Follow-up       HISTORY OF PRESENT ILLNESS  Rosendo Delacruz is a 59 y.o. male referred by Dr. Mcpherson ref. provider found for evaluation of dysphagia and Zenker's diverticulum.  The patient has a history of cardiomyopathy nonischemic, sciatica, HTN, marijuana abuse (twice a week), A-fib with ablation 7-8 years ago (no blood thinners or issues now), osteoarthritis, elevated bilirubin.      7/15/25  He is s/p endoscopic Zenker's diverticulectomy 25. He reports he is swallowing better and sleeping better because he is coughing less. He still occasionally feels something when he is swallowing. No more regurgitation. He is advancing his diet. He was hypertensive in the hospital and started on blood pressure medication; his PCP has made some recent adjustments due to lightheadedness but he has not started that yet.    25  The dysphagia history includes:      Dysphagia for solids               yes    Dysphagia for liquids              yes    Dysphagia for pills                  yes  Associated weight loss            yes, 10-15 lbs because he is not eating as much   Recent pneumonia/bronchitis  no  GERD/Acid reflux   No, no on medication    He reports it feels like something is stuck in his throat constantly and he  has to gulp to push fluids and food down. After pt eats he does bring up undigested food. He feels his voice is becoming more hoarse as well. Symptoms began 12 months ago but 6 months ago symptoms have greatly worsened. Due to work schedules, he eats within an hour of going to bed and gets a lot of regurgitation. Some trouble breathing at night. Denies NV, hematemesis.     SH: occasional MJ, no history of cigarettes, he works as a   Meds: no blood thinners    ADDITIONAL HISTORY  Past Medical History  He has a past medical history of Arrhythmia, Arthritis, Cardiomyopathy, nonischemic (Multi), Dysphagia, Irregular heart beat, Personal history of other diseases of the musculoskeletal system and connective tissue (07/29/2020), Personal history of other infectious and parasitic diseases (07/29/2020), Strain of unspecified muscle(s) and tendon(s) at lower leg level, right leg, initial encounter (09/02/2020), Tinnitus, and Vision loss. Surgical History  He has a past surgical history that includes Other surgical history (07/29/2020); Hernia repair; Tonsillectomy; Colonoscopy; and Esophagogastroduodenoscopy.   Social History  He reports that he has quit smoking. His smoking use included cigars. He has never used smokeless tobacco. He reports current alcohol use. He reports current drug use. Drug: Marijuana. Allergies  Lisinopril-hydrochlorothiazide     Family History  Family History   Problem Relation Name Age of Onset    Hypertension Mother      Heart attack Father      Cushing syndrome Brother      Cancer Brother Ike         REVIEW OF SYSTEMS  All 10 systems were reviewed and negative except for above.      PHYSICAL EXAM  ENT Physical Exam   GENERAL: Well-nourished and developed, alert and appropriate, no distress, voice [id number]  RESPIRATORY: Breathing quietly, no stridor  HEAD: Normocephalic atraumatic  FACE: Symmetric, no masses or lesions  EYES:  Pupils reactive, sclera clear, external ocular muscles  intact, no nystagmus.    EARS:  Pinnae normal. External auditory canals clear and tympanic membranes intact.  NOSE:  No anterior lesions, masses or polyps.  ORAL CAVITY/OROPHARYNX:  Buccal mucosa is moist without lesions or masses, tongue midline and palate elevates symmetrically. Tongue mobility intact. No belen, 3 fingers thyromental distance  NECK:  Soft. There is no lymphadenopathy or thyromegaly.  Full ROM  NEUROLOGIC:  Cranial nerves II-XII grossly intact.       Last Recorded Vitals  Blood pressure (!) 171/97, weight 86.8 kg (191 lb 7.5 oz).    RESULTS    Patient Reported Outcome Measures  EAT-10  7/15/25 - 4/40 4/1/25 - 28/40    Laboratory, Radiology, and Pathology  I personally reviewed the following results, with the following interpretation:   MBS 6/17/25 - improved appearance      MBS 4/1/25 - hypopharyngeal diverticulum most consistent with a Zenker's      EGD 2/5/25  Zenker's diverticulosis containing food and causing moderate luminal narrowing in the cricopharynx  The upper third of the esophagus, middle third of the esophagus, lower third of the esophagus and GE junction appeared normal.  The cardia, fundus of the stomach, body of the stomach and antrum appeared normal.  The duodenal bulb and 2nd part of the duodenum appeared normal.    Path 1/28/25  B. ESOPHAGUS DISTAL, BIOPSY:               -Squamous mucosa with no significant histopathologic findings.              -No microscopic evidence of eosinophilic esophagitis.     C. ESOPHAGUS PROXIMAL, BIOPSY:               -Squamous mucosa with no significant histopathologic findings.              -No microscopic evidence of eosinophilic esophagitis.    PROCEDURES  Flexible Fiberoptic Laryngoscopy     Patient failed a mirror exam due to limitations of equipment and the need for laryngoscopy to assess laryngeal anatomy and function    PREOPERATIVE DIAGNOSIS: dysphagia, dysphonia    POSTOPERATIVE DIAGNOSIS: Same    PROCEDURE: Transnasal  videolaryngoscopy    ANESTHESIA:  Topical    COMPLICATIONS:  None    SPECIMENS:  None    PROCEDURE IN DETAIL:  The patient was brought into the endoscopy suite, placed in the upright position.  The nasal cavity was topically decongested anesthetized.  The distal chip video laryngoscope was passed through the nasal cavity.  The nasal cavity and nasopharynx were within normal limits except noted below. The following findings on laryngoscopy were noted:     Tongue Base: no masses or lesions   Vocal Fold Mobility               Right VF: mobile               Left VF: mobile   TVF Appearance               Edema/Erythema: none               Lesions/vibratory margin irregularities: mild-mod atrophy   Muscle Tension Patterns:  lateral   Other Findings: no pooled secretions    The patient tolerated the procedure well.        ----------------------------------------------------------------------  Marcelle White MD, MAEd    [facility]  Department of Otolaryngology - Head and Neck Surgery  [facility]    The total time I spent in care of this patient today (excluding time spent on other billable services) is as follows:    Time Spent  Prep time on day of patient encounter: 5 minutes  Time spent directly with patient, family or caregiver: 15 minutes  Additional Time Spent on Patient Care Activities: 5 minutes  Documentation Time: 5 minutes  Other Time Spent: 0 minutes  Total: 30 minutes

## 2025-07-15 NOTE — PROGRESS NOTES
EAT-10 Survey      Patient Name: Rosendo Delacruz  MRN: [id number]  Today's Date: 7/15/2025             Current Problem:   1. Other dysphagia  FL modified barium swallow study      2. Zenker's diverticulum  FL modified barium swallow study          SLP Outcome Measures:  EAT 10  My swallowing problem has caused me to lose weight.: 1  My swallowing problem interferes with my ability to go out for meals.: 1  Swallowing liquids takes extra effort.: 0  Swallowing solids takes extra effort.: 1  Swallowing pills takes extra effort.: 1  Swallowing is painful: 0  The pleasure of eating is affected by my swallowing.: 0  When I swallow food sticks in my throat.: 0  I cough when I eat.: 0  Swallowing is stressful: 0  EAT-10 TOTAL SCORE:: 4

## 2025-07-16 LAB
ANION GAP SERPL CALCULATED.4IONS-SCNC: 9 MMOL/L (CALC) (ref 7–17)
BASOPHILS # BLD AUTO: 42 CELLS/UL (ref 0–200)
BASOPHILS NFR BLD AUTO: 0.7 %
BUN SERPL-MCNC: 16 MG/DL (ref 7–25)
BUN/CREAT SERPL: NORMAL (CALC) (ref 6–22)
CALCIUM SERPL-MCNC: 9 MG/DL (ref 8.6–10.3)
CHLORIDE SERPL-SCNC: 108 MMOL/L (ref 98–110)
CO2 SERPL-SCNC: 24 MMOL/L (ref 20–32)
CREAT SERPL-MCNC: 0.81 MG/DL (ref 0.7–1.35)
EGFRCR SERPLBLD CKD-EPI 2021: 100 ML/MIN/1.73M2
EOSINOPHIL # BLD AUTO: 90 CELLS/UL (ref 15–500)
EOSINOPHIL NFR BLD AUTO: 1.5 %
ERYTHROCYTE [DISTWIDTH] IN BLOOD BY AUTOMATED COUNT: 14 % (ref 11–15)
GLUCOSE SERPL-MCNC: 80 MG/DL (ref 65–99)
HCT VFR BLD AUTO: 38.1 % (ref 38.5–50)
HGB BLD-MCNC: 12.5 G/DL (ref 13.2–17.1)
LYMPHOCYTES # BLD AUTO: 1428 CELLS/UL (ref 850–3900)
LYMPHOCYTES NFR BLD AUTO: 23.8 %
MCH RBC QN AUTO: 31.1 PG (ref 27–33)
MCHC RBC AUTO-ENTMCNC: 32.8 G/DL (ref 32–36)
MCV RBC AUTO: 94.8 FL (ref 80–100)
MONOCYTES # BLD AUTO: 498 CELLS/UL (ref 200–950)
MONOCYTES NFR BLD AUTO: 8.3 %
NEUTROPHILS # BLD AUTO: 3942 CELLS/UL (ref 1500–7800)
NEUTROPHILS NFR BLD AUTO: 65.7 %
PLATELET # BLD AUTO: 280 THOUSAND/UL (ref 140–400)
PMV BLD REES-ECKER: 10.4 FL (ref 7.5–12.5)
POTASSIUM SERPL-SCNC: 3.9 MMOL/L (ref 3.5–5.3)
PSA SERPL-MCNC: 4.32 NG/ML
RBC # BLD AUTO: 4.02 MILLION/UL (ref 4.2–5.8)
SODIUM SERPL-SCNC: 141 MMOL/L (ref 135–146)
WBC # BLD AUTO: 6 THOUSAND/UL (ref 3.8–10.8)

## 2025-07-16 RX ORDER — LOSARTAN POTASSIUM 50 MG/1
50 TABLET ORAL DAILY
Qty: 90 TABLET | Refills: 1 | Status: SHIPPED | OUTPATIENT
Start: 2025-07-16

## 2025-07-16 NOTE — TELEPHONE ENCOUNTER
Recent Visits  Date Type Provider Dept   06/20/25 Office Visit Eben Moore DO Do [id number]   06/06/25 Office Visit Tariq Dawson MD [id number]   01/15/25 Office Visit Tariq Dawson MD [id number]   Showing recent visits within past 365 days and meeting all other requirements  Future Appointments  No visits were found meeting these conditions.  Showing future appointments within next 90 days and meeting all other requirements

## 2025-12-05 ENCOUNTER — APPOINTMENT (OUTPATIENT)
Dept: PRIMARY CARE | Facility: CLINIC | Age: 60
End: 2025-12-05
Payer: COMMERCIAL

## (undated) DEVICE — MARKER, SKIN, RULER AND LABEL PACK, CUSTOM

## (undated) DEVICE — SYRINGE, 60 CC, IRRIGATION, BULB, CONTRO-BULB, PAPER POUCH

## (undated) DEVICE — CATHETER, URETHRAL, ROBNEL, 14 FR, 16 IN, LF, RED

## (undated) DEVICE — COVER, MAYO STAND, W/PAD, 23 IN, DISPOSABLE, PLASTIC, LF, STERILE

## (undated) DEVICE — SYRINGE, HYPODERMIC, TB, W/O NEEDLE, 1 CC, SLIP TIP

## (undated) DEVICE — REST, HEAD, BAGEL, 9 IN

## (undated) DEVICE — Device

## (undated) DEVICE — TUBE, FEEDING NASOGASTRIC / NASOINTESTINAL W/STYLET CORFLO; NON-ENFIT

## (undated) DEVICE — COUNTER, NEEDLE, FOAM BLOCK, W/MAGNET, W/BLADE GUARD, 10 COUNT, RED, LF

## (undated) DEVICE — TUBING, SUCTION, CONNECTING, STERILE 0.25 X 120 IN., LF

## (undated) DEVICE — SHIELD, EYE, FOX, CONVEX, ALUMINUM, NS

## (undated) DEVICE — DENTITION PROTECTOR, QUICKGUARD, NON-PERF

## (undated) DEVICE — APPLICATOR, DUAL LAPAROSCOPIC, VISTASEAL, 35CM, RIGID

## (undated) DEVICE — PAD, EYE, OVAL, 1 5/8 X 2 5/8 IN, STERILE

## (undated) DEVICE — ACUSPOT 712 MICROMANIPULATOR

## (undated) DEVICE — COVER, TABLE, 44 X 75 IN, DISPOSABLE, LF, STERILE

## (undated) DEVICE — MANIFOLD, 4 PORT NEPTUNE STANDARD

## (undated) DEVICE — NEEDLE, INJECTION, OROTRACHEAL

## (undated) DEVICE — RETAINING SYSTEM, NASAL TUBE, AMT BRIDLE PRO, 12-14FR

## (undated) DEVICE — CATHETER, RED RUBBER 14FR

## (undated) DEVICE — CUP, SOLUTION

## (undated) DEVICE — COVER, CART, 45 X 27 X 48 IN, CLEAR

## (undated) DEVICE — PITCHER, GRADUATE, 32 OZ (1200CC), STERILE